# Patient Record
Sex: MALE | Race: WHITE | NOT HISPANIC OR LATINO
[De-identification: names, ages, dates, MRNs, and addresses within clinical notes are randomized per-mention and may not be internally consistent; named-entity substitution may affect disease eponyms.]

---

## 2019-08-08 ENCOUNTER — APPOINTMENT (OUTPATIENT)
Dept: CARDIOLOGY | Facility: CLINIC | Age: 84
End: 2019-08-08
Payer: MEDICARE

## 2019-08-08 PROCEDURE — 93000 ELECTROCARDIOGRAM COMPLETE: CPT

## 2019-08-08 PROCEDURE — 99213 OFFICE O/P EST LOW 20 MIN: CPT

## 2019-09-04 ENCOUNTER — OUTPATIENT (OUTPATIENT)
Dept: OUTPATIENT SERVICES | Facility: HOSPITAL | Age: 84
LOS: 1 days | Discharge: HOME | End: 2019-09-04

## 2019-09-04 VITALS — SYSTOLIC BLOOD PRESSURE: 158 MMHG | HEART RATE: 61 BPM | DIASTOLIC BLOOD PRESSURE: 68 MMHG | RESPIRATION RATE: 15 BRPM

## 2019-09-04 VITALS
HEIGHT: 69 IN | SYSTOLIC BLOOD PRESSURE: 157 MMHG | OXYGEN SATURATION: 97 % | WEIGHT: 220.02 LBS | TEMPERATURE: 97 F | HEART RATE: 74 BPM | RESPIRATION RATE: 18 BRPM | DIASTOLIC BLOOD PRESSURE: 90 MMHG

## 2019-09-04 DIAGNOSIS — Z95.1 PRESENCE OF AORTOCORONARY BYPASS GRAFT: Chronic | ICD-10-CM

## 2019-09-04 NOTE — PRE-ANESTHESIA EVALUATION ADULT - NSANTHOSAYNRD_GEN_A_CORE
No. MOSHE screening performed.  STOP BANG Legend: 0-2 = LOW Risk; 3-4 = INTERMEDIATE Risk; 5-8 = HIGH Risk

## 2019-09-04 NOTE — ASU PREOP CHECKLIST - TO WHOM
opportunity for questions and answers rendered ANGEL Hull RN opportunity for questions and answers rendered

## 2019-09-04 NOTE — ASU PREOP CHECKLIST - BP NONINVASIVE SYSTOLIC (MM HG)
Take antibiotic/medication as directed  For body aches/fever take over the counter antipyretic  Stay hydrated, push fluid  Steam bath may help with congestion  Humidifier  Warm salt gargle for sore throat  Follow up with primary care physician in about 4-5 days, if symptoms worsen or having shortness of breath go to ED        Patient Education     Sinusitis (Antibiotic Treatment)    The sinuses are air-filled spaces within the bones of the face. They connect to the inside of the nose. Sinusitis is an inflammation of the tissue that lines the sinuses. Sinusitis can occur during a cold. It can also happen due to allergies to pollens and other particles in the air. Sinusitis can cause symptoms of sinus congestion and a feeling of fullness. A sinus infection causes fever, headache, and facial pain. There is often green or yellow fluid draining from the nose or into the back of the throat (post-nasal drip). You have been given antibiotics to treat this condition.  Home care  · Take the full course of antibiotics as instructed. Do not stop taking them, even when you feel better.  · Drink plenty of water, hot tea, and other liquids. This may help thin nasal mucus. It also may help your sinuses drain fluids.  · Heat may help soothe painful areas of your face. Use a towel soaked in hot water. Or,  the shower and direct the warm spray onto your face. Using a vaporizer along with a menthol rub at night may also help soothe symptoms.   · An expectorant with guaifenesin may help thin nasal mucus and help your sinuses drain fluids.  · You can use an over-the-counter decongestant, unless a similar medicine was prescribed to you. Nasal sprays work the fastest. Use one that contains phenylephrine or oxymetazoline. First blow your nose gently. Then use the spray. Do not use these medicines more often than directed on the label. If you do, your symptoms may get worse. You may also take pills that contain pseudoephedrine. Don’t  use products that combine multiple medicines. This is because side effects may be increased. Read labels. You can also ask the pharmacist for help. (People with high blood pressure should not use decongestants. They can raise blood pressure.)  · Over-the-counter antihistamines may help if allergies contributed to your sinusitis.    · Do not use nasal rinses or irrigation during an acute sinus infection, unless your healthcare provider tells you to. Rinsing may spread the infection to other areas in your sinuses.  · Use acetaminophen or ibuprofen to control pain, unless another pain medicine was prescribed to you. If you have chronic liver or kidney disease or ever had a stomach ulcer, talk with your healthcare provider before using these medicines. (Aspirin should never be taken by anyone under age 18 who is ill with a fever. It may cause severe liver damage.)  · Don't smoke. This can make symptoms worse.  Follow-up care  Follow up with your healthcare provider or our staff if you are better in 1 week.  When to seek medical advice  Call your healthcare provider if any of these occur:  · Facial pain or headache that gets worse  · Stiff neck  · Unusual drowsiness or confusion  · Swelling of your forehead or eyelids  · Vision problems, such as blurred or double vision  · Fever of 100.4ºF (38ºC) or higher, or as directed by your healthcare provider  · Seizure  · Breathing problems  · Symptoms don't go away in 10 days  Prevention  Here are steps you can take to help prevent an infection:  · Keep good hand washing habits.  · Don’t have close contact with people who have sore throats, colds, or other upper respiratory infections.  · Don’t smoke, and stay away from secondhand smoke.  · Stay up to date with of your vaccines.  Date Last Reviewed: 11/1/2017  © 9477-0833 [employer]. [address] [place] [address], [place] [address]. All rights reserved. This information is not intended as a substitute for professional  medical care. Always follow your healthcare professional's instructions.           Patient Education     Seasonal Allergy  Seasonal allergy is also called hay fever. It may occur after a person is exposed to pollens released from grasses, weeds, trees and shrubs. This type of allergy occurs during the spring and summer when the pollen contacts the lining of the nose, eyes, eyelids, sinuses and throat. This causes histamine to be released from the tissues. Histamine causes itching and swelling. This may produce a watery discharge from the eyes or nose. Violent sneezing, nasal congestion, post-nasal drip, itching of the eyes, nose, throat and mouth, scratchy throat, and dry cough may also occur.  Home care  Seasonal allergy cannot be cured, but symptoms can be reduced by these measures:  · Stay away from or limit your time near the allergen as much as you can:    ? Stay indoors on windy days of pollen season.   ? Keep windows and doors closed. Use air conditioning instead in your home and car. This filters the air.  ? Change air conditioner filters often.  ? Take a shower, wash your hair, and change clothes after being outdoors.  ? Put on a NIOSH-rated 95 filter mask when working outdoors. Before going outside, take your allergy medicine as advised by your healthcare provider.  · Decongestant pills and sprays reduce tissue swelling and watery discharge. Overuse of nasal decongestant sprays may make symptoms worse. Do not use these more often than recommended. Sometimes you can experience a rebound effect (symptoms worsen), when stopping them. Talk to your healthcare provider or pharmacist about these medicines before taking them, especially if you have high blood pressure or heart problems.   · Antihistamines block the release of histamine during the allergic response. They work better when taken before symptoms develop. Unless a prescription antihistamine was prescribed, you can take over-the-counter antihistamines  that do not cause drowsiness.  Ask your pharmacist for suggestions.  · Steroid nasal sprays or oral steroids may also be prescribed for more severe symptoms. These help to reduce the local inflammation that can add to the allergic response.  · If you have asthma, pollen season may make your asthma symptoms worse. It is important that you use your asthma medicines as directed during this time to prevent or treat attacks. Some persons with asthma have asthma symptoms that get worse when they take antihistamines. This is due to the drying effect on the lungs. If you notice this, stop the antihistamines, drink extra fluids and notify your doctor.  · If you have sinus congestion or drainage, a saline nasal rinse may give relief. A saline nasal rinse lessens the swelling and clears excess mucus. This allows sinuses to drain. Prepackaged kits are sold at most drug stores. These contain pre-mixed salt packets and an irrigation device.  Follow-up care  Follow up with your healthcare provider, or as advised. If you have been referred to a specialist, make an appointment promptly.  When to seek medical advice  Call your healthcare provider right away for any of the following:  · Facial, ear or sinus pain; colored drainage from the nose  · Headaches  · You have asthma and your asthma symptoms do not respond to the usual doses of your medicine  · Cough with colored sputum (mucus)  · Fever of 100.4°F (38°C) or higher, or as directed by the healthcare provider  Call 911  Call 911 if any of these occur:  · Trouble breathing or swallowing, wheezing  · Hoarse voice, trouble speaking, or drooling  · Confusion  · Very drowsy or trouble awakening  · Fainting or loss of consciousness  · Rapid heart rate, or weak pulse  · Low blood pressure  · Feeling of doom  · Nausea, vomiting, abdominal pain, diarrhea  · Vomiting blood, or large amounts of blood in stool  · Seizure  · Cold, moist, or pale (blue in color) skin  Date Last Reviewed:  157 5/1/2017  © 5345-9709 [employer]. [address] [place] [address], [place] [address]. All rights reserved. This information is not intended as a substitute for professional medical care. Always follow your healthcare professional's instructions.

## 2019-09-04 NOTE — CHART NOTE - NSCHARTNOTEFT_GEN_A_CORE
PACU ANESTHESIA ADMISSION NOTE      Procedure:   Post op diagnosis:      ____  Intubated  TV:______       Rate: ______      FiO2: ______    ___x_  Patent Airway    __x__  Full return of protective reflexes    ___x_  Full recovery from anesthesia / back to baseline status    Vitals:  T(C): 35.9 (09-04-19 @ 12:56), Max: 35.9 (09-04-19 @ 11:19)  HR: 74 (09-04-19 @ 12:56) (74 - 74)  BP: 157/90 (09-04-19 @ 12:56) (157/90 - 157/90)  RR: 18 (09-04-19 @ 12:56) (18 - 18)  SpO2: 97% (09-04-19 @ 12:56) (97% - 97%)    Mental Status:  _x___ Awake   ___x__ Alert   _____ Drowsy   _____ Sedated    Nausea/Vomiting:  ____ NO  ___x___Yes,   See Post - Op Orders          Pain Scale (0-10):  _____    Treatment: ____ None    __x__ See Post - Op/PCA Orders    Post - Operative Fluids:   ____ Oral   ____x See Post - Op Orders    Plan: Discharge:   x____Home       _____Floor     _____Critical Care    _____  Other:_________________    Comments: uneventful anesthesia course no complications. VItals stable. Pt transferred to PACU

## 2019-09-13 DIAGNOSIS — H21.562 PUPILLARY ABNORMALITY, LEFT EYE: ICD-10-CM

## 2019-09-13 DIAGNOSIS — I25.10 ATHEROSCLEROTIC HEART DISEASE OF NATIVE CORONARY ARTERY WITHOUT ANGINA PECTORIS: ICD-10-CM

## 2019-09-13 DIAGNOSIS — H25.12 AGE-RELATED NUCLEAR CATARACT, LEFT EYE: ICD-10-CM

## 2019-10-24 ENCOUNTER — APPOINTMENT (OUTPATIENT)
Dept: CARDIOLOGY | Facility: CLINIC | Age: 84
End: 2019-10-24

## 2020-01-04 ENCOUNTER — INPATIENT (INPATIENT)
Facility: HOSPITAL | Age: 85
LOS: 3 days | Discharge: HOME | End: 2020-01-08
Attending: INTERNAL MEDICINE | Admitting: HOSPITALIST
Payer: MEDICARE

## 2020-01-04 VITALS
RESPIRATION RATE: 20 BRPM | SYSTOLIC BLOOD PRESSURE: 132 MMHG | HEART RATE: 115 BPM | TEMPERATURE: 100 F | DIASTOLIC BLOOD PRESSURE: 79 MMHG | OXYGEN SATURATION: 97 %

## 2020-01-04 DIAGNOSIS — Z95.1 PRESENCE OF AORTOCORONARY BYPASS GRAFT: Chronic | ICD-10-CM

## 2020-01-04 PROCEDURE — 99285 EMERGENCY DEPT VISIT HI MDM: CPT

## 2020-01-05 DIAGNOSIS — Z98.49 CATARACT EXTRACTION STATUS, UNSPECIFIED EYE: Chronic | ICD-10-CM

## 2020-01-05 PROBLEM — I25.10 ATHEROSCLEROTIC HEART DISEASE OF NATIVE CORONARY ARTERY WITHOUT ANGINA PECTORIS: Chronic | Status: ACTIVE | Noted: 2019-09-04

## 2020-01-05 LAB
ALBUMIN SERPL ELPH-MCNC: 3.9 G/DL — SIGNIFICANT CHANGE UP (ref 3.5–5.2)
ALP SERPL-CCNC: 88 U/L — SIGNIFICANT CHANGE UP (ref 30–115)
ALT FLD-CCNC: 11 U/L — SIGNIFICANT CHANGE UP (ref 0–41)
ANION GAP SERPL CALC-SCNC: 15 MMOL/L — HIGH (ref 7–14)
APPEARANCE UR: ABNORMAL
AST SERPL-CCNC: 13 U/L — SIGNIFICANT CHANGE UP (ref 0–41)
BACTERIA # UR AUTO: ABNORMAL
BILIRUB SERPL-MCNC: 0.8 MG/DL — SIGNIFICANT CHANGE UP (ref 0.2–1.2)
BILIRUB UR-MCNC: NEGATIVE — SIGNIFICANT CHANGE UP
BUN SERPL-MCNC: 12 MG/DL — SIGNIFICANT CHANGE UP (ref 10–20)
CALCIUM SERPL-MCNC: 8.9 MG/DL — SIGNIFICANT CHANGE UP (ref 8.5–10.1)
CHLORIDE SERPL-SCNC: 99 MMOL/L — SIGNIFICANT CHANGE UP (ref 98–110)
CO2 SERPL-SCNC: 25 MMOL/L — SIGNIFICANT CHANGE UP (ref 17–32)
COLOR SPEC: YELLOW — SIGNIFICANT CHANGE UP
CREAT SERPL-MCNC: 1.1 MG/DL — SIGNIFICANT CHANGE UP (ref 0.7–1.5)
DIFF PNL FLD: ABNORMAL
EPI CELLS # UR: 0 /HPF — SIGNIFICANT CHANGE UP (ref 0–5)
GAS PNL BLDA: SIGNIFICANT CHANGE UP
GLUCOSE BLDC GLUCOMTR-MCNC: 173 MG/DL — HIGH (ref 70–99)
GLUCOSE BLDC GLUCOMTR-MCNC: 237 MG/DL — HIGH (ref 70–99)
GLUCOSE BLDC GLUCOMTR-MCNC: 311 MG/DL — HIGH (ref 70–99)
GLUCOSE SERPL-MCNC: 186 MG/DL — HIGH (ref 70–99)
GLUCOSE UR QL: NEGATIVE — SIGNIFICANT CHANGE UP
HCT VFR BLD CALC: 41.1 % — LOW (ref 42–52)
HGB BLD-MCNC: 13.4 G/DL — LOW (ref 14–18)
HYALINE CASTS # UR AUTO: 5 /LPF — SIGNIFICANT CHANGE UP (ref 0–7)
KETONES UR-MCNC: NEGATIVE — SIGNIFICANT CHANGE UP
LACTATE SERPL-SCNC: 1.1 MMOL/L — SIGNIFICANT CHANGE UP (ref 0.7–2)
LEUKOCYTE ESTERASE UR-ACNC: ABNORMAL
MCHC RBC-ENTMCNC: 30.1 PG — SIGNIFICANT CHANGE UP (ref 27–31)
MCHC RBC-ENTMCNC: 32.6 G/DL — SIGNIFICANT CHANGE UP (ref 32–37)
MCV RBC AUTO: 92.4 FL — SIGNIFICANT CHANGE UP (ref 80–94)
NITRITE UR-MCNC: POSITIVE
NRBC # BLD: 0 /100 WBCS — SIGNIFICANT CHANGE UP (ref 0–0)
PH UR: 7 — SIGNIFICANT CHANGE UP (ref 5–8)
PLATELET # BLD AUTO: 129 K/UL — LOW (ref 130–400)
POTASSIUM SERPL-MCNC: 4.2 MMOL/L — SIGNIFICANT CHANGE UP (ref 3.5–5)
POTASSIUM SERPL-SCNC: 4.2 MMOL/L — SIGNIFICANT CHANGE UP (ref 3.5–5)
PROT SERPL-MCNC: 6.5 G/DL — SIGNIFICANT CHANGE UP (ref 6–8)
PROT UR-MCNC: ABNORMAL
RBC # BLD: 4.45 M/UL — LOW (ref 4.7–6.1)
RBC # FLD: 13 % — SIGNIFICANT CHANGE UP (ref 11.5–14.5)
RBC CASTS # UR COMP ASSIST: 10 /HPF — HIGH (ref 0–4)
SODIUM SERPL-SCNC: 139 MMOL/L — SIGNIFICANT CHANGE UP (ref 135–146)
SP GR SPEC: 1.02 — SIGNIFICANT CHANGE UP (ref 1.01–1.02)
TROPONIN T SERPL-MCNC: <0.01 NG/ML — SIGNIFICANT CHANGE UP
UROBILINOGEN FLD QL: SIGNIFICANT CHANGE UP
WBC # BLD: 7 K/UL — SIGNIFICANT CHANGE UP (ref 4.8–10.8)
WBC # FLD AUTO: 7 K/UL — SIGNIFICANT CHANGE UP (ref 4.8–10.8)
WBC UR QL: 84 /HPF — HIGH (ref 0–5)

## 2020-01-05 PROCEDURE — 99283 EMERGENCY DEPT VISIT LOW MDM: CPT

## 2020-01-05 PROCEDURE — 70450 CT HEAD/BRAIN W/O DYE: CPT | Mod: 26

## 2020-01-05 PROCEDURE — 93010 ELECTROCARDIOGRAM REPORT: CPT

## 2020-01-05 PROCEDURE — 71045 X-RAY EXAM CHEST 1 VIEW: CPT | Mod: 26,77

## 2020-01-05 PROCEDURE — 71045 X-RAY EXAM CHEST 1 VIEW: CPT | Mod: 26

## 2020-01-05 RX ORDER — FUROSEMIDE 40 MG
4 TABLET ORAL
Qty: 0 | Refills: 0 | DISCHARGE

## 2020-01-05 RX ORDER — SITAGLIPTIN 50 MG/1
1 TABLET, FILM COATED ORAL
Qty: 0 | Refills: 0 | DISCHARGE

## 2020-01-05 RX ORDER — SODIUM CHLORIDE 9 MG/ML
1000 INJECTION INTRAMUSCULAR; INTRAVENOUS; SUBCUTANEOUS ONCE
Refills: 0 | Status: COMPLETED | OUTPATIENT
Start: 2020-01-05 | End: 2020-01-05

## 2020-01-05 RX ORDER — GABAPENTIN 400 MG/1
300 CAPSULE ORAL THREE TIMES A DAY
Refills: 0 | Status: DISCONTINUED | OUTPATIENT
Start: 2020-01-05 | End: 2020-01-08

## 2020-01-05 RX ORDER — ENOXAPARIN SODIUM 100 MG/ML
40 INJECTION SUBCUTANEOUS DAILY
Refills: 0 | Status: DISCONTINUED | OUTPATIENT
Start: 2020-01-05 | End: 2020-01-08

## 2020-01-05 RX ORDER — ATORVASTATIN CALCIUM 80 MG/1
20 TABLET, FILM COATED ORAL AT BEDTIME
Refills: 0 | Status: DISCONTINUED | OUTPATIENT
Start: 2020-01-05 | End: 2020-01-08

## 2020-01-05 RX ORDER — INSULIN LISPRO 100/ML
6 VIAL (ML) SUBCUTANEOUS ONCE
Refills: 0 | Status: COMPLETED | OUTPATIENT
Start: 2020-01-05 | End: 2020-01-05

## 2020-01-05 RX ORDER — VANCOMYCIN HCL 1 G
1500 VIAL (EA) INTRAVENOUS ONCE
Refills: 0 | Status: COMPLETED | OUTPATIENT
Start: 2020-01-05 | End: 2020-01-05

## 2020-01-05 RX ORDER — MEROPENEM 1 G/30ML
1000 INJECTION INTRAVENOUS EVERY 8 HOURS
Refills: 0 | Status: DISCONTINUED | OUTPATIENT
Start: 2020-01-05 | End: 2020-01-08

## 2020-01-05 RX ORDER — CHLORHEXIDINE GLUCONATE 213 G/1000ML
1 SOLUTION TOPICAL
Refills: 0 | Status: DISCONTINUED | OUTPATIENT
Start: 2020-01-05 | End: 2020-01-08

## 2020-01-05 RX ORDER — VANCOMYCIN HCL 1 G
VIAL (EA) INTRAVENOUS
Refills: 0 | Status: DISCONTINUED | OUTPATIENT
Start: 2020-01-05 | End: 2020-01-06

## 2020-01-05 RX ORDER — SODIUM CHLORIDE 9 MG/ML
1000 INJECTION, SOLUTION INTRAVENOUS ONCE
Refills: 0 | Status: COMPLETED | OUTPATIENT
Start: 2020-01-05 | End: 2020-01-05

## 2020-01-05 RX ORDER — ASPIRIN/CALCIUM CARB/MAGNESIUM 324 MG
1 TABLET ORAL
Qty: 0 | Refills: 0 | DISCHARGE

## 2020-01-05 RX ORDER — ACETAMINOPHEN 500 MG
650 TABLET ORAL EVERY 6 HOURS
Refills: 0 | Status: DISCONTINUED | OUTPATIENT
Start: 2020-01-05 | End: 2020-01-05

## 2020-01-05 RX ORDER — ACETAMINOPHEN 500 MG
650 TABLET ORAL EVERY 6 HOURS
Refills: 0 | Status: DISCONTINUED | OUTPATIENT
Start: 2020-01-05 | End: 2020-01-08

## 2020-01-05 RX ORDER — TAMSULOSIN HYDROCHLORIDE 0.4 MG/1
0.4 CAPSULE ORAL AT BEDTIME
Refills: 0 | Status: DISCONTINUED | OUTPATIENT
Start: 2020-01-05 | End: 2020-01-08

## 2020-01-05 RX ORDER — FUROSEMIDE 40 MG
40 TABLET ORAL
Refills: 0 | Status: DISCONTINUED | OUTPATIENT
Start: 2020-01-05 | End: 2020-01-08

## 2020-01-05 RX ORDER — FINASTERIDE 5 MG/1
5 TABLET, FILM COATED ORAL DAILY
Refills: 0 | Status: DISCONTINUED | OUTPATIENT
Start: 2020-01-05 | End: 2020-01-08

## 2020-01-05 RX ORDER — ONDANSETRON 8 MG/1
4 TABLET, FILM COATED ORAL EVERY 4 HOURS
Refills: 0 | Status: DISCONTINUED | OUTPATIENT
Start: 2020-01-05 | End: 2020-01-08

## 2020-01-05 RX ORDER — ATORVASTATIN CALCIUM 80 MG/1
10 TABLET, FILM COATED ORAL AT BEDTIME
Refills: 0 | Status: DISCONTINUED | OUTPATIENT
Start: 2020-01-05 | End: 2020-01-05

## 2020-01-05 RX ORDER — ATORVASTATIN CALCIUM 80 MG/1
1 TABLET, FILM COATED ORAL
Qty: 0 | Refills: 0 | DISCHARGE

## 2020-01-05 RX ORDER — VANCOMYCIN HCL 1 G
1500 VIAL (EA) INTRAVENOUS EVERY 12 HOURS
Refills: 0 | Status: DISCONTINUED | OUTPATIENT
Start: 2020-01-06 | End: 2020-01-06

## 2020-01-05 RX ORDER — METOPROLOL TARTRATE 50 MG
0 TABLET ORAL
Qty: 0 | Refills: 0 | DISCHARGE

## 2020-01-05 RX ORDER — ASPIRIN/CALCIUM CARB/MAGNESIUM 324 MG
325 TABLET ORAL
Refills: 0 | Status: DISCONTINUED | OUTPATIENT
Start: 2020-01-05 | End: 2020-01-08

## 2020-01-05 RX ORDER — CEFTRIAXONE 500 MG/1
1000 INJECTION, POWDER, FOR SOLUTION INTRAMUSCULAR; INTRAVENOUS ONCE
Refills: 0 | Status: COMPLETED | OUTPATIENT
Start: 2020-01-05 | End: 2020-01-05

## 2020-01-05 RX ORDER — FOLIC ACID 0.8 MG
1 TABLET ORAL DAILY
Refills: 0 | Status: DISCONTINUED | OUTPATIENT
Start: 2020-01-05 | End: 2020-01-08

## 2020-01-05 RX ORDER — METOPROLOL TARTRATE 50 MG
25 TABLET ORAL DAILY
Refills: 0 | Status: DISCONTINUED | OUTPATIENT
Start: 2020-01-05 | End: 2020-01-05

## 2020-01-05 RX ADMIN — SODIUM CHLORIDE 1000 MILLILITER(S): 9 INJECTION INTRAMUSCULAR; INTRAVENOUS; SUBCUTANEOUS at 15:51

## 2020-01-05 RX ADMIN — SODIUM CHLORIDE 1000 MILLILITER(S): 9 INJECTION, SOLUTION INTRAVENOUS at 04:53

## 2020-01-05 RX ADMIN — ONDANSETRON 4 MILLIGRAM(S): 8 TABLET, FILM COATED ORAL at 10:00

## 2020-01-05 RX ADMIN — Medication 250 MILLIGRAM(S): at 17:08

## 2020-01-05 RX ADMIN — ENOXAPARIN SODIUM 40 MILLIGRAM(S): 100 INJECTION SUBCUTANEOUS at 17:08

## 2020-01-05 RX ADMIN — Medication 650 MILLIGRAM(S): at 15:52

## 2020-01-05 RX ADMIN — MEROPENEM 100 MILLIGRAM(S): 1 INJECTION INTRAVENOUS at 23:11

## 2020-01-05 RX ADMIN — Medication 6 UNIT(S): at 22:54

## 2020-01-05 RX ADMIN — TAMSULOSIN HYDROCHLORIDE 0.4 MILLIGRAM(S): 0.4 CAPSULE ORAL at 23:19

## 2020-01-05 RX ADMIN — Medication 650 MILLIGRAM(S): at 12:00

## 2020-01-05 RX ADMIN — ATORVASTATIN CALCIUM 20 MILLIGRAM(S): 80 TABLET, FILM COATED ORAL at 23:19

## 2020-01-05 RX ADMIN — CEFTRIAXONE 100 MILLIGRAM(S): 500 INJECTION, POWDER, FOR SOLUTION INTRAMUSCULAR; INTRAVENOUS at 04:53

## 2020-01-05 RX ADMIN — GABAPENTIN 300 MILLIGRAM(S): 400 CAPSULE ORAL at 23:19

## 2020-01-05 NOTE — ED PROVIDER NOTE - FAMILY DETAILS FREE TEXT FOR MDM ADDL HISTORY OBTAINED FROM QUESTION
History obtained primarily by family as per attending note. Patient not currently at baseline mental status - more confused than usual.

## 2020-01-05 NOTE — ED PROVIDER NOTE - PHYSICAL EXAMINATION
CONSTITUTIONAL: Well-developed; well-nourished; in no acute distress.   SKIN: warm, dry  HEAD: Normocephalic; atraumatic.  EYES: no conj injection; + left eyelid droop  ENT: No nasal discharge; airway clear.  NECK: Supple; non tender.  CARD: S1, S2 normal; no murmurs, gallops, or rubs. Regular rate and rhythm.   RESP: No wheezes, rales or rhonchi.  ABD: soft ntnd  EXT: Normal ROM.  No clubbing, cyanosis or edema.   NEURO: Alert, oriented, grossly unremarkable; 5/5 motor strength, CN II-VI, VIII-XII intact, neurovascularly intact, no pronator drift   PSYCH: Cooperative, appropriate.

## 2020-01-05 NOTE — H&P ADULT - ASSESSMENT
85 y/o M PMHx CAD s/p CABG, HTN, DM, DLD, left eye mole? presenting with difficulty ambulating and confusion found to have positive U/A.    # UTI  -unsteady gait with transient AMS, now with N/V and further confusion  -U/A positive nitrates, large LE and large Bacteria  -s/p Rocephin 1 gram in ED  -possible sudden rigors will r/o bacteremia although afebrile and no leukocytosis  -c/w Rocephin  -stat zofran and PRN for nausea    # Dizziness?  -on my interview pt described as just wobbly gait  -Neuro NP recs noted  -CTH w/o acute pathology  -will hold off on MRI until after seen by Neuro attending    # CAD s/p CABG/ HTN  -follows with Dr Wei  -no chest pain  -EKG with 1st AV block (no comparison available)    -c/w  q48, metoprolol, lipitor, lasix weekly    # DM  -home meds: Januvia 25mg  -hold oral meds  -monitor FS (180s 170s)  -will continue to hod meds for now start insulin regimen if FS consistently >180     # Left ey pathology?  -pt and family endorse that he had a left posterior eye mole? that was being watched for some time and now has enlarged. He has a follow up [facility] on Jan 13 for possible radiation?    # BPH  -c/w Flomax    # DVT ppx  -lovenox    # Diet  -DASH, diabetic    # Activity  -ambulate with assistance  -PT/Rehab eval    # Dispo  -anticipate d/c/ home when medially stable 87 y/o M PMHx CAD s/p CABG, HTN, DM, DLD, left eye mole? presenting with difficulty ambulating and confusion found to have positive U/A.    # UTI  -unsteady gait with transient AMS, now with N/V and further confusion  -U/A positive nitrates, large LE and large Bacteria  -s/p Rocephin 1 gram in ED  -sudden N/V rigors and temp 104.4 suspect possible transient bacteremia, Bcx sent  -c/w Rocephin  -stat zofran and PRN for nausea    # Dizziness?  -on my interview pt described as just wobbly gait  -Neuro NP recs noted  -CTH w/o acute pathology  -will hold off on MRI until after seen by Neuro attending    # CAD s/p CABG/ HTN  -follows with Dr Wei  -no chest pain  -EKG with 1st AV block (no comparison available)    -c/w  q48, metoprolol, lipitor, lasix weekly    # DM  -home meds: Januvia, metformin  -hold oral meds  -monitor FS (180s 170s)  -will continue to hod meds for now start insulin regimen if FS consistently >180     # Left eye pathology?  -pt and family endorse that he had a left posterior eye mole? that was being watched for some time and now has enlarged. He has a follow up [facility] on Jan 13 for possible radiation?    # BPH  -c/w Flomax  -finasteride in place of dutasteride    # DVT ppx  -lovenox    # Diet  -DASH, diabetic    # Activity  -ambulate with assistance  -PT/Rehab eval    # Dispo  -anticipate d/c/ home when medially stable 87 y/o M PMHx CAD s/p CABG, HTN, DM, DLD, left eye mole? presenting with difficulty ambulating and confusion found to have positive U/A.    # UTI now with sepsis  -unsteady gait with transient AMS, now with N/V further transient confusion and fevers  -U/A positive nitrates, large LE and large Bacteria  -s/p Rocephin 1 gram in ED  -sudden N/V rigors and temp 104.4 suspect possible bacteremia, Bcx sent  -c/w Rocephin  -stat zofran and PRN for nausea  -f/u Ucx and Bcx    # Dizziness?  -on my interview pt described as just wobbly gait not dizzy  -Neuro NP recs noted  -CTH w/o acute pathology  -suspect due to UTI/sepsis  -will hold off on MRI until after seen by Neuro attending    # CAD s/p CABG/ HTN  -follows with Dr Wei  -no chest pain  -EKG with 1st AV block (no comparison available)    -c/w  q48, metoprolol, lipitor, lasix weekly    # DM  -home meds: Januvia, metformin  -hold oral meds  -monitor FS (180s 170s)  -will continue to hold oral meds for now start insulin regimen if FS consistently >180     # Left eye pathology?  -pt and family endorse that he had a left posterior eye mole? that was being watched for some time and now has enlarged. He has a follow up [facility] on Jan 13 for possible radiation?    # BPH  -c/w Flomax  -finasteride in place of dutasteride    # DVT ppx  -lovenox    # Diet  -DASH, diabetic    # Activity  -ambulate with assistance  -PT/Rehab eval    # Dispo  -anticipate d/c/ home when medially stable

## 2020-01-05 NOTE — ED ADULT NURSE REASSESSMENT NOTE - NS ED NURSE REASSESS COMMENT FT1
pt assessed. son at bedside. asking for daily meds. resident marilou aware. doing admission now
pt reassessed. pt rectal temp 104.4 md aware. pt projectile vomiting bile fluid. pt sitting up in bed. family members at bedside. pt a/ox1-2. not at baseline. [id number] aware. family member reports pt abdomen looks more distended than usual. "will come see patient"
pt back at baseline. md at bedside. pt vitals stable. hr 89. pt a/ox3. unsure of year. pt ceased vomiting and shaking no s.s acute distress.

## 2020-01-05 NOTE — ED PROVIDER NOTE - ATTENDING CONTRIBUTION TO CARE
84 year old male, pmhx as documented above, presenting with dizziness episode that occurred yesterday afternoon. Patient is poor historian but per family at bedside, patient has not been acting his usual self over the past 2-3 days, acting more confused than normal. Yesterday he had an episode of dizziness which almost caused him to fall. The dizziness has since resolved but patient still not at baseline mental status per family. At this time, patient denies active complaints. No known fevers, chest pain, dyspnea, palpitations, headaches, vision changes, nausea, vomiting, abdominal pain, diarrhea, urinary symptoms, rash, recent travel or sick contacts.    Vital Signs: I have reviewed the initial vital signs.  Constitutional: NAD, well-nourished, appears stated age, no acute distress.  HEENT: Airway patent, moist MM, no erythema/swelling/deformity of oral structures. EOMI, PERRLA.  CV: regular rate, regular rhythm, well-perfused extremities, 2+ b/l DP and radial pulses equal.  Lungs: BCTA, no increased WOB.  ABD: NTND, no guarding or rebound, no pulsatile mass, no hernias.   MSK: Neck supple, nontender, nl ROM, no stepoff. Chest nontender. Back nontender in TLS spine or to b/l bony structures or flanks. Ext nontender, nl rom, no deformity.   INTEG: Skin warm, dry, no rash.  NEURO: A&Ox2 (person and place but not time), normal strength, nl sensation throughout, normal speech.   PSYCH: Calm, cooperative, normal affect and interaction.    Not at baseline mental status per family. Will obtain labs, UA, CT head, EKG/CXR, re-eval. Patient not dizzy at this time.

## 2020-01-05 NOTE — ED PROVIDER NOTE - NS ED ROS FT
Constitutional: See HPI.  Eyes: No visual changes, eye pain or discharge.  ENMT: No hearing changes, pain, discharge or infections. No neck pain or stiffness.  Cardiac: No chest pain, SOB or edema. No chest pain with exertion.  Respiratory: No cough or respiratory distress.   GI: No nausea, vomiting, diarrhea or abdominal pain.  : No dysuria, frequency or burning.  MS: No myalgia, muscle weakness, joint pain or back pain.  Neuro: +dizziness; No headache or weakness. No LOC.  Skin: No skin rash.  Endo: No hx of DM, thyroid disease  Except as documented in HPI, all other review of systems is negative

## 2020-01-05 NOTE — ED ADULT NURSE NOTE - GASTROINTESTINAL WDL
Not sure what this is.  Doesn't exist in the order database.  It may be the new classification of bacteria, but other than that, I am not familiar with this at all.  What does it entail?     Abdomen soft, nontender, nondistended, bowel sounds present in all 4 quadrants.

## 2020-01-05 NOTE — CONSULT NOTE ADULT - SUBJECTIVE AND OBJECTIVE BOX
Neurology Consult    Patient is a 84y old  Male who presents with a chief complaint of dizziness    HPI:  84 year old gentleman with a PMH of DM HTN DLD presents to the ED with acute onset dizziness. This sensation is described as dysequilibrium which developed at 1500 the previous day.     PAST MEDICAL & SURGICAL HISTORY:  3-vessel CAD  Abnormal cholesterol test  H/O: HTN (hypertension)  Borderline diabetes  S/P CABG x 3      FAMILY HISTORY:      Social History: (-) x 3    Allergies    No Known Allergies    Intolerances        MEDICATIONS  (STANDING):  cefTRIAXone   IVPB 1000 milliGRAM(s) IV Intermittent Once  lactated ringers Bolus 1000 milliLiter(s) IV Bolus once    MEDICATIONS  (PRN):    Vital Signs Last 24 Hrs  T(C): 37.7 (2020 23:18), Max: 37.7 (2020 23:18)  T(F): 99.8 (2020 23:18), Max: 99.8 (2020 23:18)  HR: 115 (2020 23:18) (115 - 115)  BP: 132/79 (2020 23:18) (132/79 - 132/79)  BP(mean): --  RR: 20 (2020 23:18) (20 - 20)  SpO2: 97% (2020 23:18) (97% - 97%)    Examination:  General:  Appearance is consistent with chronologic age.  No abnormal facies.  Gross skin survey within normal limits.    Cognitive/Language:  The patient is oriented to person, place, time and date.  Recent and remote memory intact.  Fund of knowledge is intact and normal.  Language with normal repetition, comprehension and naming.  Nondysarthric.    Eyes: intact VA, VFF.  EOMI w/o nystagmus, skew or reported double vision.  PERRL.  No ptosis/weakness of eyelid closure.    Face:  Facial sensation normal V1 - 3, no facial asymmetry.    Ears/Nose/Throat:  Hearing grossly intact b/l.  Palate elevates midline.  Tongue and uvula midline.   Motor examination:   Normal tone, bulk and range of motion.  No tenderness, twitching, tremors or involuntary movements.  Formal Muscle Strength Testing: (MRC grade R/L) 5/5 UE; 5/5 LE.  No observable drift.  Reflexes:   2+ b/l pectoralis, biceps, triceps, brachioradialis, patella and Achilles.  Plantar response downgoing b/l.  Jaw jerk, Ren, clonus absent.  Sensory examination:   Intact to light touch and pinprick, pain, temperature and proprioception and vibration in all extremities.  Cerebellum:   FTN/HKS intact with normal DINESH in all limbs.  No dysmetria or dysdiadokinesia.      NIHSS 0    Labs:   CBC Full  -  ( 2020 01:25 )  WBC Count : 7.00 K/uL  RBC Count : 4.45 M/uL  Hemoglobin : 13.4 g/dL  Hematocrit : 41.1 %  Platelet Count - Automated : 129 K/uL  Mean Cell Volume : 92.4 fL  Mean Cell Hemoglobin : 30.1 pg  Mean Cell Hemoglobin Concentration : 32.6 g/dL  Auto Neutrophil # : x  Auto Lymphocyte # : x  Auto Monocyte # : x  Auto Eosinophil # : x  Auto Basophil # : x  Auto Neutrophil % : x  Auto Lymphocyte % : x  Auto Monocyte % : x  Auto Eosinophil % : x  Auto Basophil % : x    -    139  |  99  |  12  ----------------------------<  186<H>  4.2   |  25  |  1.1    Ca    8.9      2020 01:25    TPro  6.5  /  Alb  3.9  /  TBili  0.8  /  DBili  x   /  AST  13  /  ALT  11  /  AlkPhos  88  01-05    LIVER FUNCTIONS - ( 2020 01:25 )  Alb: 3.9 g/dL / Pro: 6.5 g/dL / ALK PHOS: 88 U/L / ALT: 11 U/L / AST: 13 U/L / GGT: x             Urinalysis Basic - ( 2020 02:07 )    Color: Yellow / Appearance: Slightly Turbid / S.018 / pH: x  Gluc: x / Ketone: Negative  / Bili: Negative / Urobili: <2 mg/dL   Blood: x / Protein: 30 mg/dL / Nitrite: Positive   Leuk Esterase: Large / RBC: 10 /HPF / WBC 84 /HPF   Sq Epi: x / Non Sq Epi: 0 /HPF / Bacteria: Many          Neuroimaging:  NCHCT: CT Head No Cont:   EXAM:  CT BRAIN              Impression:       No CT evidence of acute intracranial pathology.    Stable chronic lacunar infarct within the anterior limb of the right internal capsule as well as chronic microvascular ischemic changes.

## 2020-01-05 NOTE — H&P ADULT - NSICDXPASTMEDICALHX_GEN_ALL_CORE_FT
PAST MEDICAL HISTORY:  3-vessel CAD     Abnormal cholesterol test     Borderline diabetes     H/O: HTN (hypertension)

## 2020-01-05 NOTE — ED ADULT NURSE NOTE - NSIMPLEMENTINTERV_GEN_ALL_ED
Implemented All Fall with Harm Risk Interventions:  [place] to call system. Call bell, personal items and telephone within reach. Instruct patient to call for assistance. Room bathroom lighting operational. Non-slip footwear when patient is off stretcher. Physically safe environment: no spills, clutter or unnecessary equipment. Stretcher in lowest position, wheels locked, appropriate side rails in place. Provide visual cue, wrist band, yellow gown, etc. Monitor gait and stability. Monitor for mental status changes and reorient to person, place, and time. Review medications for side effects contributing to fall risk. Reinforce activity limits and safety measures with patient and family. Provide visual clues: red socks.

## 2020-01-05 NOTE — ED PROVIDER NOTE - OBJECTIVE STATEMENT
83 y/o male with pmhx of CABGx3, DM, HTN presents with dizziness. Patient states around 2:30pm yesterday, he felt lightheaded as if he's about to pass out. Family states patient was behaving abnormally during that time, was not oriented to time. Family states patient is a little better now however is not back to baseline. Patient denies dizziness at this time. Denies fevers/chills, sob, chest pain, headache, vision changes, numbness/tingling, weakness, abdominal pain, n/v/d, dysuria, leg swelling, trauma.

## 2020-01-05 NOTE — ED PROVIDER NOTE - CLINICAL SUMMARY MEDICAL DECISION MAKING FREE TEXT BOX
Patient presented with episode of dizziness yesterday as well as altered mental status x 2-3 days. Otherwise on arrival, afebrile, HD stable, neurovascularly intact, but AAOx2 which is not his baseline. Obtained labs which were grossly unremarkable including no significant leukocytosis or anemia, no signs of dehydration/DWAIN or any electrolyte abnormalities. EKG non-ischemic without evidence of STEMI and troponin negative. Chest xray negative for pneumothorax, pneumonia, widened mediastinum, enlarged cardiac silhouette or any other emergent pathologies. CT head negative for hemorrhage, evidence of CVA, or any other emergent pathologies. UA however (+) for infection, which is likely cause of patient's metabolic encephalopathy vs TIA/CVA not shown on CT head. Treated with IV abx in ED, and consulted neurology for possible TIA/CVA who recommended MRI and will follow during admission. Family agreeable with plan. HD stable at time of admission.

## 2020-01-05 NOTE — ED PROVIDER NOTE - CARE PLAN
Principal Discharge DX:	Dizziness Principal Discharge DX:	Dizziness  Secondary Diagnosis:	UTI (urinary tract infection) Principal Discharge DX:	Dizziness  Secondary Diagnosis:	UTI (urinary tract infection)  Secondary Diagnosis:	Metabolic encephalopathy

## 2020-01-05 NOTE — CONSULT NOTE ADULT - ATTENDING COMMENTS
Patient examined on 1/5/20 and was feeling  better but would recommend CTA head and neck to rule out vertebrobasilar disease.

## 2020-01-05 NOTE — H&P ADULT - HISTORY OF PRESENT ILLNESS
87 y/o M PMHx CAD s/p CABG, HTN, DM, DLD, left eye mole? presenting with difficulty ambulating and confusion. Pt says he was on the couch yesterday afternoon when he got up to use the restroom he felt very wobbly. His wife notes that he was so unsteady that he almost fell and she had to assist him to the restroom. She also notes that he was a bit confused not answering normally, she notes that this progressed by the evening so she brought him to the ED.  Pt says he is feeling well, no longer feels confused, per wife mentation is back to his baseline. He notes that he had an episode of dysuria "burning" this morning in the ED but none previously at home, no increased frequency or other urinary symptoms. He denies headache, N/V/F/C, SOB, cough, ARAUZ, chest pain, abdominal pain, diarrhea, constipation, LE swelling, recent weight loss travel or sick contacts.    Pt given 1L LR and 1gram Rocephin in ED.    During my exam pt suddenly had episode of nausea followed by large yellow emesis, he denied further nausea but started having shivers. Pt was changed by ED staff, he then became uncomfortable in bed moving around, tachycardic and more confused. 87 y/o M PMHx CAD s/p CABG, HTN, DM, DLD, left eye mole? presenting with difficulty ambulating and confusion. Pt says he was on the couch yesterday afternoon when he got up to use the restroom he felt very wobbly. His wife notes that he was so unsteady that he almost fell and she had to assist him to the restroom. She also notes that he was a bit confused not answering normally, she notes that this progressed by the evening so she brought him to the ED.  Pt says he is feeling well, no longer feels confused, per wife mentation is back to his baseline. He notes that he had an episode of dysuria "burning" this morning in the ED but none previously at home, no increased frequency or other urinary symptoms. He denies headache, N/V/F/C, SOB, cough, ARAUZ, chest pain, abdominal pain, diarrhea, constipation, LE swelling, recent weight loss travel or sick contacts.    Pt given 1L LR and 1gram Rocephin in ED.    During my exam pt suddenly had episode of nausea followed by large yellow emesis, he denied further nausea but started having shivers. Pt was changed by ED staff, he then became uncomfortable in bed moving around, tachycardic and more confused, repeat tmp 104.4 Pt was given tylenol suppository and reglan for further nausea. 85 y/o M PMHx CAD s/p CABG, HTN, DM, DLD, left eye mole? presenting with difficulty ambulating and confusion. Pt says he was on the couch yesterday afternoon when he got up to use the restroom he felt very wobbly. His wife notes that he was so unsteady that he almost fell and she had to assist him to the restroom. She also notes that he was a bit confused not answering normally, she notes that this progressed by the evening so she brought him to the ED.  Pt says he is feeling well, no longer feels confused, per wife mentation is back to his baseline. He notes that he had an episode of dysuria "burning" this morning in the ED but none previously at home, no increased frequency or other urinary symptoms. He denies headache, N/V/F/C, SOB, cough, ARAUZ, chest pain, abdominal pain, diarrhea, constipation, LE swelling, recent weight loss travel or sick contacts.    Pt given 1L LR and 1gram Rocephin in ED.    During my exam pt suddenly had episode of nausea followed by large yellow emesis, he denied further nausea but started having shivers. Pt was changed by ED staff, he then became uncomfortable in bed moving around, tachycardic and more confused, repeat tmp 104.4 Pt was given tylenol suppository and reglan for further nausea.

## 2020-01-05 NOTE — CONSULT NOTE ADULT - ASSESSMENT
Impression:  84 year old gentleman with a PMH of DM HTN DLD presents to the ED with acute onset dizziness. This sensation is described as dysequilibrium which developed at 1500 the previous day. CTH revealed a chronic infarct. Etiology of symptoms unknown, will have a better understanding post stroke workup.    Plan:  MRI brain without pura.   MRA head and neck  Meclizine for dizziness.   PT OT Rehab.  Son has concerns for patients inability to walk.    Jp Hughes NP  [phone]

## 2020-01-05 NOTE — ED PROVIDER NOTE - NS ED MD TWO NIGHTS YN
89 year old female, only pmhx dementia, presenting s/p possible seizure vs syncope. Per family, who witnessed whole event, patient was eating and standing at which point she started staring into space and was not responsive to family calling her name. Her aide at bedside prevented her from falling and gradually sat her down in a chair at which point she states patient was making weird breathing/snoring noises. Upon EMS arrival patient was initially hypotensive to 80s, but on repeat has not had a BP under 120 systolic since arrival to ED. The whole episode lasted 5-10 minutes and then patient was arousable and at her baseline. No clear post-ictal period, no incontinence. Patient is currently at her baseline mental status per daughter at bedside. Yes

## 2020-01-05 NOTE — H&P ADULT - NSHPLABSRESULTS_GEN_ALL_CORE
Complete Blood Count (01.05.20 @ 01:25)    Nucleated RBC: 0 /100 WBCs    WBC Count: 7.00 K/uL    RBC Count: 4.45 M/uL    Hemoglobin: 13.4 g/dL    Hematocrit: 41.1 %    Mean Cell Volume: 92.4 fL    Mean Cell Hemoglobin: 30.1 pg    Mean Cell Hemoglobin Conc: 32.6 g/dL    Red Cell Distrib Width: 13.0 %    Platelet Count - Automated: 129 K/uL    Comprehensive Metabolic Panel (01.05.20 @ 01:25)    Sodium, Serum: 139 mmol/L    Potassium, Serum: 4.2 mmol/L    Chloride, Serum: 99 mmol/L    Carbon Dioxide, Serum: 25 mmol/L    Anion Gap, Serum: 15 mmol/L    Blood Urea Nitrogen, Serum: 12 mg/dL    Creatinine, Serum: 1.1 mg/dL    Glucose, Serum: 186 mg/dL    Calcium, Total Serum: 8.9 mg/dL    Protein Total, Serum: 6.5 g/dL    Albumin, Serum: 3.9 g/dL    Bilirubin Total, Serum: 0.8 mg/dL    Alkaline Phosphatase, Serum: 88 U/L    Aspartate Aminotransferase (AST/SGOT): 13 U/L    Alanine Aminotransferase (ALT/SGPT): 11 U/L    eGFR if Non African American: 62 mL/min/1.73M2    eGFR if African American: 72 mL/min/1.73M2    Troponin T, Serum (01.05.20 @ 01:25)    Troponin T, Serum: <0.01 ng/mL    Urinalysis (01.05.20 @ 02:07)    Glucose Qualitative, Urine: Negative    Blood, Urine: Small    pH Urine: 7.0    Color: Yellow    Urine Appearance: Slightly Turbid    Bilirubin: Negative    Ketone - Urine: Negative    Specific Gravity: 1.018    Protein, Urine: 30 mg/dL    Urobilinogen: <2 mg/dL    Nitrite: Positive    Leukocyte Esterase Concentration: Large  Urine Microscopic-Add On (NC) (01.05.20 @ 02:07)    Red Blood Cell - Urine: 10 /HPF    White Blood Cell - Urine: 84 /HPF    Hyaline Casts: 5 /LPF    Bacteria: Many    Epithelial Cells: 0 /HPF    < from: 12 Lead ECG (01.05.20 @ 02:30) >  Ventricular Rate 82 BPM  QTC Calculation(Bezet) 411 ms  Diagnosis Line Sinus rhythm with 1st degree A-V block  < end of copied text >    < from: CT Head No Cont (01.05.20 @ 03:29) >  Impression:   No CT evidence of acute intracranial pathology.    Stable chronic lacunar infarct within the anterior limb of the right internal capsule as well as chronic microvascular ischemic changes.  < end of copied text >

## 2020-01-05 NOTE — H&P ADULT - NSHPPHYSICALEXAM_GEN_ALL_CORE
Vitals:  T(F): 98.6 (01-05-20 @ 07:46), Max: 99.8 (01-04-20 @ 23:18)  HR: 76 (01-05-20 @ 07:46) (76 - 115)  BP: 131/59 (01-05-20 @ 07:46) (131/59 - 132/79)  RR: 20 (01-05-20 @ 07:46) (20 - 20)  SpO2: 98% (01-05-20 @ 07:46) (97% - 98%)    GENERAL: initially comfortable than became agitated and complaining of shivers after episode of emesis  HEENT: NCAT  CHEST/LUNG: CTAB  HEART: Regular rate and rhythm then became tachycardic; s1 s2 appreciated, No murmurs, rubs, or gallops  ABDOMEN: Soft, Nontender, Nondistended; Bowel sounds present, NO CVA tenderness or suprapubic distention/tenderness  EXTREMITIES: No LE edema b/l  NERVOUS SYSTEM:  Alert & Oriented X3 initially then became confused

## 2020-01-06 LAB
ANION GAP SERPL CALC-SCNC: 12 MMOL/L — SIGNIFICANT CHANGE UP (ref 7–14)
BASOPHILS # BLD AUTO: 0.01 K/UL — SIGNIFICANT CHANGE UP (ref 0–0.2)
BASOPHILS NFR BLD AUTO: 0.2 % — SIGNIFICANT CHANGE UP (ref 0–1)
BUN SERPL-MCNC: 18 MG/DL — SIGNIFICANT CHANGE UP (ref 10–20)
CALCIUM SERPL-MCNC: 7.9 MG/DL — LOW (ref 8.5–10.1)
CHLORIDE SERPL-SCNC: 105 MMOL/L — SIGNIFICANT CHANGE UP (ref 98–110)
CO2 SERPL-SCNC: 27 MMOL/L — SIGNIFICANT CHANGE UP (ref 17–32)
CREAT SERPL-MCNC: 1.2 MG/DL — SIGNIFICANT CHANGE UP (ref 0.7–1.5)
EOSINOPHIL # BLD AUTO: 0.09 K/UL — SIGNIFICANT CHANGE UP (ref 0–0.7)
EOSINOPHIL NFR BLD AUTO: 2.1 % — SIGNIFICANT CHANGE UP (ref 0–8)
GLUCOSE BLDC GLUCOMTR-MCNC: 115 MG/DL — HIGH (ref 70–99)
GLUCOSE BLDC GLUCOMTR-MCNC: 125 MG/DL — HIGH (ref 70–99)
GLUCOSE BLDC GLUCOMTR-MCNC: 145 MG/DL — HIGH (ref 70–99)
GLUCOSE BLDC GLUCOMTR-MCNC: 208 MG/DL — HIGH (ref 70–99)
GLUCOSE SERPL-MCNC: 162 MG/DL — HIGH (ref 70–99)
HCT VFR BLD CALC: 36 % — LOW (ref 42–52)
HGB BLD-MCNC: 11.7 G/DL — LOW (ref 14–18)
IMM GRANULOCYTES NFR BLD AUTO: 0.5 % — HIGH (ref 0.1–0.3)
LYMPHOCYTES # BLD AUTO: 0.98 K/UL — LOW (ref 1.2–3.4)
LYMPHOCYTES # BLD AUTO: 23 % — SIGNIFICANT CHANGE UP (ref 20.5–51.1)
MCHC RBC-ENTMCNC: 30.5 PG — SIGNIFICANT CHANGE UP (ref 27–31)
MCHC RBC-ENTMCNC: 32.5 G/DL — SIGNIFICANT CHANGE UP (ref 32–37)
MCV RBC AUTO: 93.8 FL — SIGNIFICANT CHANGE UP (ref 80–94)
MONOCYTES # BLD AUTO: 0.67 K/UL — HIGH (ref 0.1–0.6)
MONOCYTES NFR BLD AUTO: 15.7 % — HIGH (ref 1.7–9.3)
NEUTROPHILS # BLD AUTO: 2.49 K/UL — SIGNIFICANT CHANGE UP (ref 1.4–6.5)
NEUTROPHILS NFR BLD AUTO: 58.5 % — SIGNIFICANT CHANGE UP (ref 42.2–75.2)
NRBC # BLD: 0 /100 WBCS — SIGNIFICANT CHANGE UP (ref 0–0)
PLATELET # BLD AUTO: 79 K/UL — LOW (ref 130–400)
POTASSIUM SERPL-MCNC: 4.1 MMOL/L — SIGNIFICANT CHANGE UP (ref 3.5–5)
POTASSIUM SERPL-SCNC: 4.1 MMOL/L — SIGNIFICANT CHANGE UP (ref 3.5–5)
RBC # BLD: 3.84 M/UL — LOW (ref 4.7–6.1)
RBC # FLD: 13.6 % — SIGNIFICANT CHANGE UP (ref 11.5–14.5)
SODIUM SERPL-SCNC: 144 MMOL/L — SIGNIFICANT CHANGE UP (ref 135–146)
WBC # BLD: 4.26 K/UL — LOW (ref 4.8–10.8)
WBC # FLD AUTO: 4.26 K/UL — LOW (ref 4.8–10.8)

## 2020-01-06 PROCEDURE — 70496 CT ANGIOGRAPHY HEAD: CPT | Mod: 26

## 2020-01-06 PROCEDURE — 71275 CT ANGIOGRAPHY CHEST: CPT | Mod: 26

## 2020-01-06 PROCEDURE — 70498 CT ANGIOGRAPHY NECK: CPT | Mod: 26

## 2020-01-06 RX ORDER — POLYETHYLENE GLYCOL 3350 17 G/17G
17 POWDER, FOR SOLUTION ORAL DAILY
Refills: 0 | Status: DISCONTINUED | OUTPATIENT
Start: 2020-01-06 | End: 2020-01-08

## 2020-01-06 RX ORDER — VANCOMYCIN HCL 1 G
1250 VIAL (EA) INTRAVENOUS EVERY 12 HOURS
Refills: 0 | Status: DISCONTINUED | OUTPATIENT
Start: 2020-01-06 | End: 2020-01-07

## 2020-01-06 RX ADMIN — GABAPENTIN 300 MILLIGRAM(S): 400 CAPSULE ORAL at 22:30

## 2020-01-06 RX ADMIN — MEROPENEM 100 MILLIGRAM(S): 1 INJECTION INTRAVENOUS at 22:29

## 2020-01-06 RX ADMIN — CHLORHEXIDINE GLUCONATE 1 APPLICATION(S): 213 SOLUTION TOPICAL at 05:24

## 2020-01-06 RX ADMIN — POLYETHYLENE GLYCOL 3350 17 GRAM(S): 17 POWDER, FOR SOLUTION ORAL at 11:53

## 2020-01-06 RX ADMIN — Medication 1 MILLIGRAM(S): at 11:52

## 2020-01-06 RX ADMIN — GABAPENTIN 300 MILLIGRAM(S): 400 CAPSULE ORAL at 14:14

## 2020-01-06 RX ADMIN — MEROPENEM 100 MILLIGRAM(S): 1 INJECTION INTRAVENOUS at 05:24

## 2020-01-06 RX ADMIN — MEROPENEM 100 MILLIGRAM(S): 1 INJECTION INTRAVENOUS at 14:13

## 2020-01-06 RX ADMIN — Medication 166.67 MILLIGRAM(S): at 17:08

## 2020-01-06 RX ADMIN — TAMSULOSIN HYDROCHLORIDE 0.4 MILLIGRAM(S): 0.4 CAPSULE ORAL at 22:32

## 2020-01-06 RX ADMIN — GABAPENTIN 300 MILLIGRAM(S): 400 CAPSULE ORAL at 05:24

## 2020-01-06 RX ADMIN — ENOXAPARIN SODIUM 40 MILLIGRAM(S): 100 INJECTION SUBCUTANEOUS at 11:52

## 2020-01-06 RX ADMIN — ATORVASTATIN CALCIUM 20 MILLIGRAM(S): 80 TABLET, FILM COATED ORAL at 22:30

## 2020-01-06 RX ADMIN — FINASTERIDE 5 MILLIGRAM(S): 5 TABLET, FILM COATED ORAL at 11:52

## 2020-01-06 NOTE — CONSULT NOTE ADULT - ASSESSMENT

## 2020-01-06 NOTE — CONSULT NOTE ADULT - SUBJECTIVE AND OBJECTIVE BOX
HPI:  83 y/o M PMHx CAD s/p CABG, HTN, DM, DLD, left eye mole? presenting with difficulty ambulating and confusion. Pt says he was on the couch yesterday afternoon when he got up to use the restroom he felt very wobbly. His wife notes that he was so unsteady that he almost fell and she had to assist him to the restroom. She also notes that he was a bit confused not answering normally, she notes that this progressed by the evening so she brought him to the ED.  Pt says he is feeling well, no longer feels confused, per wife mentation is back to his baseline. He notes that he had an episode of dysuria "burning" this morning in the ED but none previously at home, no increased frequency or other urinary symptoms. He denies headache, N/V/F/C, SOB, cough, ARAUZ, chest pain, abdominal pain, diarrhea, constipation, LE swelling, recent weight loss travel or sick contacts.    Pt given 1L LR and 1gram Rocephin in ED.    During my exam pt suddenly had episode of nausea followed by large yellow emesis, he denied further nausea but started having shivers. Pt was changed by ED staff, he then became uncomfortable in bed moving around, tachycardic and more confused, repeat tmp 104.4 Pt was given tylenol suppository and reglan for further nausea. (2020 12:03)      PAST MEDICAL & SURGICAL HISTORY:  3-vessel CAD  Abnormal cholesterol test  H/O: HTN (hypertension)  Borderline diabetes  History of cataract surgery: left eye, Sep 2019  S/P CABG x 3      Hospital Course:    TODAY'S SUBJECTIVE & REVIEW OF SYMPTOMS:     Constitutional WNL   Cardio WNL   Resp WNL   GI WNL  Heme WNL  Endo WNL  Skin WNL  MSK Weakness  Neuro WNL  Cognitive WNL  Psych WNL      MEDICATIONS  (STANDING):  aspirin 325 milliGRAM(s) Oral <User Schedule>  atorvastatin 20 milliGRAM(s) Oral at bedtime  chlorhexidine 4% Liquid 1 Application(s) Topical <User Schedule>  enoxaparin Injectable 40 milliGRAM(s) SubCutaneous daily  finasteride 5 milliGRAM(s) Oral daily  folic acid 1 milliGRAM(s) Oral daily  furosemide    Tablet 40 milliGRAM(s) Oral <User Schedule>  gabapentin 300 milliGRAM(s) Oral three times a day  meropenem  IVPB 1000 milliGRAM(s) IV Intermittent every 8 hours  polyethylene glycol 3350 17 Gram(s) Oral daily  tamsulosin 0.4 milliGRAM(s) Oral at bedtime  vancomycin  IVPB 1250 milliGRAM(s) IV Intermittent every 12 hours    MEDICATIONS  (PRN):  acetaminophen   Tablet .. 650 milliGRAM(s) Oral every 6 hours PRN Temp greater or equal to 38C (100.4F)  ondansetron Injectable 4 milliGRAM(s) IV Push every 4 hours PRN Nausea and/or Vomiting      FAMILY HISTORY:      Allergies    No Known Allergies    Intolerances        SOCIAL HISTORY:    [  ] Etoh  [  ] Smoking  [  ] Substance abuse     Home Environment:  [  ] Home Alone  [ x ] Lives with Family  [  ] Home Health Aid    Dwelling:  [  ] Apartment  [x  ] Private House  [  ] Adult Home  [  ] Skilled Nursing Facility      [  ] Short Term  [  ] Long Term  [ x ] Stairs       Elevator [  ]    FUNCTIONAL STATUS PTA: (Check all that apply)  Ambulation: [ x  ]Independent    [  ] Dependent     [  ] Non-Ambulatory  Assistive Device: [  ] SA Cane  [  ]  Q Cane  [  ] Walker  [  ]  Wheelchair  ADL : [ x ] Independent  [  ]  Dependent       Vital Signs Last 24 Hrs  T(C): 37.3 (2020 21:30), Max: 39.1 (2020 14:30)  T(F): 99.2 (2020 21:30), Max: 102.4 (2020 14:30)  HR: 56 (2020 21:30) (56 - 78)  BP: 93/50 (2020 21:30) (89/51 - 105/60)  BP(mean): --  RR: 18 (2020 21:30) (18 - 20)  SpO2: 95% (2020 07:45) (92% - 96%)      PHYSICAL EXAM: Alert & Oriented X3  GENERAL: NAD, well-groomed, well-developed  HEAD:  Atraumatic, Normocephalic  CHEST/LUNG: Clear   HEART: S1S2+  ABDOMEN: Soft, Nontender  EXTREMITIES:  no calf tenderness    NERVOUS SYSTEM:  Cranial Nerves 2-12 intact [  ] Abnormal  [  ]  ROM: WFL all extremities [ x ]  Abnormal [  ]  Motor Strength: WFL all extremities  [  ]  Abnormal [ x ]4/5 all ext  Sensation: intact to light touch [x  ] Abnormal [  ]  Reflexes: Symmetric [  ]  Abnormal [  ]    FUNCTIONAL STATUS:  Bed Mobility: Independent [  ]  Supervision [  ]  Needs Assistance [ x ]  N/A [  ]  Transfers: Independent [  ]  Supervision [  ]  Needs Assistance [x  ]  N/A [  ]   Ambulation: Independent [  ]  Supervision [  ]  Needs Assistance [  ]  N/A [  ]  ADL: Independent [  ] Requires Assistance [  ] N/A [  ]      LABS:                        11.7   4.26  )-----------( 79       ( 2020 06:25 )             36.0     01-06    144  |  105  |  18  ----------------------------<  162<H>  4.1   |  27  |  1.2    Ca    7.9<L>      2020 06:25    TPro  6.5  /  Alb  3.9  /  TBili  0.8  /  DBili  x   /  AST  13  /  ALT  11  /  AlkPhos  88  01-05      Urinalysis Basic - ( 2020 02:07 )    Color: Yellow / Appearance: Slightly Turbid / S.018 / pH: x  Gluc: x / Ketone: Negative  / Bili: Negative / Urobili: <2 mg/dL   Blood: x / Protein: 30 mg/dL / Nitrite: Positive   Leuk Esterase: Large / RBC: 10 /HPF / WBC 84 /HPF   Sq Epi: x / Non Sq Epi: 0 /HPF / Bacteria: Many        RADIOLOGY & ADDITIONAL STUDIES:    Assesment:

## 2020-01-06 NOTE — PROGRESS NOTE ADULT - SUBJECTIVE AND OBJECTIVE BOX
Patient was seen and examined. Spoke with RN. Chart reviewed.  sitting oob/comf  wife at bedside  extensive discussion  d/w jakub branham ct head/neck    No events overnight.  Vital Signs Last 24 Hrs  T(F): 99.2 (2020 21:30), Max: 102.4 (2020 14:30)  HR: 56 (2020 21:30) (56 - 78)  BP: 93/50 (2020 21:30) (89/51 - 105/60)  SpO2: 95% (2020 07:45) (92% - 96%)  MEDICATIONS  (STANDING):  aspirin 325 milliGRAM(s) Oral <User Schedule>  atorvastatin 20 milliGRAM(s) Oral at bedtime  chlorhexidine 4% Liquid 1 Application(s) Topical <User Schedule>  enoxaparin Injectable 40 milliGRAM(s) SubCutaneous daily  finasteride 5 milliGRAM(s) Oral daily  folic acid 1 milliGRAM(s) Oral daily  furosemide    Tablet 40 milliGRAM(s) Oral <User Schedule>  gabapentin 300 milliGRAM(s) Oral three times a day  meropenem  IVPB 1000 milliGRAM(s) IV Intermittent every 8 hours  polyethylene glycol 3350 17 Gram(s) Oral daily  tamsulosin 0.4 milliGRAM(s) Oral at bedtime  vancomycin  IVPB 1250 milliGRAM(s) IV Intermittent every 12 hours    MEDICATIONS  (PRN):  acetaminophen   Tablet .. 650 milliGRAM(s) Oral every 6 hours PRN Temp greater or equal to 38C (100.4F)  ondansetron Injectable 4 milliGRAM(s) IV Push every 4 hours PRN Nausea and/or Vomiting    Labs:                        11.7   4.26  )-----------( 79       ( 2020 06:25 )             36.0                         13.4   7.00  )-----------( 129      ( 2020 01:25 )             41.1     2020 06:25    144    |  105    |  18     ----------------------------<  162    4.1     |  27     |  1.2    2020 01:25    139    |  99     |  12     ----------------------------<  186    4.2     |  25     |  1.1      Ca    7.9        2020 06:25  Ca    8.9        2020 01:25    TPro  6.5    /  Alb  3.9    /  TBili  0.8    /  DBili  x      /  AST  13     /  ALT  11     /  AlkPhos  88     2020 01:25      Urinalysis Basic - ( 2020 02:07 )    Color: Yellow / Appearance: Slightly Turbid / S.018 / pH: x  Gluc: x / Ketone: Negative  / Bili: Negative / Urobili: <2 mg/dL   Blood: x / Protein: 30 mg/dL / Nitrite: Positive   Leuk Esterase: Large / RBC: 10 /HPF / WBC 84 /HPF   Sq Epi: x / Non Sq Epi: 0 /HPF / Bacteria: Many          Radiology:    General: comfortable, NAD  Neurology: A&Ox3, nonfocal  Head:  Normocephalic, atraumatic  ENT:  Mucosa moist, no ulcerations  Neck:  Supple, no JVD,   Skin: no breakdowns (as per RN)  Resp: CTA B/L  CV: RRR, S1S2,   GI: Soft, NT, bowel sounds  MS: No edema, + peripheral pulses, FROM all 4 extremity

## 2020-01-06 NOTE — PHYSICAL THERAPY INITIAL EVALUATION ADULT - PLANNED THERAPY INTERVENTIONS, PT EVAL
bed mobility training/balance training/stair negotiation/transfer training/gait training/ROM/strengthening

## 2020-01-06 NOTE — PHYSICAL THERAPY INITIAL EVALUATION ADULT - GENERAL OBSERVATIONS, REHAB EVAL
Pt seen [phone] for a total of 50 minutes. Pt encountered semifowlers in bed, no apparent distress, agreeable to physical therapy, +bed alarm.

## 2020-01-06 NOTE — PHYSICAL THERAPY INITIAL EVALUATION ADULT - GAIT DISTANCE, PT EVAL
x4. Pt refused to leave room at this time, stating he was uncomfortable doing so. Pt educated he will have to leave the room tomorrow to perform steps. Pt in good understanding and agreement./10 feet

## 2020-01-06 NOTE — PROGRESS NOTE ADULT - ASSESSMENT
85 y/o M PMHx CAD s/p CABG, HTN, DM, DLD, left eye mole? presenting with difficulty ambulating and confusion found to have positive U/A.    # UTI now with sepsis  -Unsteady gait with transient AMS, now with N/V further transient confusion and fevers  -U/A positive nitrates, large LE and large Bacteria  -S/p Rocephin 1 gram in ED  -Sudden N/V rigors and temp 104.4 suspect possible bacteremia, Bcx sent  -Rocephin switched to Vancomycin and Meropenem  -Stat zofran and PRN for nausea  -F/u Ucx and Bcx  -Trend platelets    # Dizziness  -Neuro NP recs noted  -CTH w/o acute pathology  -Suspect due to UTI/sepsis  -F/u MRI brain without gadolinium  -F/u MRA head and neck    # CAD s/p CABG/ HTN  -Follows with Dr Wei  -No chest pain  -EKG with 1st AV block (no comparison available)    -C/w  q48, metoprolol, lipitor, lasix weekly    # DM  -Home meds: Januvia, metformin  -Hold oral meds  -Monitor FS (180s 170s)  -Will continue to hold oral meds for now start insulin regimen if FS consistently >180     # Left eye pathology  -Pt and family endorse that he had a left posterior eye mole? that was being watched for some time and now has enlarged. He has a follow up [facility] on Jan 13 for possible radiation?    # BPH  -C/w Flomax  -Finasteride in place of dutasteride    # DVT ppx  -Lovenox    # Diet  -DASH, diabetic    # Activity  -Ambulates with assistance  -PT/Rehab eval    # Dispo  -Anticipate d/c home when medially stable 85 y/o M PMHx CAD s/p CABG, HTN, DM, DLD, left eye mole? presenting with difficulty ambulating and confusion found to have positive U/A.    # UTI now with sepsis  -septic on admission, T104.4 WBC elevated   -U/A positive   -S/p Rocephin 1 gram in ED  -c/w Vancomycin and Meropenem  -F/u Ucx and Bcx    #Acute Thrombcytopenia   - Plat    # Dizziness  -Neuro NP recs noted  -CTH w/o acute pathology  -Suspect due to UTI/sepsis  -F/u MRI brain without gadolinium  -F/u MRA head and neck    # CAD s/p CABG/ HTN  -Follows with Dr Wei  -No chest pain  -EKG with 1st AV block (no comparison available)    -C/w  q48, metoprolol, lipitor, lasix weekly    # DM  -Home meds: Januvia, metformin  -Hold oral meds  -Monitor FS (180s 170s)  -Will continue to hold oral meds for now start insulin regimen if FS consistently >180     # Left eye pathology  -Pt and family endorse that he had a left posterior eye mole? that was being watched for some time and now has enlarged. He has a follow up [facility] on Jan 13 for possible radiation?    # BPH  -C/w Flomax  -Finasteride in place of dutasteride    # DVT ppx  -Lovenox    # Diet  -DASH, diabetic    # Activity  -Ambulates with assistance  -PT/Rehab eval    # Dispo  -Anticipate d/c home when medially stable 85 y/o M PMHx CAD s/p CABG, HTN, DM, DLD, left eye mole? presenting with difficulty ambulating and confusion found to have positive U/A.    # UTI with sepsis and AMS   -septic on admission, T104.4  WBC wnl   -U/A positive   -Head CT neg   -S/p Rocephin 1 gram in ED  -c/w Vancomycin and Meropenem  -F/u Ucx and Bcx  -Mental status at baseline in AM as per family     #Acute Thrombocytopenia   - Platelets dropped from 129 to 79   - Likely secondary to sepsis   - Will monitor for now     # Dizziness  -Neuro recs noted  -CTH w/o acute pathology  -Suspect due to UTI/sepsis  -f/u CT Perfusion    # CAD s/p CABG/ HTN  -Follows with Dr Wei  -No chest pain  -EKG with 1st AV block (no comparison available)    -C/w  q48, metoprolol, lipitor, lasix weekly    # DM  -Home meds: Januvia, metformin  -Hold oral meds  -Monitor FS (180s 170s)  -Will continue to hold oral meds for now start insulin regimen if FS consistently >180     # Left eye pathology  -Pt and family endorse that he had a left posterior eye mole? that was being watched for some time and now has enlarged. He has a follow up [facility] on Jan 13 for possible radiation?    # BPH  -C/w Flomax  -Finasteride in place of dutasteride    # DVT ppx  -Lovenox    # Diet  -DASH, diabetic    # Activity  -Ambulates with assistance  -PT/Rehab eval    # Dispo  -Anticipate d/c home when medially stable 85 y/o M PMHx CAD s/p CABG, HTN, DM, DLD, left eye mole? presenting with difficulty ambulating and confusion found to have positive U/A.    # UTI with sepsis and AMS   -septic on admission, T104.4  WBC wnl   -U/A positive   -Head CT neg   -S/p Rocephin 1 gram in ED  -c/w Vancomycin and Meropenem  -F/u Ucx and Bcx  -Mental status at baseline in AM as per family     #Acute Thrombocytopenia   - Platelets dropped from 129 to 79   - Likely secondary to sepsis   - Will monitor for now     # Dizziness  -Neuro recs noted  -CTH w/o acute pathology  -Suspect due to UTI/sepsis  -f/u CT Perfusion    # CAD s/p CABG/ HTN  -Follows with Dr Wei  -No chest pain  -EKG with 1st AV block (no comparison available)    -C/w  q48, metoprolol, lipitor, lasix weekly    # DM  -Home meds: Januvia, metformin  -Hold oral meds  -Monitor FS (180s 170s)  -Will continue to hold oral meds for now start insulin regimen if FS consistently >180     # Left eye pathology  -Pt and family endorse that he had a left posterior eye mole? that was being watched for some time and now has enlarged. He has a follow up [facility] on Jan 13 for possible radiation?    # BPH  -C/w Flomax  -Finasteride in place of dutasteride    # DVT ppx  -Lovenox    # Diet  -DASH, diabetic    # Activity  -Ambulates with assistance  -PT/Rehab eval    # Dispo  -Anticipate d/c home when medially stable    I examined the patient with Medical Student. Plan was discussed. Note was reviewed and edited where appropriate by me. Agree with plan and management as above.

## 2020-01-06 NOTE — PROGRESS NOTE ADULT - SUBJECTIVE AND OBJECTIVE BOX
Patient Information:  CHAPINCITO YBARRA / 84y / Male / MRN#:[id number]    Hospital Day: 1d    HPI:  83 y/o M PMHx CAD s/p CABG, HTN, DM, DLD, left eye mole? presenting with difficulty ambulating and confusion. Pt says he was on the couch yesterday afternoon when he got up to use the restroom he felt very wobbly. His wife notes that he was so unsteady that he almost fell and she had to assist him to the restroom. She also notes that he was a bit confused not answering normally, she notes that this progressed by the evening so she brought him to the ED.  Pt says he is feeling well, no longer feels confused, per wife mentation is back to his baseline. He notes that he had an episode of dysuria "burning" this morning in the ED but none previously at home, no increased frequency or other urinary symptoms. He denies headache, N/V/F/C, SOB, cough, ARAUZ, chest pain, abdominal pain, diarrhea, constipation, LE swelling, recent weight loss travel or sick contacts.  Pt given 1L LR and 1gram Rocephin in ED.  Pt suddenly had episode of nausea followed by large yellow emesis, he denied further nausea but started having shivers. Pt was changed by ED staff, he then became uncomfortable in bed moving around, tachycardic and more confused, repeat tmp 104.4 Pt was given tylenol suppository and reglan for further nausea.    Interval History:  Patient seen and examined at bedside. Patient had an episode of hypothermia and dizziness over night. Coverage broadened to vancomycin and meropenem.    Past Medical History:  3-vessel CAD  Abnormal cholesterol test  H/O: HTN (hypertension)  Borderline diabetes    Past Surgical History:  History of cataract surgery  S/P CABG x 3    Allergies:  No Known Allergies    Medications:  PRN:  acetaminophen   Tablet .. 650 milliGRAM(s) Oral every 6 hours PRN Temp greater or equal to 38C (100.4F)  ondansetron Injectable 4 milliGRAM(s) IV Push every 4 hours PRN Nausea and/or Vomiting    Standing:  aspirin 325 milliGRAM(s) Oral <User Schedule>  atorvastatin 20 milliGRAM(s) Oral at bedtime  chlorhexidine 4% Liquid 1 Application(s) Topical <User Schedule>  enoxaparin Injectable 40 milliGRAM(s) SubCutaneous daily  finasteride 5 milliGRAM(s) Oral daily  folic acid 1 milliGRAM(s) Oral daily  furosemide    Tablet 40 milliGRAM(s) Oral <User Schedule>  gabapentin 300 milliGRAM(s) Oral three times a day  meropenem  IVPB 1000 milliGRAM(s) IV Intermittent every 8 hours  polyethylene glycol 3350 17 Gram(s) Oral daily  tamsulosin 0.4 milliGRAM(s) Oral at bedtime  vancomycin  IVPB 1250 milliGRAM(s) IV Intermittent every 12 hours    Home:  aspirin 325 mg oral tablet: 1 tab(s) orally every 48 hours  atorvastatin 20 mg oral tablet: 1 tab(s) orally once a day  Colace 100 mg oral capsule: 1 cap(s) orally 3 times a day  dutasteride 0.5 mg oral capsule: 1 cap(s) orally once a day  folic acid 1 mg oral tablet: 1 tab(s) orally once a day  gabapentin 300 mg oral tablet: 1 tab(s) orally 3 times a day  Januvia 100 mg oral tablet: 1 tab(s) orally once a day  Lasix 40 mg oral tablet: 1 tab(s) orally once a week on Sunday  metFORMIN 500 mg oral tablet: 1 tab(s) orally 2 times a day  metoprolol succinate 25 mg oral tablet, extended release: 1 tab(s) orally once a day  tamsulosin 0.4 mg oral capsule: 1 cap(s) orally once a day    Vitals:  T(C): 37.3, Max: 39.1 (01-05-20 @ 14:30)  T(F): 99.2, Max: 102.4 (01-05-20 @ 14:30)  HR: 56 (56 - 78)  BP: 93/50 (89/51 - 105/60)  RR: 18 (18 - 20)  SpO2: 95% (92% - 96%)    Physical Exam:  General: Awake, Alert. Not in acute distress.  Heart: Regular rate and rhythm; S1, S2; No murmurs.  Lungs: Clear to auscultation bilaterally.  Abdomen: Soft, nontender, nondistended.  Extremities: No edema in upper or lower extremities.  Neuro: AAOx3, No focal deficits.    Labs:  CBC (01-06 @ 06:25)                        Hgb: 11.7   WBC: 4.26  )-----------------( Plts: 79                               Hct: 36.0     Chem (01-06 @ 06:25)  Na: 144  |     Cl: 105     |  BUN: 18  -----------------------------------------< Gluc: 162    K: 4.1   |    HCO3: 27  |  Cr: 1.2    Ca 7.9 (01-06 @ 06:25)    LFTs (01-05 @ 01:25)  TPro 6.5  /  Alb 3.9  TBili 0.8  /  DBili     AST 13  /  ALT 11  /  AlkPhos 88      ABG (01-05 @ 16:18)  pH, Arterial: 7.34 pH, Blood: X /  pCO2: 50 /  pO2: 75 / HCO3: 27 / Base Excess: 0.5 /  SaO2: 95       Microbiology:  Urinalysis (01.05.20 @ 02:07)    pH Urine: 7.0    Glucose Qualitative, Urine: Negative    Blood, Urine: Small    Color: Yellow    Urine Appearance: Slightly Turbid    Bilirubin: Negative    Ketone - Urine: Negative    Specific Gravity: 1.018    Protein, Urine: 30 mg/dL    Urobilinogen: <2 mg/dL    Nitrite: Positive    Leukocyte Esterase Concentration: Large    Urine Microscopic-Add On (NC) (01.05.20 @ 02:07)    Red Blood Cell - Urine: 10 /HPF    White Blood Cell - Urine: 84 /HPF    Hyaline Casts: 5 /LPF    Bacteria: Many    Epithelial Cells: 0 /HPF      Radiology:  < from: CT Head No Cont (01.05.20 @ 03:29) >  Impression:       No CT evidence of acute intracranial pathology.    Stable chronic lacunar infarct within the anterior limb of the right internal capsule as well as chronic microvascular ischemic changes.    < end of copied text >    < from: Xray Chest 1 View-PORTABLE IMMEDIATE (01.05.20 @ 07:46) >  Impression:      No radiographic evidence of acute pulmonary disease.    No significant change since prior exam    < end of copied text >    < from: Xray Chest 1 View-PORTABLE IMMEDIATE (01.05.20 @ 19:05) >  Impression:      Low lung volumes.    Unchanged.    < end of copied text >    < from: CT Angio Chest w/ IV Cont (01.06.20 @ 01:13) >  IMPRESSION:     No central or lobar pulmonary embolism.    Trace bilateral pleural effusions with adjacent compressive atelectasis.    Reflux of contrast into the intrahepatic IVC and hepatic veins, suggestive of right heart failure.    Partially included sclerosis and well-corticated lucency involving the L1 vertebra. Nonemergent MRI may be of benefit.    < end of copied text >

## 2020-01-06 NOTE — PROGRESS NOTE ADULT - ASSESSMENT
metabolic encephalopathy sec to uti, improved  dizziness  acute thrombocytopenia  cad    ct head/neck  iv abx   fu cx  id fu  continue current tx  rehab/pt dc planning

## 2020-01-06 NOTE — PHYSICAL THERAPY INITIAL EVALUATION ADULT - CRITERIA FOR SKILLED THERAPEUTIC INTERVENTIONS
risk reduction/prevention/therapy frequency/predicted duration of therapy intervention/impairments found/functional limitations in following categories/rehab potential

## 2020-01-07 LAB
-  AMIKACIN: SIGNIFICANT CHANGE UP
-  AMPICILLIN/SULBACTAM: SIGNIFICANT CHANGE UP
-  AMPICILLIN: SIGNIFICANT CHANGE UP
-  AZTREONAM: SIGNIFICANT CHANGE UP
-  CEFAZOLIN: SIGNIFICANT CHANGE UP
-  CEFEPIME: SIGNIFICANT CHANGE UP
-  CEFOXITIN: SIGNIFICANT CHANGE UP
-  CEFTRIAXONE: SIGNIFICANT CHANGE UP
-  CIPROFLOXACIN: SIGNIFICANT CHANGE UP
-  GENTAMICIN: SIGNIFICANT CHANGE UP
-  IMIPENEM: SIGNIFICANT CHANGE UP
-  LEVOFLOXACIN: SIGNIFICANT CHANGE UP
-  MEROPENEM: SIGNIFICANT CHANGE UP
-  NITROFURANTOIN: SIGNIFICANT CHANGE UP
-  PIPERACILLIN/TAZOBACTAM: SIGNIFICANT CHANGE UP
-  TIGECYCLINE: SIGNIFICANT CHANGE UP
-  TOBRAMYCIN: SIGNIFICANT CHANGE UP
-  TRIMETHOPRIM/SULFAMETHOXAZOLE: SIGNIFICANT CHANGE UP
ALBUMIN SERPL ELPH-MCNC: 3.2 G/DL — LOW (ref 3.5–5.2)
ALP SERPL-CCNC: 68 U/L — SIGNIFICANT CHANGE UP (ref 30–115)
ALT FLD-CCNC: 17 U/L — SIGNIFICANT CHANGE UP (ref 0–41)
ANION GAP SERPL CALC-SCNC: 12 MMOL/L — SIGNIFICANT CHANGE UP (ref 7–14)
AST SERPL-CCNC: 22 U/L — SIGNIFICANT CHANGE UP (ref 0–41)
BASOPHILS # BLD AUTO: 0.02 K/UL — SIGNIFICANT CHANGE UP (ref 0–0.2)
BASOPHILS NFR BLD AUTO: 0.6 % — SIGNIFICANT CHANGE UP (ref 0–1)
BILIRUB SERPL-MCNC: 0.5 MG/DL — SIGNIFICANT CHANGE UP (ref 0.2–1.2)
BUN SERPL-MCNC: 15 MG/DL — SIGNIFICANT CHANGE UP (ref 10–20)
CALCIUM SERPL-MCNC: 8 MG/DL — LOW (ref 8.5–10.1)
CHLORIDE SERPL-SCNC: 107 MMOL/L — SIGNIFICANT CHANGE UP (ref 98–110)
CO2 SERPL-SCNC: 24 MMOL/L — SIGNIFICANT CHANGE UP (ref 17–32)
CREAT SERPL-MCNC: 1 MG/DL — SIGNIFICANT CHANGE UP (ref 0.7–1.5)
CULTURE RESULTS: SIGNIFICANT CHANGE UP
EOSINOPHIL # BLD AUTO: 0.08 K/UL — SIGNIFICANT CHANGE UP (ref 0–0.7)
EOSINOPHIL NFR BLD AUTO: 2.4 % — SIGNIFICANT CHANGE UP (ref 0–8)
GLUCOSE BLDC GLUCOMTR-MCNC: 162 MG/DL — HIGH (ref 70–99)
GLUCOSE BLDC GLUCOMTR-MCNC: 169 MG/DL — HIGH (ref 70–99)
GLUCOSE BLDC GLUCOMTR-MCNC: 169 MG/DL — HIGH (ref 70–99)
GLUCOSE BLDC GLUCOMTR-MCNC: 203 MG/DL — HIGH (ref 70–99)
GLUCOSE SERPL-MCNC: 155 MG/DL — HIGH (ref 70–99)
HCT VFR BLD CALC: 35.8 % — LOW (ref 42–52)
HGB BLD-MCNC: 11.7 G/DL — LOW (ref 14–18)
IMM GRANULOCYTES NFR BLD AUTO: 0.3 % — SIGNIFICANT CHANGE UP (ref 0.1–0.3)
LYMPHOCYTES # BLD AUTO: 0.97 K/UL — LOW (ref 1.2–3.4)
LYMPHOCYTES # BLD AUTO: 28.7 % — SIGNIFICANT CHANGE UP (ref 20.5–51.1)
MCHC RBC-ENTMCNC: 30.5 PG — SIGNIFICANT CHANGE UP (ref 27–31)
MCHC RBC-ENTMCNC: 32.7 G/DL — SIGNIFICANT CHANGE UP (ref 32–37)
MCV RBC AUTO: 93.2 FL — SIGNIFICANT CHANGE UP (ref 80–94)
METHOD TYPE: SIGNIFICANT CHANGE UP
MONOCYTES # BLD AUTO: 0.64 K/UL — HIGH (ref 0.1–0.6)
MONOCYTES NFR BLD AUTO: 18.9 % — HIGH (ref 1.7–9.3)
NEUTROPHILS # BLD AUTO: 1.66 K/UL — SIGNIFICANT CHANGE UP (ref 1.4–6.5)
NEUTROPHILS NFR BLD AUTO: 49.1 % — SIGNIFICANT CHANGE UP (ref 42.2–75.2)
NRBC # BLD: 0 /100 WBCS — SIGNIFICANT CHANGE UP (ref 0–0)
ORGANISM # SPEC MICROSCOPIC CNT: SIGNIFICANT CHANGE UP
ORGANISM # SPEC MICROSCOPIC CNT: SIGNIFICANT CHANGE UP
PLATELET # BLD AUTO: 110 K/UL — LOW (ref 130–400)
POTASSIUM SERPL-MCNC: 3.9 MMOL/L — SIGNIFICANT CHANGE UP (ref 3.5–5)
POTASSIUM SERPL-SCNC: 3.9 MMOL/L — SIGNIFICANT CHANGE UP (ref 3.5–5)
PROT SERPL-MCNC: 5.3 G/DL — LOW (ref 6–8)
RBC # BLD: 3.84 M/UL — LOW (ref 4.7–6.1)
RBC # FLD: 13.3 % — SIGNIFICANT CHANGE UP (ref 11.5–14.5)
SODIUM SERPL-SCNC: 143 MMOL/L — SIGNIFICANT CHANGE UP (ref 135–146)
SPECIMEN SOURCE: SIGNIFICANT CHANGE UP
WBC # BLD: 3.38 K/UL — LOW (ref 4.8–10.8)
WBC # FLD AUTO: 3.38 K/UL — LOW (ref 4.8–10.8)

## 2020-01-07 RX ADMIN — TAMSULOSIN HYDROCHLORIDE 0.4 MILLIGRAM(S): 0.4 CAPSULE ORAL at 21:50

## 2020-01-07 RX ADMIN — GABAPENTIN 300 MILLIGRAM(S): 400 CAPSULE ORAL at 05:09

## 2020-01-07 RX ADMIN — GABAPENTIN 300 MILLIGRAM(S): 400 CAPSULE ORAL at 14:01

## 2020-01-07 RX ADMIN — Medication 1 MILLIGRAM(S): at 11:36

## 2020-01-07 RX ADMIN — CHLORHEXIDINE GLUCONATE 1 APPLICATION(S): 213 SOLUTION TOPICAL at 05:10

## 2020-01-07 RX ADMIN — FINASTERIDE 5 MILLIGRAM(S): 5 TABLET, FILM COATED ORAL at 11:36

## 2020-01-07 RX ADMIN — ATORVASTATIN CALCIUM 20 MILLIGRAM(S): 80 TABLET, FILM COATED ORAL at 21:49

## 2020-01-07 RX ADMIN — ENOXAPARIN SODIUM 40 MILLIGRAM(S): 100 INJECTION SUBCUTANEOUS at 11:37

## 2020-01-07 RX ADMIN — MEROPENEM 100 MILLIGRAM(S): 1 INJECTION INTRAVENOUS at 14:01

## 2020-01-07 RX ADMIN — Medication 325 MILLIGRAM(S): at 11:37

## 2020-01-07 RX ADMIN — MEROPENEM 100 MILLIGRAM(S): 1 INJECTION INTRAVENOUS at 21:49

## 2020-01-07 RX ADMIN — MEROPENEM 100 MILLIGRAM(S): 1 INJECTION INTRAVENOUS at 05:09

## 2020-01-07 RX ADMIN — GABAPENTIN 300 MILLIGRAM(S): 400 CAPSULE ORAL at 21:49

## 2020-01-07 RX ADMIN — Medication 166.67 MILLIGRAM(S): at 05:09

## 2020-01-07 NOTE — PROGRESS NOTE ADULT - SUBJECTIVE AND OBJECTIVE BOX
Patient Information:  CHAPINCITO YBARRA / 84y / Male / MRN#:[id number]    Hospital Day: 2d    HPI:  85 y/o M PMHx CAD s/p CABG, HTN, DM, DLD, left eye mole? presenting with difficulty ambulating and confusion. Pt says he was on the couch yesterday afternoon when he got up to use the restroom he felt very wobbly. His wife notes that he was so unsteady that he almost fell and she had to assist him to the restroom. She also notes that he was a bit confused not answering normally, she notes that this progressed by the evening so she brought him to the ED.  Pt says he is feeling well, no longer feels confused, per wife mentation is back to his baseline. He notes that he had an episode of dysuria "burning" this morning in the ED but none previously at home, no increased frequency or other urinary symptoms. He denies headache, N/V/F/C, SOB, cough, ARAUZ, chest pain, abdominal pain, diarrhea, constipation, LE swelling, recent weight loss travel or sick contacts.  Pt given 1L LR and 1gram Rocephin in ED.  Patient suddenly had episode of nausea followed by large yellow emesis, he denied further nausea but started having shivers. Pt was changed by ED staff, he then became uncomfortable in bed moving around, tachycardic and more confused, repeat tmp 104.4   Pt was given tylenol suppository and reglan for further nausea.    Interval History:  Patient seen and examined at bedside. No acute events overnight. Patient states he is feeling much better and the dizziness has resolved.     Past Medical History:  3-vessel CAD  Abnormal cholesterol test  H/O: HTN (hypertension)  Borderline diabetes    Past Surgical History:  History of cataract surgery  S/P CABG x 3    Allergies:  No Known Allergies    Medications:  PRN:  acetaminophen   Tablet .. 650 milliGRAM(s) Oral every 6 hours PRN Temp greater or equal to 38C (100.4F)  ondansetron Injectable 4 milliGRAM(s) IV Push every 4 hours PRN Nausea and/or Vomiting    Standing:  aspirin 325 milliGRAM(s) Oral <User Schedule>  atorvastatin 20 milliGRAM(s) Oral at bedtime  chlorhexidine 4% Liquid 1 Application(s) Topical <User Schedule>  enoxaparin Injectable 40 milliGRAM(s) SubCutaneous daily  finasteride 5 milliGRAM(s) Oral daily  folic acid 1 milliGRAM(s) Oral daily  furosemide    Tablet 40 milliGRAM(s) Oral <User Schedule>  gabapentin 300 milliGRAM(s) Oral three times a day  meropenem  IVPB 1000 milliGRAM(s) IV Intermittent every 8 hours  polyethylene glycol 3350 17 Gram(s) Oral daily  tamsulosin 0.4 milliGRAM(s) Oral at bedtime    Home:  aspirin 325 mg oral tablet: 1 tab(s) orally every 48 hours  atorvastatin 20 mg oral tablet: 1 tab(s) orally once a day  Colace 100 mg oral capsule: 1 cap(s) orally 3 times a day  dutasteride 0.5 mg oral capsule: 1 cap(s) orally once a day  folic acid 1 mg oral tablet: 1 tab(s) orally once a day  gabapentin 300 mg oral tablet: 1 tab(s) orally 3 times a day  Januvia 100 mg oral tablet: 1 tab(s) orally once a day  Lasix 40 mg oral tablet: 1 tab(s) orally once a week on Sunday  metFORMIN 500 mg oral tablet: 1 tab(s) orally 2 times a day  metoprolol succinate 25 mg oral tablet, extended release: 1 tab(s) orally once a day  tamsulosin 0.4 mg oral capsule: 1 cap(s) orally once a day    Vitals:  T(C): 35.6, Max: 35.8 (01-06-20 @ 20:00)  T(F): 96, Max: 96.4 (01-06-20 @ 20:00)  HR: 79 (79 - 87)  BP: 119/50 (107/56 - 119/50)  RR: 15 (15 - 18)  SpO2: 96% (96% - 96%)    Physical Exam:  General: Awake, Alert. Not in acute distress.  Heart: Regular rate and rhythm; S1, S2; No murmurs.  Lungs: Clear to auscultation bilaterally.  Abdomen: Soft, nontender, nondistended.  Extremities: No edema in upper or lower extremities.  Neuro: AAOx3, No focal deficits.    Labs:  CBC (01-07 @ 05:42)                        Hgb: 11.7   WBC: 3.38  )-----------------( Plts: 110                              Hct: 35.8     Chem (01-07 @ 05:42)  Na: 143  |     Cl: 107     |  BUN: 15  -----------------------------------------< Gluc: 155  Urinalysis (01.05.20 @ 02:07)    Glucose Qualitative, Urine: Negative    Blood, Urine: Small    pH Urine: 7.0    Color: Yellow    Urine Appearance: Slightly Turbid    Bilirubin: Negative    Ketone - Urine: Negative    Specific Gravity: 1.018    Protein, Urine: 30 mg/dL    Urobilinogen: <2 mg/dL    Nitrite: Positive    Leukocyte Esterase Concentration: Large      K: 3.9   |    HCO3: 24  |  Cr: 1.0    Ca 8.0 (01-07 @ 05:42)    LFTs (01-07 @ 05:42)  TPro 5.3  /  Alb 3.2  TBili 0.5  /  DBili     AST 22  /  ALT 17  /  AlkPhos 68      ABG (01-05 @ 16:18)  pH, Arterial: 7.34 pH, Blood: X /  pCO2: 50 /  pO2: 75 / HCO3: 27 / Base Excess: 0.5 /  SaO2: 95       Microbiology:  Urine Microscopic-Add On (NC) (01.05.20 @ 02:07)    Epithelial Cells: 0 /HPF    Red Blood Cell - Urine: 10 /HPF    Bacteria: Many    White Blood Cell - Urine: 84 /HPF    Hyaline Casts: 5 /LPF    Urinalysis (01.05.20 @ 02:07)    Glucose Qualitative, Urine: Negative    Blood, Urine: Small    pH Urine: 7.0    Color: Yellow    Urine Appearance: Slightly Turbid    Bilirubin: Negative    Ketone - Urine: Negative    Specific Gravity: 1.018    Protein, Urine: 30 mg/dL    Urobilinogen: <2 mg/dL    Nitrite: Positive    Leukocyte Esterase Concentration: Large    Culture - Urine (collected 01-05 @ 10:20)  Source: .Urine Clean Catch (Midstream)  Preliminary Report (01-06 @ 18:13):    >100,000 CFU/ml Gram Negative Rods    Culture - Blood (collected 01-05 @ 10:20)  Source: .Blood Blood  Preliminary Report (01-06 @ 19:01):    No growth to date.    Radiology:  < from: CT Head No Cont (01.05.20 @ 03:29) >  Impression:       No CT evidence of acute intracranial pathology.    Stable chronic lacunar infarct within the anterior limb of the right internal capsule as well as chronic microvascular ischemic changes.    < end of copied text >    < from: Xray Chest 1 View-PORTABLE IMMEDIATE (01.05.20 @ 07:46) >  Impression:      No radiographic evidence of acute pulmonary disease.    No significant change since prior exam    < end of copied text >    < from: Xray Chest 1 View-PORTABLE IMMEDIATE (01.05.20 @ 19:05) >  Impression:      Low lung volumes.    Unchanged.    < end of copied text >    < from: CT Angio Chest w/ IV Cont (01.06.20 @ 01:13) >  IMPRESSION:     No central or lobar pulmonary embolism.    Trace bilateral pleural effusions with adjacent compressive atelectasis.    Reflux of contrast into the intrahepatic IVC and hepatic veins, suggestive of right heart failure.    Partially included sclerosis and well-corticated lucency involving the L1 vertebra. Nonemergent MRI may be of benefit.    < end of copied text >    < from: CT Angio Head w/ IV Cont (01.06.20 @ 13:23) >  IMPRESSION:  CTA neck: No significant stenosis. No dissection.    CTA brain: No significant stenosis or aneurysm.    < end of copied text >    < from: CT Angio Neck w/ IV Cont (01.06.20 @ 13:24) >  IMPRESSION:  CTA neck: No significant stenosis. No dissection.    CTA brain: No significant stenosis or aneurysm.    < end of copied text >

## 2020-01-07 NOTE — PROGRESS NOTE ADULT - ASSESSMENT
ct head/neck  iv abx   fu cx  id fu  continue current tx  rehab/pt dc planning  po abx once sensitivities back

## 2020-01-07 NOTE — PROGRESS NOTE ADULT - ASSESSMENT
87 y/o M PMHx CAD s/p CABG, HTN, DM, DLD, left eye mole? presenting with difficulty ambulating and confusion found to have positive U/A.    # UTI with sepsis and AMS   -septic on admission, T104.4  WBC wnl   -U/A positive   -Head CT neg   -S/p Rocephin 1 gram in ED  -BCx: NGTD  -UCx: Gram-negative rods  -c/w Meropenem  -Mental status at baseline in AM as per family     #Acute Thrombocytopenia   - Platelets dropped from 129 to 79   - Likely secondary to sepsis   - Continue to monitor  - Platelets today at 110    # Dizziness  -Neuro recs noted  -CTH w/o acute pathology  -Suspect due to UTI/sepsis  -CT perfusion negative    # CAD s/p CABG/ HTN  -Follows with Dr Wei  -No chest pain  -EKG with 1st AV block (no comparison available)    -C/w  q48, metoprolol, lipitor, lasix weekly    # DM  -Home meds: Januvia, metformin  -Hold oral meds  -Monitor FS (180s 170s)  -Will continue to hold oral meds for now start insulin regimen if FS consistently >180     # Left eye pathology  -Pt and family endorse that he had a left posterior eye mole? that was being watched for some time and now has enlarged. He has a follow up [facility] on Jan 13 for possible radiation?    # BPH  -C/w Flomax  -Finasteride in place of dutasteride    # DVT ppx  -Lovenox    # Diet  -DASH, diabetic    # Activity  -Ambulates with assistance  -PT/Rehab eval    # Dispo  -Anticipate d/c home when medially stable    I examined the patient with Medical Student. Plan was discussed. Note was reviewed and edited where appropriate by me. Agree with plan and management as above.

## 2020-01-07 NOTE — PROGRESS NOTE ADULT - SUBJECTIVE AND OBJECTIVE BOX
Patient was seen and examined. Spoke with RN. Chart reviewed.  ambulating well  wife at bedside  lengthy discussion  d/w carrol cordero sensitivities of gm neg rods in ux  dc once back    No events overnight.  Vital Signs Last 24 Hrs  T(F): 96 (07 Jan 2020 12:15), Max: 96.4 (06 Jan 2020 20:00)  HR: 79 (07 Jan 2020 12:15) (79 - 87)  BP: 119/50 (07 Jan 2020 12:15) (107/56 - 119/50)  SpO2: 96% (06 Jan 2020 20:05) (96% - 96%)  MEDICATIONS  (STANDING):  aspirin 325 milliGRAM(s) Oral <User Schedule>  atorvastatin 20 milliGRAM(s) Oral at bedtime  chlorhexidine 4% Liquid 1 Application(s) Topical <User Schedule>  enoxaparin Injectable 40 milliGRAM(s) SubCutaneous daily  finasteride 5 milliGRAM(s) Oral daily  folic acid 1 milliGRAM(s) Oral daily  furosemide    Tablet 40 milliGRAM(s) Oral <User Schedule>  gabapentin 300 milliGRAM(s) Oral three times a day  meropenem  IVPB 1000 milliGRAM(s) IV Intermittent every 8 hours  polyethylene glycol 3350 17 Gram(s) Oral daily  tamsulosin 0.4 milliGRAM(s) Oral at bedtime    MEDICATIONS  (PRN):  acetaminophen   Tablet .. 650 milliGRAM(s) Oral every 6 hours PRN Temp greater or equal to 38C (100.4F)  ondansetron Injectable 4 milliGRAM(s) IV Push every 4 hours PRN Nausea and/or Vomiting    Labs:                        11.7   3.38  )-----------( 110      ( 07 Jan 2020 05:42 )             35.8                         11.7   4.26  )-----------( 79       ( 06 Jan 2020 06:25 )             36.0     07 Jan 2020 05:42    143    |  107    |  15     ----------------------------<  155    3.9     |  24     |  1.0    06 Jan 2020 06:25    144    |  105    |  18     ----------------------------<  162    4.1     |  27     |  1.2      Ca    8.0        07 Jan 2020 05:42  Ca    7.9        06 Jan 2020 06:25    TPro  5.3    /  Alb  3.2    /  TBili  0.5    /  DBili  x      /  AST  22     /  ALT  17     /  AlkPhos  68     07 Jan 2020 05:42          Culture - Urine (collected 05 Jan 2020 10:20)  Source: .Urine Clean Catch (Midstream)  Preliminary Report (06 Jan 2020 18:13):    >100,000 CFU/ml Gram Negative Rods    Culture - Blood (collected 05 Jan 2020 10:20)  Source: .Blood Blood  Preliminary Report (06 Jan 2020 19:01):    No growth to date.        Radiology:    General: comfortable, NAD  Neurology: A&Ox3, nonfocal  Head:  Normocephalic, atraumatic  ENT:  Mucosa moist, no ulcerations  Neck:  Supple, no JVD,   Skin: no breakdowns (as per RN)  Resp: CTA B/L  CV: RRR, S1S2,   GI: Soft, NT, bowel sounds  MS: No edema, + peripheral pulses, FROM all 4 extremity      < from: CT Angio Neck w/ IV Cont (01.06.20 @ 13:24) >  CTA neck: No significant stenosis. No dissection.      < end of copied text >

## 2020-01-08 ENCOUNTER — TRANSCRIPTION ENCOUNTER (OUTPATIENT)
Age: 85
End: 2020-01-08

## 2020-01-08 VITALS
SYSTOLIC BLOOD PRESSURE: 117 MMHG | DIASTOLIC BLOOD PRESSURE: 57 MMHG | TEMPERATURE: 97 F | RESPIRATION RATE: 18 BRPM | HEART RATE: 69 BPM

## 2020-01-08 LAB
ALBUMIN SERPL ELPH-MCNC: 3.5 G/DL — SIGNIFICANT CHANGE UP (ref 3.5–5.2)
ALP SERPL-CCNC: 74 U/L — SIGNIFICANT CHANGE UP (ref 30–115)
ALT FLD-CCNC: 17 U/L — SIGNIFICANT CHANGE UP (ref 0–41)
ANION GAP SERPL CALC-SCNC: 13 MMOL/L — SIGNIFICANT CHANGE UP (ref 7–14)
AST SERPL-CCNC: 19 U/L — SIGNIFICANT CHANGE UP (ref 0–41)
BASOPHILS # BLD AUTO: 0.01 K/UL — SIGNIFICANT CHANGE UP (ref 0–0.2)
BASOPHILS NFR BLD AUTO: 0.3 % — SIGNIFICANT CHANGE UP (ref 0–1)
BILIRUB SERPL-MCNC: 0.4 MG/DL — SIGNIFICANT CHANGE UP (ref 0.2–1.2)
BUN SERPL-MCNC: 15 MG/DL — SIGNIFICANT CHANGE UP (ref 10–20)
CALCIUM SERPL-MCNC: 8.3 MG/DL — LOW (ref 8.5–10.1)
CHLORIDE SERPL-SCNC: 105 MMOL/L — SIGNIFICANT CHANGE UP (ref 98–110)
CO2 SERPL-SCNC: 25 MMOL/L — SIGNIFICANT CHANGE UP (ref 17–32)
CREAT SERPL-MCNC: 0.9 MG/DL — SIGNIFICANT CHANGE UP (ref 0.7–1.5)
EOSINOPHIL # BLD AUTO: 0.08 K/UL — SIGNIFICANT CHANGE UP (ref 0–0.7)
EOSINOPHIL NFR BLD AUTO: 2.6 % — SIGNIFICANT CHANGE UP (ref 0–8)
GLUCOSE BLDC GLUCOMTR-MCNC: 176 MG/DL — HIGH (ref 70–99)
GLUCOSE BLDC GLUCOMTR-MCNC: 217 MG/DL — HIGH (ref 70–99)
GLUCOSE SERPL-MCNC: 173 MG/DL — HIGH (ref 70–99)
HCT VFR BLD CALC: 35.3 % — LOW (ref 42–52)
HGB BLD-MCNC: 11.7 G/DL — LOW (ref 14–18)
IMM GRANULOCYTES NFR BLD AUTO: 0 % — LOW (ref 0.1–0.3)
LYMPHOCYTES # BLD AUTO: 1.12 K/UL — LOW (ref 1.2–3.4)
LYMPHOCYTES # BLD AUTO: 35.8 % — SIGNIFICANT CHANGE UP (ref 20.5–51.1)
MAGNESIUM SERPL-MCNC: 2.1 MG/DL — SIGNIFICANT CHANGE UP (ref 1.8–2.4)
MCHC RBC-ENTMCNC: 30.2 PG — SIGNIFICANT CHANGE UP (ref 27–31)
MCHC RBC-ENTMCNC: 33.1 G/DL — SIGNIFICANT CHANGE UP (ref 32–37)
MCV RBC AUTO: 91 FL — SIGNIFICANT CHANGE UP (ref 80–94)
MONOCYTES # BLD AUTO: 0.42 K/UL — SIGNIFICANT CHANGE UP (ref 0.1–0.6)
MONOCYTES NFR BLD AUTO: 13.4 % — HIGH (ref 1.7–9.3)
NEUTROPHILS # BLD AUTO: 1.5 K/UL — SIGNIFICANT CHANGE UP (ref 1.4–6.5)
NEUTROPHILS NFR BLD AUTO: 47.9 % — SIGNIFICANT CHANGE UP (ref 42.2–75.2)
NRBC # BLD: 0 /100 WBCS — SIGNIFICANT CHANGE UP (ref 0–0)
PHOSPHATE SERPL-MCNC: 2.9 MG/DL — SIGNIFICANT CHANGE UP (ref 2.1–4.9)
PLATELET # BLD AUTO: 121 K/UL — LOW (ref 130–400)
POTASSIUM SERPL-MCNC: 4 MMOL/L — SIGNIFICANT CHANGE UP (ref 3.5–5)
POTASSIUM SERPL-SCNC: 4 MMOL/L — SIGNIFICANT CHANGE UP (ref 3.5–5)
PROT SERPL-MCNC: 5.3 G/DL — LOW (ref 6–8)
RBC # BLD: 3.88 M/UL — LOW (ref 4.7–6.1)
RBC # FLD: 13.2 % — SIGNIFICANT CHANGE UP (ref 11.5–14.5)
SODIUM SERPL-SCNC: 143 MMOL/L — SIGNIFICANT CHANGE UP (ref 135–146)
WBC # BLD: 3.13 K/UL — LOW (ref 4.8–10.8)
WBC # FLD AUTO: 3.13 K/UL — LOW (ref 4.8–10.8)

## 2020-01-08 RX ORDER — CEFPODOXIME PROXETIL 100 MG
1 TABLET ORAL
Qty: 8 | Refills: 0
Start: 2020-01-08 | End: 2020-01-11

## 2020-01-08 RX ADMIN — FINASTERIDE 5 MILLIGRAM(S): 5 TABLET, FILM COATED ORAL at 12:11

## 2020-01-08 RX ADMIN — CHLORHEXIDINE GLUCONATE 1 APPLICATION(S): 213 SOLUTION TOPICAL at 05:46

## 2020-01-08 RX ADMIN — Medication 1 MILLIGRAM(S): at 12:11

## 2020-01-08 RX ADMIN — GABAPENTIN 300 MILLIGRAM(S): 400 CAPSULE ORAL at 14:13

## 2020-01-08 RX ADMIN — MEROPENEM 100 MILLIGRAM(S): 1 INJECTION INTRAVENOUS at 05:41

## 2020-01-08 RX ADMIN — GABAPENTIN 300 MILLIGRAM(S): 400 CAPSULE ORAL at 05:41

## 2020-01-08 NOTE — PROGRESS NOTE ADULT - SUBJECTIVE AND OBJECTIVE BOX
Patient was seen and examined. Spoke with RN. Chart reviewed.    No events overnight.  Vital Signs Last 24 Hrs  T(F): 96.7 (08 Jan 2020 12:25), Max: 96.7 (08 Jan 2020 12:25)  HR: 69 (08 Jan 2020 12:25) (69 - 76)  BP: 117/57 (08 Jan 2020 12:25) (117/57 - 164/72)  SpO2: --  MEDICATIONS  (STANDING):  aspirin 325 milliGRAM(s) Oral <User Schedule>  atorvastatin 20 milliGRAM(s) Oral at bedtime  chlorhexidine 4% Liquid 1 Application(s) Topical <User Schedule>  enoxaparin Injectable 40 milliGRAM(s) SubCutaneous daily  finasteride 5 milliGRAM(s) Oral daily  folic acid 1 milliGRAM(s) Oral daily  furosemide    Tablet 40 milliGRAM(s) Oral <User Schedule>  gabapentin 300 milliGRAM(s) Oral three times a day  meropenem  IVPB 1000 milliGRAM(s) IV Intermittent every 8 hours  polyethylene glycol 3350 17 Gram(s) Oral daily  tamsulosin 0.4 milliGRAM(s) Oral at bedtime    MEDICATIONS  (PRN):  acetaminophen   Tablet .. 650 milliGRAM(s) Oral every 6 hours PRN Temp greater or equal to 38C (100.4F)  ondansetron Injectable 4 milliGRAM(s) IV Push every 4 hours PRN Nausea and/or Vomiting    Labs:                        11.7   3.13  )-----------( 121      ( 08 Jan 2020 05:57 )             35.3                         11.7   3.38  )-----------( 110      ( 07 Jan 2020 05:42 )             35.8     08 Jan 2020 05:57    143    |  105    |  15     ----------------------------<  173    4.0     |  25     |  0.9    07 Jan 2020 05:42    143    |  107    |  15     ----------------------------<  155    3.9     |  24     |  1.0      Ca    8.3        08 Jan 2020 05:57  Ca    8.0        07 Jan 2020 05:42  Phos  2.9       08 Jan 2020 05:57  Mg     2.1       08 Jan 2020 05:57    TPro  5.3    /  Alb  3.5    /  TBili  0.4    /  DBili  x      /  AST  19     /  ALT  17     /  AlkPhos  74     08 Jan 2020 05:57  TPro  5.3    /  Alb  3.2    /  TBili  0.5    /  DBili  x      /  AST  22     /  ALT  17     /  AlkPhos  68     07 Jan 2020 05:42            Radiology:    General: comfortable, NAD  Neurology: A&Ox3, nonfocal  Head:  Normocephalic, atraumatic  ENT:  Mucosa moist, no ulcerations  Neck:  Supple, no JVD,   Skin: no breakdowns (as per RN)  Resp: CTA B/L  CV: RRR, S1S2,   GI: Soft, NT, bowel sounds  MS: No edema, + peripheral pulses, FROM all 4 extremity

## 2020-01-08 NOTE — DISCHARGE NOTE PROVIDER - HOSPITAL COURSE
85 y/o M PMHx CAD s/p CABG, HTN, DM, DLD, left eye mole? presenting with difficulty ambulating and confusion. Pt says he was on the couch yesterday afternoon when he got up to use the restroom he felt very wobbly. His wife notes that he was so unsteady that he almost fell and she had to assist him to the restroom. She also notes that he was a bit confused not answering normally, she notes that this progressed by the evening so she brought him to the ED.    Pt says he is feeling well, no longer feels confused, per wife mentation is back to his baseline. He notes that he had an episode of dysuria "burning" this morning in the ED but none previously at home, no increased frequency or other urinary symptoms. He denies headache, N/V/F/C, SOB, cough, ARAUZ, chest pain, abdominal pain, diarrhea, constipation, LE swelling, recent weight loss travel or sick contacts.        Pt given 1L LR and 1gram Rocephin in ED.        During my exam pt suddenly had episode of nausea followed by large yellow emesis, he denied further nausea but started having shivers. Pt was changed by ED staff, he then became uncomfortable in bed moving around, tachycardic and more confused, repeat tmp 104.4 Pt was given tylenol suppository and reglan for further nausea.    Pt was continued but overnight pt acutely worsened sepsis and abx were broadened. Pt improved in the AM. Urine culture came back with sensitivites. Pt stable and transistioned oral Abx and ready for d/c with o/p follow up.

## 2020-01-08 NOTE — DISCHARGE NOTE NURSING/CASE MANAGEMENT/SOCIAL WORK - PATIENT PORTAL LINK FT
You can access the FollowMyHealth Patient Portal offered by [facility] by registering at the following website: [email or web address]. By joining [employer]’s FollowMyHealth portal, you will also be able to view your health information using other applications (apps) compatible with our system.

## 2020-01-08 NOTE — DISCHARGE NOTE PROVIDER - NSDCMRMEDTOKEN_GEN_ALL_CORE_FT
aspirin 325 mg oral tablet: 1 tab(s) orally every 48 hours  atorvastatin 20 mg oral tablet: 1 tab(s) orally once a day  Colace 100 mg oral capsule: 1 cap(s) orally 3 times a day  dutasteride 0.5 mg oral capsule: 1 cap(s) orally once a day  folic acid 1 mg oral tablet: 1 tab(s) orally once a day  gabapentin 300 mg oral tablet: 1 tab(s) orally 3 times a day  Januvia 100 mg oral tablet: 1 tab(s) orally once a day  Lasix 40 mg oral tablet: 1 tab(s) orally once a week on Sunday  metFORMIN 500 mg oral tablet: 1 tab(s) orally 2 times a day  metoprolol succinate 25 mg oral tablet, extended release: 1 tab(s) orally once a day  tamsulosin 0.4 mg oral capsule: 1 cap(s) orally once a day

## 2020-01-08 NOTE — DISCHARGE NOTE PROVIDER - NSDCCPCAREPLAN_GEN_ALL_CORE_FT
PRINCIPAL DISCHARGE DIAGNOSIS  Diagnosis: UTI (urinary tract infection)  Assessment and Plan of Treatment: You were noted either on arrival or during this hospitalization to have a Urinary Tract Infection. You may have already been treated and completed the antibiotics, please refer to the list of medications present on your discharge paperwork. If you notice that there are antibiotics listed, these may be to treat your infection, be sure to complete taking the full course, whether you have symptoms or not, as prescribed.  It is not necessary to repeat a urine test to see if the infection is gone, it is assumed that after treatment it should have resolved. However, if you continue to have symptoms, please see your Primary Care doctor or return to the ER.      SECONDARY DISCHARGE DIAGNOSES  Diagnosis: Metabolic encephalopathy  Assessment and Plan of Treatment:     Diagnosis: UTI (urinary tract infection)  Assessment and Plan of Treatment:

## 2020-01-08 NOTE — DISCHARGE NOTE PROVIDER - NSDCPNSUBOBJ_GEN_ALL_CORE
<<<RESIDENT DISCHARGE NOTE>>>         CHAPINCITO YBARRA    MRN-[id number]        VITAL SIGNS:    T(F): 96.1 (01-08-20 @ 05:00), Max: 96.6 (01-07-20 @ 20:30)    HR: 76 (01-08-20 @ 05:00)    BP: 141/75 (01-08-20 @ 05:00)    SpO2: --            PHYSICAL EXAMINATION:    General:     Head & Neck:    Pulmonary:    Cardiovascular:    Gastrointestinal/Abdomen & Pelvis:    Neurologic/Motor:        TEST RESULTS:                            11.7     3.13  )-----------( 121      ( 08 Jan 2020 05:57 )               35.3             01-08        143  |  105  |  15    ----------------------------<  173<H>    4.0   |  25  |  0.9        Ca    8.3<L>      08 Jan 2020 05:57    Phos  2.9     01-08    Mg     2.1     01-08        TPro  5.3<L>  /  Alb  3.5  /  TBili  0.4  /  DBili  x   /  AST  19  /  ALT  17  /  AlkPhos  74  01-08            FINAL DISCHARGE INTERVIEW:  Resident(s) Present: (Name:___Sherie__________), RN Present: (Name:  ____pushpa_______)        DISCHARGE MEDICATION RECONCILIATION  reviewed with Attending (Name:___jonel ________)        DISPOSITION:   [  ] Home,    [ X ] Home with Visiting Nursing Services,   [    ]  SNF/ NH,    [   ] Acute Rehab (4A),   [   ] Other (Specify:_________)

## 2020-01-10 LAB
CULTURE RESULTS: SIGNIFICANT CHANGE UP
SPECIMEN SOURCE: SIGNIFICANT CHANGE UP

## 2020-01-13 DIAGNOSIS — R68.0 HYPOTHERMIA, NOT ASSOCIATED WITH LOW ENVIRONMENTAL TEMPERATURE: ICD-10-CM

## 2020-01-13 DIAGNOSIS — D31.92 BENIGN NEOPLASM OF UNSPECIFIED PART OF LEFT EYE: ICD-10-CM

## 2020-01-13 DIAGNOSIS — E11.9 TYPE 2 DIABETES MELLITUS WITHOUT COMPLICATIONS: ICD-10-CM

## 2020-01-13 DIAGNOSIS — N39.0 URINARY TRACT INFECTION, SITE NOT SPECIFIED: ICD-10-CM

## 2020-01-13 DIAGNOSIS — R26.9 UNSPECIFIED ABNORMALITIES OF GAIT AND MOBILITY: ICD-10-CM

## 2020-01-13 DIAGNOSIS — G93.41 METABOLIC ENCEPHALOPATHY: ICD-10-CM

## 2020-01-13 DIAGNOSIS — I10 ESSENTIAL (PRIMARY) HYPERTENSION: ICD-10-CM

## 2020-01-13 DIAGNOSIS — A41.9 SEPSIS, UNSPECIFIED ORGANISM: ICD-10-CM

## 2020-01-13 DIAGNOSIS — I25.10 ATHEROSCLEROTIC HEART DISEASE OF NATIVE CORONARY ARTERY WITHOUT ANGINA PECTORIS: ICD-10-CM

## 2020-01-13 DIAGNOSIS — D69.6 THROMBOCYTOPENIA, UNSPECIFIED: ICD-10-CM

## 2020-01-13 DIAGNOSIS — Z79.82 LONG TERM (CURRENT) USE OF ASPIRIN: ICD-10-CM

## 2020-01-13 DIAGNOSIS — Z95.1 PRESENCE OF AORTOCORONARY BYPASS GRAFT: ICD-10-CM

## 2020-01-13 DIAGNOSIS — Z79.84 LONG TERM (CURRENT) USE OF ORAL HYPOGLYCEMIC DRUGS: ICD-10-CM

## 2020-01-13 DIAGNOSIS — N40.0 BENIGN PROSTATIC HYPERPLASIA WITHOUT LOWER URINARY TRACT SYMPTOMS: ICD-10-CM

## 2020-02-04 ENCOUNTER — APPOINTMENT (OUTPATIENT)
Dept: CARDIOLOGY | Facility: CLINIC | Age: 85
End: 2020-02-04
Payer: MEDICARE

## 2020-02-04 PROCEDURE — 99214 OFFICE O/P EST MOD 30 MIN: CPT

## 2020-02-04 PROCEDURE — 93000 ELECTROCARDIOGRAM COMPLETE: CPT

## 2020-08-11 ENCOUNTER — RECORD ABSTRACTING (OUTPATIENT)
Age: 85
End: 2020-08-11

## 2020-08-11 DIAGNOSIS — Z87.898 PERSONAL HISTORY OF OTHER SPECIFIED CONDITIONS: ICD-10-CM

## 2020-08-11 RX ORDER — ATORVASTATIN CALCIUM 20 MG/1
20 TABLET, FILM COATED ORAL DAILY
Refills: 0 | Status: ACTIVE | COMMUNITY

## 2020-08-11 RX ORDER — FOLIC ACID 1 MG/1
1 TABLET ORAL DAILY
Refills: 0 | Status: ACTIVE | COMMUNITY

## 2020-08-11 RX ORDER — SITAGLIPTIN 100 MG/1
100 TABLET, FILM COATED ORAL
Refills: 0 | Status: ACTIVE | COMMUNITY

## 2020-08-25 ENCOUNTER — APPOINTMENT (OUTPATIENT)
Dept: CARDIOLOGY | Facility: CLINIC | Age: 85
End: 2020-08-25
Payer: MEDICARE

## 2020-08-25 VITALS
SYSTOLIC BLOOD PRESSURE: 120 MMHG | HEART RATE: 58 BPM | DIASTOLIC BLOOD PRESSURE: 80 MMHG | BODY MASS INDEX: 31.99 KG/M2 | WEIGHT: 216 LBS | HEIGHT: 69 IN

## 2020-08-25 DIAGNOSIS — I49.49 OTHER PREMATURE DEPOLARIZATION: ICD-10-CM

## 2020-08-25 DIAGNOSIS — Z00.00 ENCOUNTER FOR GENERAL ADULT MEDICAL EXAMINATION W/OUT ABNORMAL FINDINGS: ICD-10-CM

## 2020-08-25 PROCEDURE — 93000 ELECTROCARDIOGRAM COMPLETE: CPT

## 2020-08-25 PROCEDURE — 99214 OFFICE O/P EST MOD 30 MIN: CPT

## 2020-08-25 RX ORDER — APIXABAN 2.5 MG/1
2.5 TABLET, FILM COATED ORAL TWICE DAILY
Refills: 0 | Status: ACTIVE | COMMUNITY

## 2020-08-25 RX ORDER — ASPIRIN 325 MG/1
325 TABLET, COATED ORAL
Refills: 0 | Status: DISCONTINUED | COMMUNITY
End: 2020-08-25

## 2020-08-25 NOTE — DISCUSSION/SUMMARY
[FreeTextEntry1] : Start eliquis 2.5 mg Q12h\par D/C ASA\par Holter monitor \par 2D echo doppler\par Fasting lipid profile CBC BMP hepatic panel\par RV in 2 weeks.

## 2020-08-25 NOTE — REASON FOR VISIT
[FreeTextEntry1] : Patient presents for a follow up visit. He was in atrial fibrillation on today's office visit. He was unaware of this.

## 2020-08-25 NOTE — PHYSICAL EXAM
[General Appearance - Well Nourished] : well nourished [General Appearance - Well Developed] : well developed [Normal Conjunctiva] : the conjunctiva exhibited no abnormalities [General Appearance - In No Acute Distress] : no acute distress [Normal Oral Mucosa] : normal oral mucosa [Normal Jugular Venous V Waves Present] : normal jugular venous V waves present [Arterial Pulses Normal] : the arterial pulses were normal [Heart Sounds] : normal S1 and S2 [Edema] : no peripheral edema present [Abdomen Tenderness] : non-tender [Abdomen Soft] : soft [Nail Clubbing] : no clubbing of the fingernails [Abnormal Walk] : normal gait [Affect] : the affect was normal [Cyanosis, Localized] : no localized cyanosis [Oriented To Time, Place, And Person] : oriented to person, place, and time [Memory Recent] : recent memory was not impaired [FreeTextEntry1] : Irregular rhytmn

## 2020-08-26 ENCOUNTER — APPOINTMENT (OUTPATIENT)
Dept: CARDIOLOGY | Facility: CLINIC | Age: 85
End: 2020-08-26

## 2020-09-11 ENCOUNTER — APPOINTMENT (OUTPATIENT)
Dept: CARDIOLOGY | Facility: CLINIC | Age: 85
End: 2020-09-11
Payer: MEDICARE

## 2020-09-11 PROCEDURE — 0296T: CPT

## 2020-10-02 ENCOUNTER — APPOINTMENT (OUTPATIENT)
Dept: CARDIOLOGY | Facility: CLINIC | Age: 85
End: 2020-10-02

## 2020-10-21 ENCOUNTER — APPOINTMENT (OUTPATIENT)
Dept: CARDIOLOGY | Facility: CLINIC | Age: 85
End: 2020-10-21
Payer: MEDICARE

## 2020-10-21 PROCEDURE — 93306 TTE W/DOPPLER COMPLETE: CPT

## 2020-10-21 PROCEDURE — 99072 ADDL SUPL MATRL&STAF TM PHE: CPT

## 2021-03-09 ENCOUNTER — APPOINTMENT (OUTPATIENT)
Dept: CARDIOLOGY | Facility: CLINIC | Age: 86
End: 2021-03-09
Payer: MEDICARE

## 2021-03-09 VITALS
TEMPERATURE: 98.4 F | WEIGHT: 212 LBS | SYSTOLIC BLOOD PRESSURE: 110 MMHG | BODY MASS INDEX: 31.4 KG/M2 | DIASTOLIC BLOOD PRESSURE: 60 MMHG | HEART RATE: 48 BPM | HEIGHT: 69 IN

## 2021-03-09 PROCEDURE — 99072 ADDL SUPL MATRL&STAF TM PHE: CPT

## 2021-03-09 PROCEDURE — 93000 ELECTROCARDIOGRAM COMPLETE: CPT

## 2021-03-09 PROCEDURE — 99214 OFFICE O/P EST MOD 30 MIN: CPT

## 2021-03-09 NOTE — DISCUSSION/SUMMARY
[FreeTextEntry1] : Maintain eliquis 2.5 mg Q12h\par Results of patient's echo doppler were reviewed with him in detail.\par Patient was counseled on his elevated serum glucose and his remaining lab values.\par He was counseled on improving his exercise to help raise his HDL cholesterol and to help lower his serum glucose levels.\par Fasting lipid profile CBC BMP hepatic panel\par RV in 6 months

## 2021-03-09 NOTE — ASSESSMENT
[FreeTextEntry1] : Atrial fibrillation\par CAD\par S/P CABG\par Mild to moderate MR\par Mild AI\par Hypertension\par CKD\par DM

## 2021-03-09 NOTE — HISTORY OF PRESENT ILLNESS
[FreeTextEntry1] : ASHD\par S/P CABG\par Hypertension\par MR mild to moderate\par AI mild\par Atrial fibrillation\par DM

## 2021-03-09 NOTE — PHYSICAL EXAM
[General Appearance - Well Developed] : well developed [General Appearance - Well Nourished] : well nourished [General Appearance - In No Acute Distress] : no acute distress [Normal Conjunctiva] : the conjunctiva exhibited no abnormalities [Normal Oral Mucosa] : normal oral mucosa [Normal Jugular Venous V Waves Present] : normal jugular venous V waves present [Heart Sounds] : normal S1 and S2 [Arterial Pulses Normal] : the arterial pulses were normal [Edema] : no peripheral edema present [Abdomen Soft] : soft [Abdomen Tenderness] : non-tender [Abnormal Walk] : normal gait [Nail Clubbing] : no clubbing of the fingernails [Cyanosis, Localized] : no localized cyanosis [Oriented To Time, Place, And Person] : oriented to person, place, and time [Affect] : the affect was normal [Memory Recent] : recent memory was not impaired [FreeTextEntry1] : Irregular rhythm, grade II/VI systolic murmur at LSB

## 2021-04-07 ENCOUNTER — INPATIENT (INPATIENT)
Facility: HOSPITAL | Age: 86
LOS: 1 days | Discharge: HOME HEALTH | End: 2021-04-09
Attending: HOSPITALIST | Admitting: HOSPITALIST
Payer: MEDICARE

## 2021-04-07 VITALS
HEIGHT: 69 IN | SYSTOLIC BLOOD PRESSURE: 137 MMHG | HEART RATE: 67 BPM | RESPIRATION RATE: 18 BRPM | TEMPERATURE: 102 F | OXYGEN SATURATION: 96 % | WEIGHT: 220.02 LBS | DIASTOLIC BLOOD PRESSURE: 87 MMHG

## 2021-04-07 DIAGNOSIS — E83.42 HYPOMAGNESEMIA: ICD-10-CM

## 2021-04-07 DIAGNOSIS — I10 ESSENTIAL (PRIMARY) HYPERTENSION: ICD-10-CM

## 2021-04-07 DIAGNOSIS — N39.0 URINARY TRACT INFECTION, SITE NOT SPECIFIED: ICD-10-CM

## 2021-04-07 DIAGNOSIS — Z29.9 ENCOUNTER FOR PROPHYLACTIC MEASURES, UNSPECIFIED: ICD-10-CM

## 2021-04-07 DIAGNOSIS — I25.10 ATHEROSCLEROTIC HEART DISEASE OF NATIVE CORONARY ARTERY WITHOUT ANGINA PECTORIS: ICD-10-CM

## 2021-04-07 DIAGNOSIS — E11.9 TYPE 2 DIABETES MELLITUS WITHOUT COMPLICATIONS: ICD-10-CM

## 2021-04-07 DIAGNOSIS — I50.21 ACUTE SYSTOLIC (CONGESTIVE) HEART FAILURE: ICD-10-CM

## 2021-04-07 DIAGNOSIS — Z71.89 OTHER SPECIFIED COUNSELING: ICD-10-CM

## 2021-04-07 DIAGNOSIS — N40.1 BENIGN PROSTATIC HYPERPLASIA WITH LOWER URINARY TRACT SYMPTOMS: ICD-10-CM

## 2021-04-07 DIAGNOSIS — Z98.49 CATARACT EXTRACTION STATUS, UNSPECIFIED EYE: Chronic | ICD-10-CM

## 2021-04-07 DIAGNOSIS — Z95.1 PRESENCE OF AORTOCORONARY BYPASS GRAFT: Chronic | ICD-10-CM

## 2021-04-07 DIAGNOSIS — N28.9 DISORDER OF KIDNEY AND URETER, UNSPECIFIED: ICD-10-CM

## 2021-04-07 LAB
ALBUMIN SERPL ELPH-MCNC: 3.9 G/DL — SIGNIFICANT CHANGE UP (ref 3.5–5.2)
ALP SERPL-CCNC: 95 U/L — SIGNIFICANT CHANGE UP (ref 30–115)
ALT FLD-CCNC: 12 U/L — SIGNIFICANT CHANGE UP (ref 0–41)
ANION GAP SERPL CALC-SCNC: 12 MMOL/L — SIGNIFICANT CHANGE UP (ref 7–14)
APPEARANCE UR: CLEAR — SIGNIFICANT CHANGE UP
APTT BLD: 31.2 SEC — SIGNIFICANT CHANGE UP (ref 27–39.2)
AST SERPL-CCNC: 15 U/L — SIGNIFICANT CHANGE UP (ref 0–41)
BACTERIA # UR AUTO: ABNORMAL
BASOPHILS # BLD AUTO: 0.02 K/UL — SIGNIFICANT CHANGE UP (ref 0–0.2)
BASOPHILS NFR BLD AUTO: 0.4 % — SIGNIFICANT CHANGE UP (ref 0–1)
BILIRUB SERPL-MCNC: 0.9 MG/DL — SIGNIFICANT CHANGE UP (ref 0.2–1.2)
BILIRUB UR-MCNC: NEGATIVE — SIGNIFICANT CHANGE UP
BUN SERPL-MCNC: 18 MG/DL — SIGNIFICANT CHANGE UP (ref 10–20)
CALCIUM SERPL-MCNC: 8.6 MG/DL — SIGNIFICANT CHANGE UP (ref 8.5–10.1)
CHLORIDE SERPL-SCNC: 100 MMOL/L — SIGNIFICANT CHANGE UP (ref 98–110)
CK MB CFR SERPL CALC: 4.7 NG/ML — SIGNIFICANT CHANGE UP (ref 0.6–6.3)
CK MB CFR SERPL CALC: 5.7 NG/ML — SIGNIFICANT CHANGE UP (ref 0.6–6.3)
CK SERPL-CCNC: 675 U/L — HIGH (ref 0–225)
CK SERPL-CCNC: 912 U/L — HIGH (ref 0–225)
CO2 SERPL-SCNC: 24 MMOL/L — SIGNIFICANT CHANGE UP (ref 17–32)
COLOR SPEC: YELLOW — SIGNIFICANT CHANGE UP
CREAT SERPL-MCNC: 1.3 MG/DL — SIGNIFICANT CHANGE UP (ref 0.7–1.5)
DIFF PNL FLD: ABNORMAL
EOSINOPHIL # BLD AUTO: 0.03 K/UL — SIGNIFICANT CHANGE UP (ref 0–0.7)
EOSINOPHIL NFR BLD AUTO: 0.5 % — SIGNIFICANT CHANGE UP (ref 0–8)
EPI CELLS # UR: ABNORMAL /HPF
GLUCOSE BLDC GLUCOMTR-MCNC: 136 MG/DL — HIGH (ref 70–99)
GLUCOSE BLDC GLUCOMTR-MCNC: 160 MG/DL — HIGH (ref 70–99)
GLUCOSE BLDC GLUCOMTR-MCNC: 167 MG/DL — HIGH (ref 70–99)
GLUCOSE BLDC GLUCOMTR-MCNC: 185 MG/DL — HIGH (ref 70–99)
GLUCOSE BLDC GLUCOMTR-MCNC: 185 MG/DL — HIGH (ref 70–99)
GLUCOSE SERPL-MCNC: 160 MG/DL — HIGH (ref 70–99)
GLUCOSE UR QL: NEGATIVE MG/DL — SIGNIFICANT CHANGE UP
HCT VFR BLD CALC: 37.8 % — LOW (ref 42–52)
HGB BLD-MCNC: 12.4 G/DL — LOW (ref 14–18)
IMM GRANULOCYTES NFR BLD AUTO: 0.2 % — SIGNIFICANT CHANGE UP (ref 0.1–0.3)
INR BLD: 1.67 RATIO — HIGH (ref 0.65–1.3)
KETONES UR-MCNC: NEGATIVE — SIGNIFICANT CHANGE UP
LACTATE SERPL-SCNC: 2 MMOL/L — SIGNIFICANT CHANGE UP (ref 0.7–2)
LEUKOCYTE ESTERASE UR-ACNC: ABNORMAL
LYMPHOCYTES # BLD AUTO: 0.75 K/UL — LOW (ref 1.2–3.4)
LYMPHOCYTES # BLD AUTO: 13.7 % — LOW (ref 20.5–51.1)
MAGNESIUM SERPL-MCNC: 1.6 MG/DL — LOW (ref 1.8–2.4)
MCHC RBC-ENTMCNC: 29.2 PG — SIGNIFICANT CHANGE UP (ref 27–31)
MCHC RBC-ENTMCNC: 32.8 G/DL — SIGNIFICANT CHANGE UP (ref 32–37)
MCV RBC AUTO: 88.9 FL — SIGNIFICANT CHANGE UP (ref 80–94)
MONOCYTES # BLD AUTO: 0.63 K/UL — HIGH (ref 0.1–0.6)
MONOCYTES NFR BLD AUTO: 11.5 % — HIGH (ref 1.7–9.3)
NEUTROPHILS # BLD AUTO: 4.02 K/UL — SIGNIFICANT CHANGE UP (ref 1.4–6.5)
NEUTROPHILS NFR BLD AUTO: 73.7 % — SIGNIFICANT CHANGE UP (ref 42.2–75.2)
NITRITE UR-MCNC: POSITIVE
NRBC # BLD: 0 /100 WBCS — SIGNIFICANT CHANGE UP (ref 0–0)
PH UR: 5.5 — SIGNIFICANT CHANGE UP (ref 5–8)
PLATELET # BLD AUTO: 99 K/UL — LOW (ref 130–400)
POTASSIUM SERPL-MCNC: 3.7 MMOL/L — SIGNIFICANT CHANGE UP (ref 3.5–5)
POTASSIUM SERPL-SCNC: 3.7 MMOL/L — SIGNIFICANT CHANGE UP (ref 3.5–5)
PROT SERPL-MCNC: 6 G/DL — SIGNIFICANT CHANGE UP (ref 6–8)
PROT UR-MCNC: NEGATIVE MG/DL — SIGNIFICANT CHANGE UP
PROTHROM AB SERPL-ACNC: 19.2 SEC — HIGH (ref 9.95–12.87)
RAPID RVP RESULT: SIGNIFICANT CHANGE UP
RBC # BLD: 4.25 M/UL — LOW (ref 4.7–6.1)
RBC # FLD: 14.1 % — SIGNIFICANT CHANGE UP (ref 11.5–14.5)
RBC CASTS # UR COMP ASSIST: ABNORMAL /HPF
SARS-COV-2 RNA SPEC QL NAA+PROBE: SIGNIFICANT CHANGE UP
SODIUM SERPL-SCNC: 136 MMOL/L — SIGNIFICANT CHANGE UP (ref 135–146)
SP GR SPEC: 1.01 — SIGNIFICANT CHANGE UP (ref 1.01–1.03)
TROPONIN T SERPL-MCNC: 0.01 NG/ML — SIGNIFICANT CHANGE UP
TROPONIN T SERPL-MCNC: 0.02 NG/ML — HIGH
TROPONIN T SERPL-MCNC: 0.02 NG/ML — HIGH
UROBILINOGEN FLD QL: 0.2 MG/DL — SIGNIFICANT CHANGE UP (ref 0.2–0.2)
WBC # BLD: 5.46 K/UL — SIGNIFICANT CHANGE UP (ref 4.8–10.8)
WBC # FLD AUTO: 5.46 K/UL — SIGNIFICANT CHANGE UP (ref 4.8–10.8)
WBC UR QL: ABNORMAL /HPF

## 2021-04-07 PROCEDURE — 70450 CT HEAD/BRAIN W/O DYE: CPT | Mod: 26

## 2021-04-07 PROCEDURE — 99285 EMERGENCY DEPT VISIT HI MDM: CPT

## 2021-04-07 PROCEDURE — 71045 X-RAY EXAM CHEST 1 VIEW: CPT | Mod: 26

## 2021-04-07 PROCEDURE — 74177 CT ABD & PELVIS W/CONTRAST: CPT | Mod: 26

## 2021-04-07 PROCEDURE — 99223 1ST HOSP IP/OBS HIGH 75: CPT

## 2021-04-07 RX ORDER — DOCUSATE SODIUM 100 MG
1 CAPSULE ORAL
Qty: 0 | Refills: 0 | DISCHARGE

## 2021-04-07 RX ORDER — ATORVASTATIN CALCIUM 80 MG/1
1 TABLET, FILM COATED ORAL
Qty: 0 | Refills: 0 | DISCHARGE

## 2021-04-07 RX ORDER — TAMSULOSIN HYDROCHLORIDE 0.4 MG/1
1 CAPSULE ORAL
Qty: 0 | Refills: 0 | DISCHARGE

## 2021-04-07 RX ORDER — FUROSEMIDE 40 MG
40 TABLET ORAL DAILY
Refills: 0 | Status: DISCONTINUED | OUTPATIENT
Start: 2021-04-08 | End: 2021-04-08

## 2021-04-07 RX ORDER — INSULIN LISPRO 100/ML
VIAL (ML) SUBCUTANEOUS AT BEDTIME
Refills: 0 | Status: DISCONTINUED | OUTPATIENT
Start: 2021-04-07 | End: 2021-04-09

## 2021-04-07 RX ORDER — ATORVASTATIN CALCIUM 80 MG/1
20 TABLET, FILM COATED ORAL AT BEDTIME
Refills: 0 | Status: DISCONTINUED | OUTPATIENT
Start: 2021-04-07 | End: 2021-04-09

## 2021-04-07 RX ORDER — MAGNESIUM SULFATE 500 MG/ML
2 VIAL (ML) INJECTION ONCE
Refills: 0 | Status: COMPLETED | OUTPATIENT
Start: 2021-04-07 | End: 2021-04-07

## 2021-04-07 RX ORDER — DEXTROSE 50 % IN WATER 50 %
25 SYRINGE (ML) INTRAVENOUS ONCE
Refills: 0 | Status: DISCONTINUED | OUTPATIENT
Start: 2021-04-07 | End: 2021-04-09

## 2021-04-07 RX ORDER — SODIUM CHLORIDE 9 MG/ML
1000 INJECTION, SOLUTION INTRAVENOUS
Refills: 0 | Status: DISCONTINUED | OUTPATIENT
Start: 2021-04-07 | End: 2021-04-09

## 2021-04-07 RX ORDER — CEFTRIAXONE 500 MG/1
1000 INJECTION, POWDER, FOR SOLUTION INTRAMUSCULAR; INTRAVENOUS EVERY 24 HOURS
Refills: 0 | Status: DISCONTINUED | OUTPATIENT
Start: 2021-04-08 | End: 2021-04-09

## 2021-04-07 RX ORDER — INSULIN LISPRO 100/ML
VIAL (ML) SUBCUTANEOUS
Refills: 0 | Status: DISCONTINUED | OUTPATIENT
Start: 2021-04-07 | End: 2021-04-09

## 2021-04-07 RX ORDER — INSULIN LISPRO 100/ML
3 VIAL (ML) SUBCUTANEOUS
Refills: 0 | Status: DISCONTINUED | OUTPATIENT
Start: 2021-04-07 | End: 2021-04-07

## 2021-04-07 RX ORDER — ACETAMINOPHEN 500 MG
975 TABLET ORAL ONCE
Refills: 0 | Status: COMPLETED | OUTPATIENT
Start: 2021-04-07 | End: 2021-04-07

## 2021-04-07 RX ORDER — ASPIRIN/CALCIUM CARB/MAGNESIUM 324 MG
1 TABLET ORAL
Qty: 0 | Refills: 0 | DISCHARGE

## 2021-04-07 RX ORDER — FUROSEMIDE 40 MG
1 TABLET ORAL
Qty: 0 | Refills: 0 | DISCHARGE

## 2021-04-07 RX ORDER — TAMSULOSIN HYDROCHLORIDE 0.4 MG/1
0.4 CAPSULE ORAL AT BEDTIME
Refills: 0 | Status: DISCONTINUED | OUTPATIENT
Start: 2021-04-07 | End: 2021-04-09

## 2021-04-07 RX ORDER — FINASTERIDE 5 MG/1
5 TABLET, FILM COATED ORAL DAILY
Refills: 0 | Status: DISCONTINUED | OUTPATIENT
Start: 2021-04-07 | End: 2021-04-09

## 2021-04-07 RX ORDER — FUROSEMIDE 40 MG
40 TABLET ORAL ONCE
Refills: 0 | Status: COMPLETED | OUTPATIENT
Start: 2021-04-07 | End: 2021-04-07

## 2021-04-07 RX ORDER — IBUPROFEN 200 MG
600 TABLET ORAL EVERY 8 HOURS
Refills: 0 | Status: DISCONTINUED | OUTPATIENT
Start: 2021-04-07 | End: 2021-04-09

## 2021-04-07 RX ORDER — GLUCAGON INJECTION, SOLUTION 0.5 MG/.1ML
1 INJECTION, SOLUTION SUBCUTANEOUS ONCE
Refills: 0 | Status: DISCONTINUED | OUTPATIENT
Start: 2021-04-07 | End: 2021-04-09

## 2021-04-07 RX ORDER — SODIUM CHLORIDE 9 MG/ML
1000 INJECTION INTRAMUSCULAR; INTRAVENOUS; SUBCUTANEOUS
Refills: 0 | Status: DISCONTINUED | OUTPATIENT
Start: 2021-04-07 | End: 2021-04-07

## 2021-04-07 RX ORDER — METFORMIN HYDROCHLORIDE 850 MG/1
1 TABLET ORAL
Qty: 0 | Refills: 0 | DISCHARGE

## 2021-04-07 RX ORDER — METOPROLOL TARTRATE 50 MG
25 TABLET ORAL DAILY
Refills: 0 | Status: DISCONTINUED | OUTPATIENT
Start: 2021-04-07 | End: 2021-04-09

## 2021-04-07 RX ORDER — SITAGLIPTIN 50 MG/1
1 TABLET, FILM COATED ORAL
Qty: 0 | Refills: 0 | DISCHARGE

## 2021-04-07 RX ORDER — DEXTROSE 50 % IN WATER 50 %
12.5 SYRINGE (ML) INTRAVENOUS ONCE
Refills: 0 | Status: DISCONTINUED | OUTPATIENT
Start: 2021-04-07 | End: 2021-04-09

## 2021-04-07 RX ORDER — DUTASTERIDE 0.5 MG/1
1 CAPSULE, LIQUID FILLED ORAL
Qty: 0 | Refills: 0 | DISCHARGE

## 2021-04-07 RX ORDER — APIXABAN 2.5 MG/1
1 TABLET, FILM COATED ORAL
Qty: 0 | Refills: 0 | DISCHARGE

## 2021-04-07 RX ORDER — INSULIN GLARGINE 100 [IU]/ML
10 INJECTION, SOLUTION SUBCUTANEOUS AT BEDTIME
Refills: 0 | Status: DISCONTINUED | OUTPATIENT
Start: 2021-04-07 | End: 2021-04-07

## 2021-04-07 RX ORDER — FOLIC ACID 0.8 MG
1 TABLET ORAL
Qty: 0 | Refills: 0 | DISCHARGE

## 2021-04-07 RX ORDER — GABAPENTIN 400 MG/1
1 CAPSULE ORAL
Qty: 0 | Refills: 0 | DISCHARGE

## 2021-04-07 RX ORDER — CEFTRIAXONE 500 MG/1
1000 INJECTION, POWDER, FOR SOLUTION INTRAMUSCULAR; INTRAVENOUS ONCE
Refills: 0 | Status: COMPLETED | OUTPATIENT
Start: 2021-04-07 | End: 2021-04-07

## 2021-04-07 RX ORDER — DEXTROSE 50 % IN WATER 50 %
15 SYRINGE (ML) INTRAVENOUS ONCE
Refills: 0 | Status: DISCONTINUED | OUTPATIENT
Start: 2021-04-07 | End: 2021-04-09

## 2021-04-07 RX ORDER — ACETAMINOPHEN 500 MG
650 TABLET ORAL EVERY 4 HOURS
Refills: 0 | Status: DISCONTINUED | OUTPATIENT
Start: 2021-04-07 | End: 2021-04-09

## 2021-04-07 RX ORDER — METOPROLOL TARTRATE 50 MG
1 TABLET ORAL
Qty: 0 | Refills: 0 | DISCHARGE

## 2021-04-07 RX ORDER — GABAPENTIN 400 MG/1
300 CAPSULE ORAL THREE TIMES A DAY
Refills: 0 | Status: DISCONTINUED | OUTPATIENT
Start: 2021-04-07 | End: 2021-04-09

## 2021-04-07 RX ORDER — HEPARIN SODIUM 5000 [USP'U]/ML
5000 INJECTION INTRAVENOUS; SUBCUTANEOUS EVERY 12 HOURS
Refills: 0 | Status: DISCONTINUED | OUTPATIENT
Start: 2021-04-07 | End: 2021-04-07

## 2021-04-07 RX ADMIN — Medication 50 GRAM(S): at 05:56

## 2021-04-07 RX ADMIN — CEFTRIAXONE 100 MILLIGRAM(S): 500 INJECTION, POWDER, FOR SOLUTION INTRAMUSCULAR; INTRAVENOUS at 05:59

## 2021-04-07 RX ADMIN — Medication 0: at 21:18

## 2021-04-07 RX ADMIN — Medication 975 MILLIGRAM(S): at 09:06

## 2021-04-07 RX ADMIN — SODIUM CHLORIDE 500 MILLILITER(S): 9 INJECTION INTRAMUSCULAR; INTRAVENOUS; SUBCUTANEOUS at 05:56

## 2021-04-07 RX ADMIN — ATORVASTATIN CALCIUM 20 MILLIGRAM(S): 80 TABLET, FILM COATED ORAL at 21:19

## 2021-04-07 RX ADMIN — Medication 975 MILLIGRAM(S): at 08:10

## 2021-04-07 RX ADMIN — Medication 40 MILLIGRAM(S): at 09:06

## 2021-04-07 RX ADMIN — TAMSULOSIN HYDROCHLORIDE 0.4 MILLIGRAM(S): 0.4 CAPSULE ORAL at 21:19

## 2021-04-07 RX ADMIN — GABAPENTIN 300 MILLIGRAM(S): 400 CAPSULE ORAL at 21:19

## 2021-04-07 RX ADMIN — Medication 975 MILLIGRAM(S): at 11:14

## 2021-04-07 RX ADMIN — Medication 1: at 17:58

## 2021-04-07 RX ADMIN — Medication 650 MILLIGRAM(S): at 19:02

## 2021-04-07 RX ADMIN — Medication 975 MILLIGRAM(S): at 03:27

## 2021-04-07 NOTE — H&P ADULT - ASSESSMENT
Patient is an 83 yo M with a PMHx of CAD s/p CABG, HTN, DM, admission for UTI 2020, whom was BIBA to ED today for ams x 1 day associated with generalized weakness and fever for one day, tmax 100.3 at home and temp was 102 on arrival to ED. Patient had similar presentation in 1/2020 was admitted for UTI and urine culture grew ecoli. TOday in ED pt UA consistent with UTI. Pt given rocephin and IVF. Urine and blood cx sent.   Patient is an 83 yo M with a PMHx of CAD s/p CABG, HTN, DM, admission for UTI 2020, whom was BIBA to ED today for ams x 1 day associated with generalized weakness and fever for one day, tmax 100.3 at home and temp was 102 on arrival to ED. Patient had similar presentation in 1/2020 was admitted for UTI and urine culture grew ecoli. TOday in ED pt UA consistent with UTI. Pt given rocephin and IVF. Urine and blood cx sent. Rapid response called for tachypnea.    Patient is an 83 yo M with a PMHx of CAD s/p CABG, HTN, DM, admission for UTI 2020, whom was BIBA to ED today for ams x 1 day associated with generalized weakness and fever for one day, tmax 100.3 at home and temp was 102 on arrival to ED. Patient had similar presentation in 1/2020 was admitted for UTI and urine culture grew ecoli. TOday in ED pt UA consistent with UTI. Pt given rocephin and IVF. Urine and blood cx sent. Rapid response called for tachypnea.   There is a small renal stone which can be followed as outpt.  INR is elevated and will repeat in am.   Patient is an 85 yo M with a PMHx of AFIB, BPH, CAD s/p CABG, HTN, DM, admission for UTI 2020, whom was BIBA to ED today for ams x 1 day associated with generalized weakness and fever for one day, tmax 100.3 at home and temp was 102 on arrival to ED. Patient had similar presentation in 1/2020 was admitted for UTI and urine culture grew ecoli. TOday in ED pt UA consistent with UTI. Pt given rocephin and IVF. Urine and blood cx sent. Rapid response called for tachypnea.   There is a small renal stone which can be followed as outpt.  INR is elevated secondary to eliquis will repeat in am.  AFIB on ekg will hold eliquid due to gross hematuria. Continue with toprL   Patient is an 83 yo M with a PMHx of AFIB, BPH, CAD s/p CABG, HTN, DM, admission for UTI 2020, whom was BIBA to ED today for ams x 1 day associated with generalized weakness and fever for one day, tmax 100.3 at home and temp was 102 on arrival to ED. Patient had similar presentation in 1/2020 was admitted for UTI and urine culture grew ecoli. TOday in ED pt UA consistent with UTI. Pt given rocephin and IVF. Urine and blood cx sent. Rapid response called for tachypnea.   There is a small renal stone which can be followed as outpt.  INR is elevated secondary to eliquis will repeat in am.  AFIB on ekg will hold eliquid due to gross hematuria. Continue with toprL.  Will insert tomlin for now for I&O monitoring.

## 2021-04-07 NOTE — H&P ADULT - HISTORY OF PRESENT ILLNESS
Patient is an 85 yo M with a PMHx of CAD s/p CABG, HTN, DM, admission for UTI 2020, whom was BIBA to ED today for ams x 1 day associated with generalized weakness and fever for one day, tmax 100.3 at home and temp was 102 on arrival to ED. Patient had similar presentation in 1/2020 was admitted for UTI and urine culture grew ecoli which was tx with rocephin.

## 2021-04-07 NOTE — ED ADULT TRIAGE NOTE - CHIEF COMPLAINT QUOTE
Pt BIBA from home for weakness.  Per EMS "Family stated he is having a change in mental status.  Patient was crawling on his knees.  He has abrasion to both his knees."  Fingerstick 183 in the field.

## 2021-04-07 NOTE — H&P ADULT - NSHPREVIEWOFSYSTEMS_GEN_ALL_CORE
General:	+fever,  chills  Skin: no rash, ulcers  Ophthalmologic: no visual changes  ENMT:	no sore throat  Respiratory and Thorax: no cough, wheeze,  sob  Cardiovascular:	no chest pain, palpitations, dizziness  Gastrointestinal:	no nausea, vomiting, diarrhea, abd pain  Genitourinary:	no dysuria, hematuria  Musculoskeletal:	no joint pains  Neurological:	 no speech disturbance, focal weakness, numbness  Psychiatric:	no depression, anxiety, psychosis  Hematology/Lymphatics:	no anemia  Endocrine:	no polyuria, polydipsia General:	+fever,  chills  Skin: no rash, ulcers  Ophthalmologic: no visual changes  ENMT:	no sore throat  Respiratory and Thorax: no cough, wheeze,  + sob  Cardiovascular:	no chest pain, palpitations, dizziness  Gastrointestinal:	no nausea, vomiting, diarrhea, abd pain  Genitourinary:	no dysuria, hematuria  Musculoskeletal:	no joint pains  Neurological:	 no speech disturbance, focal weakness, numbness  Psychiatric:	no depression, anxiety, psychosis  Hematology/Lymphatics:	no anemia  Endocrine:	no polyuria, polydipsia

## 2021-04-07 NOTE — H&P ADULT - PROBLEM SELECTOR PLAN 8
mild  given mg rider in ed  repeat level in am c/w dutesteride and flomax c/w dutesteride (change to finasteride in hospital) and flomax

## 2021-04-07 NOTE — ED PROVIDER NOTE - PROGRESS NOTE DETAILS
Dr. Ramos - daisha d/w hospitalist service with request for low risk Telemetry admission due to biomarker recheck, will modify service. Reeval bedside, pt rectally febrile with chills.  EKG reveals no acute ST changes.  SpO2  92-94 on 3 L NC, will reposition and repeat CXR reveals congestion so diuresis ordered. No need for NIPPV at this time, will provide diuresis / antipyretics and continue with plan for inpatient care. Plan communicated to inpatient service and ED team members. Dr. Rachel ashton d/w Dr. Foley for dispo.

## 2021-04-07 NOTE — H&P ADULT - PROBLEM SELECTOR PROBLEM 6
Type 2 diabetes mellitus without complication, without long-term current use of insulin Advance care planning

## 2021-04-07 NOTE — H&P ADULT - NSICDXPASTMEDICALHX_GEN_ALL_CORE_FT
PAST MEDICAL HISTORY:  3-vessel CAD     Abnormal cholesterol test     Borderline diabetes     H/O: HTN (hypertension)      PAST MEDICAL HISTORY:  3-vessel CAD     BPH with obstruction/lower urinary tract symptoms     Diabetes mellitus     E-coli UTI     HTN (hypertension)     Hyperlipemia

## 2021-04-07 NOTE — H&P ADULT - NSHPLABSRESULTS_GEN_ALL_CORE
12.4   5.46  )-----------( 99       ( 2021 04:00 )             37.8       04-07    136  |  100  |  18  ----------------------------<  160<H>  3.7   |  24  |  1.3    Ca    8.6      2021 04:00  Mg     1.6     04-07    TPro  6.0  /  Alb  3.9  /  TBili  0.9  /  DBili  x   /  AST  15  /  ALT  12  /  AlkPhos  95  04-07              Urinalysis Basic - ( 2021 05:25 )    Color: Yellow / Appearance: Clear / S.015 / pH: x  Gluc: x / Ketone: Negative  / Bili: Negative / Urobili: 0.2 mg/dL   Blood: x / Protein: Negative mg/dL / Nitrite: Positive   Leuk Esterase: Moderate / RBC: 6-10 /HPF / WBC 10-25 /HPF   Sq Epi: x / Non Sq Epi: Occasional /HPF / Bacteria: Moderate        PT/INR - ( 2021 04:00 )   PT: 19.20 sec;   INR: 1.67 ratio         PTT - ( 2021 04:00 )  PTT:31.2 sec    Lactate Trend  - @ 04:00 Lactate:2.0       CARDIAC MARKERS ( 2021 04:00 )  x     / 0.02 ng/mL / x     / x     / x            POCT Blood Glucose.: 153 mg/dL (2021 03:14)

## 2021-04-07 NOTE — SWALLOW BEDSIDE ASSESSMENT ADULT - SLP PERTINENT HISTORY OF CURRENT PROBLEM
Pt admitted with AMS x1 day, generalized weakness, fever. Rapid response in ED for tachypnea. Pt with recent admission for UTI.

## 2021-04-07 NOTE — H&P ADULT - PROBLEM SELECTOR PLAN 2
c/w statin, toprol, asa # Acute congestive heart failure  -IV Lasix  -monitor daily weight, I & O  -telemetry  -trend cardiac enzyme  -echocardiogram  -monitor electrolyte  -c/w oxygen  -monitor pox

## 2021-04-07 NOTE — ED PROVIDER NOTE - CLINICAL SUMMARY MEDICAL DECISION MAKING FREE TEXT BOX
86yM PMHx CAD s/p CABG, HTN, DM, DLD,  pw weakness and  confusion,   + UTI ,  previous urine cultures  from Jan reviewed - sensitive to ceftriaxone,  CT head  no acute pathology DWAIN  cre 1.3,  (previous 0.9) IVf given 86yM PMHx CAD s/p CABG, HTN, DM, DLD,  pw weakness and  confusion,   + UTI ,  previous urine cultures  from [facility] reviewed - sensitive to ceftriaxone,  CT head  no acute pathology DWAIN  cre 1.3,  (previous 0.9) IVf given    Dr. Ramos - no acute issues on my shift. Images resulted, plan is for inpatient management.

## 2021-04-07 NOTE — H&P ADULT - PROBLEM SELECTOR PROBLEM 7
BPH with obstruction/lower urinary tract symptoms Type 2 diabetes mellitus without complication, without long-term current use of insulin

## 2021-04-07 NOTE — H&P ADULT - PROBLEM SELECTOR PLAN 7
c/w dutesteride and flomax #DM  -ada diet  -monitor BS  -hold Januvia nonformulary  -old metformin for now due to mildly impaired renal function and acute illness  -insulin coverage prn elevated blood sugar

## 2021-04-07 NOTE — H&P ADULT - NSHPPHYSICALEXAM_GEN_ALL_CORE
Vital Signs Last 24 Hrs  T(F): 98.2 (07 Apr 2021 07:40), Max: 102 (07 Apr 2021 03:03)  HR: 53 (07 Apr 2021 07:40) (53 - 67)  BP: 133/61 (07 Apr 2021 07:40) (127/61 - 137/87)  RR: 18 (07 Apr 2021 07:40) (17 - 18)  SpO2: 97% (07 Apr 2021 07:40) (95% - 97%)    PHYSICAL EXAM:      Constitutional: A&Ox4  Respiratory: cta b/l  Cardiovascular: s1 s2 rrr  Gastrointestinal: soft nt  nd + bs no rebound or guarding  Genitourinary: no cva tenderness  Extremities: normal rom, no edema, calf tenderness  Neurological:no focal deficits  Skin: no rash Vital Signs Last 24 Hrs  T(F): 98.2 (07 Apr 2021 07:40), Max: 102 (07 Apr 2021 03:03)  HR: 53 (07 Apr 2021 07:40) (53 - 67)  BP: 133/61 (07 Apr 2021 07:40) (127/61 - 137/87)  RR: 18 (07 Apr 2021 07:40) (17 - 18)  SpO2: 97% (07 Apr 2021 07:40) (95% - 97%)    PHYSICAL EXAM:      Constitutional: alert, oriented to person and place only, restless, delirious   Respiratory: cta b/l  Cardiovascular: s1 s2 rrr  Gastrointestinal: soft nt  nd + bs no rebound or guarding  Genitourinary: no cva tenderness  Extremities: normal rom, + b/l le edema in ankles, no calf tenderness  Neurological:no focal deficits  Skin: no rash

## 2021-04-07 NOTE — ED PROVIDER NOTE - OBJECTIVE STATEMENT
86 year old male past medical history of Coronary Artery Disease, Hypertension, HLD, Diabetes, status post second covid (moderna) vaccine 5 days ago comes to emergency room for weakness and confusion. patient noted by family yesterday to be confused all day and saying the wrong things and tonight having increasing weakness. Pt also noted to have low grade fever of 100.3 at home. Pt was unable to get into bed tonight, than attempted to get into chair and fell into chair. spouse was unable to help him up and called 911.

## 2021-04-07 NOTE — H&P ADULT - PROBLEM SELECTOR PLAN 6
#DM  -ada diet  -monitor BS  -hold Januvia nonformulary  -old metformin for now due to mildly impaired renal function and acute illness  -insulin coverage prn elevated blood sugar full code

## 2021-04-07 NOTE — H&P ADULT - PROBLEM SELECTOR PLAN 9
IVF  monitor renal function-I&O bmp  hold lasix and metformin for now mild  given mg rider in ed  repeat level in am

## 2021-04-07 NOTE — ED PROVIDER NOTE - BIRTH SEX
"Large Joint Aspiration/Injection: R glenohumeral, L glenohumeral  Date/Time: 9/19/2018 1:46 PM  Performed by: Rock Lopez MD  Authorized by: Rock Lopez MD     Consent Done?:  Yes (Verbal)  Indications:  Pain  Procedure site marked: Yes    Timeout: Prior to procedure the correct patient, procedure, and site was verified      Location:  Shoulder  Site:  R glenohumeral and L glenohumeral  Prep: Patient was prepped and draped in usual sterile fashion    Ultrasonic Guidance for needle placement: Yes  Images are saved and documented.  Needle size:  20 G  Approach:  Posterior  Medications:  40 mg triamcinolone acetonide 40 mg/mL; 40 mg triamcinolone acetonide 40 mg/mL  Patient tolerance:  Patient tolerated the procedure well with no immediate complications    Additional Comments: Description of ultrasound utilization for needle guidance:   Ultrasound guidance used for needle localization. Images saved and stored for documentation. The glenohumeral joint was visualized. Dynamic visualization of the 20g x 3.5" needle was continuous throughout the procedure.      "
"Large Joint Aspiration/Injection: R subacromial bursa, L subacromial bursa  Date/Time: 9/19/2018 1:45 PM  Performed by: Rock Lopez MD  Authorized by: Rock Lopez MD     Consent Done?:  Yes (Verbal)  Indications:  Pain  Procedure site marked: Yes    Timeout: Prior to procedure the correct patient, procedure, and site was verified      Location:  Shoulder  Site:  R subacromial bursa and L subacromial bursa  Prep: Patient was prepped and draped in usual sterile fashion    Ultrasonic Guidance for needle placement: Yes  Images are saved and documented.  Needle size:  20 G  Approach:  Lateral  Medications:  40 mg triamcinolone acetonide 40 mg/mL; 40 mg triamcinolone acetonide 40 mg/mL  Patient tolerance:  Patient tolerated the procedure well with no immediate complications    Additional Comments: Description of ultrasound utilization for needle guidance:   Ultrasound guidance used for needle localization. Images saved and stored for documentation. The subacromial / subdeltoid bursa was visualized. Dynamic visualization of the 20g x 1.5" needle was continuous throughout the procedure.        "
Male

## 2021-04-07 NOTE — H&P ADULT - PROBLEM SELECTOR PLAN 3
#HTN  -low sodium diet  -monitor bp  -cont meds from home c/w statin, toprol, asa c/w statin, toprol,   hold asa

## 2021-04-07 NOTE — H&P ADULT - PROBLEM SELECTOR PLAN 1
c/w rocephin  f/u urine and blood cx  IVF c.w rocephin  f/u urine and blood cx c.w rocephin  f/u urine and blood cx  will hold eliquis and asa for now

## 2021-04-07 NOTE — H&P ADULT - ATTENDING COMMENTS
patient seen and examined.    RRT was attended and managed  Discussed with ACP.    # Sepsis secondary to complicated ascending UTI- POA  monitor cultures  continue ceftriaxone  ID consult    # fever- upto 104  RVP, covid neg  tyelnol; cooling blanket    # acute respiratory failure- multifactorail   secondary to pulmonary edema and related to sepsis; patient with fever upto 104   patient received fluids in ER; then was given dose of lasix  monitor    # mild elevation in trop- trend troponin  tele monitoring    # AMS- secondary to metabolic encephalopathy    # on eliquis- A fib?    # failed bedside swallow; NPO  cosnult speech

## 2021-04-08 LAB
A1C WITH ESTIMATED AVERAGE GLUCOSE RESULT: 6.9 % — HIGH (ref 4–5.6)
ALBUMIN SERPL ELPH-MCNC: 3.6 G/DL — SIGNIFICANT CHANGE UP (ref 3.5–5.2)
ALP SERPL-CCNC: 91 U/L — SIGNIFICANT CHANGE UP (ref 30–115)
ALT FLD-CCNC: 17 U/L — SIGNIFICANT CHANGE UP (ref 0–41)
ANION GAP SERPL CALC-SCNC: 11 MMOL/L — SIGNIFICANT CHANGE UP (ref 7–14)
AST SERPL-CCNC: 40 U/L — SIGNIFICANT CHANGE UP (ref 0–41)
BASOPHILS # BLD AUTO: 0.02 K/UL — SIGNIFICANT CHANGE UP (ref 0–0.2)
BASOPHILS NFR BLD AUTO: 0.2 % — SIGNIFICANT CHANGE UP (ref 0–1)
BILIRUB DIRECT SERPL-MCNC: 0.3 MG/DL — HIGH (ref 0–0.2)
BILIRUB INDIRECT FLD-MCNC: 0.4 MG/DL — SIGNIFICANT CHANGE UP (ref 0.2–1.2)
BILIRUB SERPL-MCNC: 0.7 MG/DL — SIGNIFICANT CHANGE UP (ref 0.2–1.2)
BUN SERPL-MCNC: 27 MG/DL — HIGH (ref 10–20)
CALCIUM SERPL-MCNC: 8.6 MG/DL — SIGNIFICANT CHANGE UP (ref 8.5–10.1)
CHLORIDE SERPL-SCNC: 101 MMOL/L — SIGNIFICANT CHANGE UP (ref 98–110)
CO2 SERPL-SCNC: 29 MMOL/L — SIGNIFICANT CHANGE UP (ref 17–32)
CREAT SERPL-MCNC: 1.3 MG/DL — SIGNIFICANT CHANGE UP (ref 0.7–1.5)
EOSINOPHIL # BLD AUTO: 0.01 K/UL — SIGNIFICANT CHANGE UP (ref 0–0.7)
EOSINOPHIL NFR BLD AUTO: 0.1 % — SIGNIFICANT CHANGE UP (ref 0–8)
ESTIMATED AVERAGE GLUCOSE: 151 MG/DL — HIGH (ref 68–114)
GLUCOSE BLDC GLUCOMTR-MCNC: 136 MG/DL — HIGH (ref 70–99)
GLUCOSE BLDC GLUCOMTR-MCNC: 167 MG/DL — HIGH (ref 70–99)
GLUCOSE BLDC GLUCOMTR-MCNC: 186 MG/DL — HIGH (ref 70–99)
GLUCOSE BLDC GLUCOMTR-MCNC: 192 MG/DL — HIGH (ref 70–99)
GLUCOSE SERPL-MCNC: 145 MG/DL — HIGH (ref 70–99)
HCT VFR BLD CALC: 36.9 % — LOW (ref 42–52)
HGB BLD-MCNC: 11.9 G/DL — LOW (ref 14–18)
IMM GRANULOCYTES NFR BLD AUTO: 0.8 % — HIGH (ref 0.1–0.3)
INR BLD: 1.43 RATIO — HIGH (ref 0.65–1.3)
LYMPHOCYTES # BLD AUTO: 0.93 K/UL — LOW (ref 1.2–3.4)
LYMPHOCYTES # BLD AUTO: 8.9 % — LOW (ref 20.5–51.1)
MAGNESIUM SERPL-MCNC: 2 MG/DL — SIGNIFICANT CHANGE UP (ref 1.8–2.4)
MCHC RBC-ENTMCNC: 29.1 PG — SIGNIFICANT CHANGE UP (ref 27–31)
MCHC RBC-ENTMCNC: 32.2 G/DL — SIGNIFICANT CHANGE UP (ref 32–37)
MCV RBC AUTO: 90.2 FL — SIGNIFICANT CHANGE UP (ref 80–94)
MONOCYTES # BLD AUTO: 0.9 K/UL — HIGH (ref 0.1–0.6)
MONOCYTES NFR BLD AUTO: 8.6 % — SIGNIFICANT CHANGE UP (ref 1.7–9.3)
NEUTROPHILS # BLD AUTO: 8.5 K/UL — HIGH (ref 1.4–6.5)
NEUTROPHILS NFR BLD AUTO: 81.4 % — HIGH (ref 42.2–75.2)
NRBC # BLD: 0 /100 WBCS — SIGNIFICANT CHANGE UP (ref 0–0)
PHOSPHATE SERPL-MCNC: 4.6 MG/DL — SIGNIFICANT CHANGE UP (ref 2.1–4.9)
PLATELET # BLD AUTO: 87 K/UL — LOW (ref 130–400)
POTASSIUM SERPL-MCNC: 4.4 MMOL/L — SIGNIFICANT CHANGE UP (ref 3.5–5)
POTASSIUM SERPL-SCNC: 4.4 MMOL/L — SIGNIFICANT CHANGE UP (ref 3.5–5)
PROT SERPL-MCNC: 6.1 G/DL — SIGNIFICANT CHANGE UP (ref 6–8)
PROTHROM AB SERPL-ACNC: 16.5 SEC — HIGH (ref 9.95–12.87)
RBC # BLD: 4.09 M/UL — LOW (ref 4.7–6.1)
RBC # FLD: 14.6 % — HIGH (ref 11.5–14.5)
SODIUM SERPL-SCNC: 141 MMOL/L — SIGNIFICANT CHANGE UP (ref 135–146)
WBC # BLD: 10.44 K/UL — SIGNIFICANT CHANGE UP (ref 4.8–10.8)
WBC # FLD AUTO: 10.44 K/UL — SIGNIFICANT CHANGE UP (ref 4.8–10.8)

## 2021-04-08 PROCEDURE — 99233 SBSQ HOSP IP/OBS HIGH 50: CPT

## 2021-04-08 RX ADMIN — CEFTRIAXONE 100 MILLIGRAM(S): 500 INJECTION, POWDER, FOR SOLUTION INTRAMUSCULAR; INTRAVENOUS at 05:42

## 2021-04-08 RX ADMIN — TAMSULOSIN HYDROCHLORIDE 0.4 MILLIGRAM(S): 0.4 CAPSULE ORAL at 21:05

## 2021-04-08 RX ADMIN — FINASTERIDE 5 MILLIGRAM(S): 5 TABLET, FILM COATED ORAL at 11:03

## 2021-04-08 RX ADMIN — Medication 40 MILLIGRAM(S): at 05:28

## 2021-04-08 RX ADMIN — Medication 1: at 11:54

## 2021-04-08 RX ADMIN — ATORVASTATIN CALCIUM 20 MILLIGRAM(S): 80 TABLET, FILM COATED ORAL at 21:05

## 2021-04-08 RX ADMIN — GABAPENTIN 300 MILLIGRAM(S): 400 CAPSULE ORAL at 05:28

## 2021-04-08 RX ADMIN — GABAPENTIN 300 MILLIGRAM(S): 400 CAPSULE ORAL at 21:05

## 2021-04-08 RX ADMIN — GABAPENTIN 300 MILLIGRAM(S): 400 CAPSULE ORAL at 13:18

## 2021-04-08 RX ADMIN — Medication 1: at 17:17

## 2021-04-08 NOTE — PROGRESS NOTE ADULT - ASSESSMENT
Patient is an 83 yo M with a PMHx of AFIB, BPH, CAD s/p CABG, HTN, DM, admission for UTI 2020, whom was BIBA to ED for ams x 1 day associated with generalized weakness and fever   In Ed patient had fever upto 104. he was found to have ascending UTI and was admitted    # Sepsis secondary to complicated ascending UTI- POA  monitor cultures  continue ceftriaxone  ID consult    # fever- upto 104 secondary to above- improved  RVP, covid neg    # acute respiratory failure- with tachypnea secondary to high grade fever; resolved  acute CHF- ruled out    # mild elevation in trop- type 2 resolved    # AMS- secondary to metabolic encephalopathy- improved    # A fib-  unsure; patient unsure why he is on eliquis; It was reported patient had hematuria in ER and hence it is on hold; currently no c/o hematuria; hg stable; if continues to be stable plan for restarting eliquis    # thrombocytopenia- related to sepsis; monitor    # failed bedside swallow; resolved  patient was re evaluated and speech recommended regular diet    # CAD s/p CABG  Plan: c/w statin, toprol,   hold asa.     #HTN (hypertension), benign.  Plan: #HTN  -low sodium diet  -monitor bp  -cont meds from home.     #DM  -ada diet  -monitor BS  -hold Januvia,metformin  -insulin coverage prn elevated blood sugar.     # BPH  #hypomagnesemia- repalced       Patient is an 83 yo M with a PMHx of AFIB, BPH, CAD s/p CABG, HTN, DM, admission for UTI 2020, whom was BIBA to ED for ams x 1 day associated with generalized weakness and fever   In Ed patient had fever upto 104. he was found to have ascending UTI and was admitted    # Sepsis secondary to complicated ascending UTI- POA  monitor cultures  urine culture growing E coli  blood culture- 2/2- NGTD  continue ceftriaxone  ID following    # fever- upto 104 secondary to above- improved  RVP, covid neg    # acute respiratory failure- with tachypnea secondary to high grade fever; resolved  acute CHF- ruled out    # mild elevation in trop- type 2 resolved    # AMS- secondary to metabolic encephalopathy- improved    # A fib-  unsure; patient unsure why he is on eliquis; It was reported patient had hematuria in ER and hence it is on hold; currently no c/o hematuria; hg stable; if continues to be stable plan for restarting eliquis    # thrombocytopenia- related to sepsis; monitor    # failed bedside swallow; resolved  patient was re evaluated and speech recommended regular diet    # CAD s/p CABG  Plan: c/w statin, toprol,   hold asa.     #HTN (hypertension), benign.  Plan: #HTN  -low sodium diet  -monitor bp  -cont meds from home.     #DM  -ada diet  -monitor BS  -hold Januvia,metformin  -insulin coverage prn elevated blood sugar.     # BPH  #hypomagnesemia- repalced      Patient understands his medical problems and plan of care. he does not want to medical team to contact his family. I informed patient to let me know if the family to be updated

## 2021-04-08 NOTE — CONSULT NOTE ADULT - ASSESSMENT
Patient is a 86y old  Male with a PMHx of CAD s/p CABG, HTN, DM, admission for UTI 2020, whom was BIBA to ER for evaluation of AMS, associated with generalized weakness and fever for one day, tmax 100.3 at home. On admission, he found to have high fever, tachycardia and positive Urine analysis. The CT head, CXR , COVID 19 and renal US is negative. He has started on Ceftriaxone and the ID consult requested to assist with further evaluation and antibiotic management.     # Sepsis ( Fever + Leukocytosis)  # UTI  # Metabolic encephalopathy- resolved    Would recommend:    1. Follow up Urine and Blood cultures   2. Monitor Temp. and c/w supportive care  3. Continue Ceftriaxone based on previous culture until current culture is finalized  4. Monitor kidney function  and IVF    will follow the patient with you and make further recommendation based on the clinical course and Lab results  Thank you for the opportunity to participate in Mr. YBARRA's care      Attending Attestation:    Spent more than 65 minutes on total encounter, more than 50 % of the visit was spent counseling and/or coordinating care by the Attending physician.

## 2021-04-08 NOTE — PHYSICAL THERAPY INITIAL EVALUATION ADULT - GAIT DEVIATIONS NOTED, PT EVAL
stooped posture, dec heel strike/pushoff , dec NOREEN/decreased lois/decreased step length/decreased weight-shifting ability

## 2021-04-08 NOTE — CONSULT NOTE ADULT - SUBJECTIVE AND OBJECTIVE BOX
Patient is a 86y old  Male with a PMHx of CAD s/p CABG, HTN, DM, admission for UTI , whom was BIBA to ER for evaluation of AMS, associated with generalized weakness and fever for one day, tmax 100.3 at home. On admission, he found to have high fever, tachycardia and positive Urine analysis. The CT head, CXR , COVID 19 and renal US is negative. He has started on Ceftriaxone and the ID consult requested to assist with further evaluation and antibiotic management.       REVIEW OF SYSTEMS: Total of twelve systems have been reviewed with patient and found to be negative unless mentioned in HPI      PAST MEDICAL & SURGICAL HISTORY:  3-vessel CAD  HTN (hypertension)  Diabetes mellitus  Hyperlipemia  BPH with obstruction/lower urinary tract symptoms  E-coli UTI  S/P CABG x 3  History of cataract surgery, left eye, Sep 2019      SOCIAL HISTORY  Alcohol: Does not drink  Tobacco: Does not smoke  Illicit substance use: None      FAMILY HISTORY: Non contributory to the present illness      ALLERGIES: No Known Allergies      Vital Signs Last 24 Hrs  T(C): 36.9 (2021 05:17), Max: 40.4 (2021 11:10)  T(F): 98.4 (2021 05:17), Max: 104.7 (2021 11:10)  HR: 80 (2021 05:17) (68 - 84)  BP: 100/63 (2021 05:17) (100/63 - 163/68)  BP(mean): --  RR: 20 (2021 05:17) (20 - 34)  SpO2: 92% (2021 09:09) (92% - 97%)      PHYSICAL EXAM:  GENERAL: Not in distress   CHEST/LUNG: Not using accessory muscles   HEART: s1 and s2 present  ABDOMEN:  Nontender and  Nondistended  EXTREMITIES: No pedal  edema  CNS: Awake and Alert      LABS:                        11.9   10.44 )-----------( 87       ( 2021 06:44 )             36.9         04-08    141  |  101  |  27<H>  ----------------------------<  145<H>  4.4   |  29  |  1.3    Ca    8.6      2021 06:44  Phos  4.6     04-08  Mg     2.0     -08    TPro  6.1  /  Alb  3.6  /  TBili  0.7  /  DBili  0.3<H>  /  AST  40  /  ALT  17  /  AlkPhos  91      PT/INR - ( 2021 06:44 )   PT: 16.50 sec;   INR: 1.43 ratio     PTT - ( 2021 04:00 )  PTT:31.2 sec      CAPILLARY BLOOD GLUCOSE  POCT Blood Glucose.: 136 mg/dL (2021 07:37)  POCT Blood Glucose.: 185 mg/dL (2021 21:15)  POCT Blood Glucose.: 185 mg/dL (2021 20:02)  POCT Blood Glucose.: 160 mg/dL (2021 17:17)  POCT Blood Glucose.: 167 mg/dL (2021 14:40)        Urinalysis Basic - ( 2021 05:25 )  Color: Yellow / Appearance: Clear / S.015 / pH: x  Gluc: x / Ketone: Negative  / Bili: Negative / Urobili: 0.2 mg/dL   Blood: x / Protein: Negative mg/dL / Nitrite: Positive   Leuk Esterase: Moderate / RBC: 6-10 /HPF / WBC 10-25 /HPF   Sq Epi: x / Non Sq Epi: Occasional /HPF / Bacteria: Moderate        MEDICATIONS  (STANDING):  atorvastatin 20 milliGRAM(s) Oral at bedtime  cefTRIAXone   IVPB 1000 milliGRAM(s) IV Intermittent every 24 hours  finasteride 5 milliGRAM(s) Oral daily  gabapentin 300 milliGRAM(s) Oral three times a day  glucagon  Injectable 1 milliGRAM(s) IntraMuscular once  insulin lispro (ADMELOG) corrective regimen sliding scale   SubCutaneous three times a day before meals  insulin lispro (ADMELOG) corrective regimen sliding scale   SubCutaneous at bedtime  metoprolol succinate ER 25 milliGRAM(s) Oral daily  tamsulosin 0.4 milliGRAM(s) Oral at bedtime    MEDICATIONS  (PRN):  acetaminophen   Tablet .. 650 milliGRAM(s) Oral every 4 hours PRN Temp greater or equal to 38C (100.4F), Mild Pain (1 - 3)  ibuprofen  Tablet. 600 milliGRAM(s) Oral every 8 hours PRN Temp greater or equal to 38C (100.4F)        RADIOLOGY & ADDITIONAL TESTS:    < from: Xray Chest 1 View AP/PA (21 @ 09:10) >  No radiographic evidence of acute cardiopulmonary disease.    < from: CT Abdomen and Pelvis w/ IV Cont (21 @ 06:36) >  1. Bilateral mild ureteral enhancement with bladder wall thickening. Differential considerations favor cystitis with ascending urinary tract infection; no hydroureteronephrosis  2. Nonobstructing right renal lower pole 3 mm stone, left renal cortical calcification consistent with sequelae of prior bouts of inflammation      < from: CT Head No Cont (21 @ 04:59) >  No evidence of acute intracranial pathology. Stable exam since 2020.    < from: Xray Chest 1 View-PORTABLE IMMEDIATE (21 @ 04:13) >  No radiographic evidence of acute cardiopulmonary disease.        MICROBIOLOGY DATA:    Respiratory Viral Panel with COVID-19 by JT (21 @ 03:15)   Rapid RVP Result: [facility]   SARS-CoV-2: [facility]

## 2021-04-08 NOTE — PROGRESS NOTE ADULT - SUBJECTIVE AND OBJECTIVE BOX
HPI  Patient is a 86y old Male who presents with a chief complaint of   Currently admitted to medicine with the primary diagnosis of UTI (urinary tract infection)       Today is hospital day 1d.     INTERVAL HPI / OVERNIGHT EVENTS:  Patient was examined and seen at bedside.   patient feeling better  no new complaints      PAST MEDICAL & SURGICAL HISTORY  3-vessel CAD    HTN (hypertension)    Diabetes mellitus    Hyperlipemia    BPH with obstruction/lower urinary tract symptoms    E-coli UTI    S/P CABG x 3    History of cataract surgery  left eye, Sep 2019      ALLERGIES  No Known Allergies    MEDICATIONS  STANDING MEDICATIONS  atorvastatin 20 milliGRAM(s) Oral at bedtime  cefTRIAXone   IVPB 1000 milliGRAM(s) IV Intermittent every 24 hours  dextrose 40% Gel 15 Gram(s) Oral once  dextrose 5%. 1000 milliLiter(s) IV Continuous <Continuous>  dextrose 5%. 1000 milliLiter(s) IV Continuous <Continuous>  dextrose 50% Injectable 25 Gram(s) IV Push once  dextrose 50% Injectable 12.5 Gram(s) IV Push once  dextrose 50% Injectable 25 Gram(s) IV Push once  finasteride 5 milliGRAM(s) Oral daily  gabapentin 300 milliGRAM(s) Oral three times a day  glucagon  Injectable 1 milliGRAM(s) IntraMuscular once  insulin lispro (ADMELOG) corrective regimen sliding scale   SubCutaneous three times a day before meals  insulin lispro (ADMELOG) corrective regimen sliding scale   SubCutaneous at bedtime  metoprolol succinate ER 25 milliGRAM(s) Oral daily  tamsulosin 0.4 milliGRAM(s) Oral at bedtime    PRN MEDICATIONS  acetaminophen   Tablet .. 650 milliGRAM(s) Oral every 4 hours PRN  ibuprofen  Tablet. 600 milliGRAM(s) Oral every 8 hours PRN    VITALS:  T(F): 96.1  HR: 81  BP: 118/59  RR: 20  SpO2: 92%    PHYSICAL EXAM  GEN: NAD, Resting comfortably in chair  PULM: Clear to auscultation bilaterally, No wheezes  CVS: Regular rate and rhythm, S1-S2, no murmurs  ABD: Soft, non-tender, non-distended, no guarding  EXT: No edema  NEURO: A&Ox3, no focal deficits    LABS                        11.9   10.44 )-----------( 87       ( 2021 06:44 )             36.9     04-08    141  |  101  |  27<H>  ----------------------------<  145<H>  4.4   |  29  |  1.3    Ca    8.6      2021 06:44  Phos  4.6     04-08  Mg     2.0     04-08    TPro  6.1  /  Alb  3.6  /  TBili  0.7  /  DBili  0.3<H>  /  AST  40  /  ALT  17  /  AlkPhos  91  04-08    PT/INR - ( 2021 06:44 )   PT: 16.50 sec;   INR: 1.43 ratio         PTT - ( 2021 04:00 )  PTT:31.2 sec  Urinalysis Basic - ( 2021 05:25 )    Color: Yellow / Appearance: Clear / S.015 / pH: x  Gluc: x / Ketone: Negative  / Bili: Negative / Urobili: 0.2 mg/dL   Blood: x / Protein: Negative mg/dL / Nitrite: Positive   Leuk Esterase: Moderate / RBC: 6-10 /HPF / WBC 10-25 /HPF   Sq Epi: x / Non Sq Epi: Occasional /HPF / Bacteria: Moderate        Creatine Kinase, Serum: 912 U/L *H* (21 @ 21:45)  Troponin T, Serum: 0.01 ng/mL (21 @ 21:45)      Culture - Urine (collected 2021 05:25)  Source: .Urine Clean Catch (Midstream)  Preliminary Report (2021 12:52):    >100,000 CFU/ml Escherichia coli    Culture - Blood (collected 2021 04:20)  Source: .Blood Blood  Preliminary Report (2021 14:03):    No growth to date.    Culture - Blood (collected 2021 04:20)  Source: .Blood Blood  Preliminary Report (2021 14:03):    No growth to date.      CARDIAC MARKERS ( 2021 21:45 )  x     / 0.01 ng/mL / 912 U/L / x     / 5.7 ng/mL  CARDIAC MARKERS ( 2021 15:52 )  x     / 0.02 ng/mL / 675 U/L / x     / 4.7 ng/mL  CARDIAC MARKERS ( 2021 04:00 )  x     / 0.02 ng/mL / x     / x     / x          RADIOLOGY

## 2021-04-08 NOTE — PHYSICAL THERAPY INITIAL EVALUATION ADULT - GENERAL OBSERVATIONS, REHAB EVAL
09:43-10;08 Chart reviewed. Pt encountered semireclined in bed, may be seen by Physical Therapist as confirmed with Nurse. Patient denied pain at rest and ready to get up now; +tele; +dressing (B) knees

## 2021-04-09 ENCOUNTER — TRANSCRIPTION ENCOUNTER (OUTPATIENT)
Age: 86
End: 2021-04-09

## 2021-04-09 VITALS — SYSTOLIC BLOOD PRESSURE: 129 MMHG | HEART RATE: 92 BPM | DIASTOLIC BLOOD PRESSURE: 80 MMHG | TEMPERATURE: 97 F

## 2021-04-09 LAB
-  AMIKACIN: SIGNIFICANT CHANGE UP
-  AMOXICILLIN/CLAVULANIC ACID: SIGNIFICANT CHANGE UP
-  AMPICILLIN/SULBACTAM: SIGNIFICANT CHANGE UP
-  AMPICILLIN: SIGNIFICANT CHANGE UP
-  AZTREONAM: SIGNIFICANT CHANGE UP
-  CEFAZOLIN: SIGNIFICANT CHANGE UP
-  CEFEPIME: SIGNIFICANT CHANGE UP
-  CEFOXITIN: SIGNIFICANT CHANGE UP
-  CEFTRIAXONE: SIGNIFICANT CHANGE UP
-  CIPROFLOXACIN: SIGNIFICANT CHANGE UP
-  ERTAPENEM: SIGNIFICANT CHANGE UP
-  GENTAMICIN: SIGNIFICANT CHANGE UP
-  IMIPENEM: SIGNIFICANT CHANGE UP
-  LEVOFLOXACIN: SIGNIFICANT CHANGE UP
-  MEROPENEM: SIGNIFICANT CHANGE UP
-  NITROFURANTOIN: SIGNIFICANT CHANGE UP
-  PIPERACILLIN/TAZOBACTAM: SIGNIFICANT CHANGE UP
-  TIGECYCLINE: SIGNIFICANT CHANGE UP
-  TOBRAMYCIN: SIGNIFICANT CHANGE UP
-  TRIMETHOPRIM/SULFAMETHOXAZOLE: SIGNIFICANT CHANGE UP
ANION GAP SERPL CALC-SCNC: 10 MMOL/L — SIGNIFICANT CHANGE UP (ref 7–14)
BASOPHILS # BLD AUTO: 0.02 K/UL — SIGNIFICANT CHANGE UP (ref 0–0.2)
BASOPHILS NFR BLD AUTO: 0.2 % — SIGNIFICANT CHANGE UP (ref 0–1)
BUN SERPL-MCNC: 34 MG/DL — HIGH (ref 10–20)
CALCIUM SERPL-MCNC: 8.3 MG/DL — LOW (ref 8.5–10.1)
CHLORIDE SERPL-SCNC: 103 MMOL/L — SIGNIFICANT CHANGE UP (ref 98–110)
CO2 SERPL-SCNC: 28 MMOL/L — SIGNIFICANT CHANGE UP (ref 17–32)
COVID-19 SPIKE DOMAIN AB INTERP: POSITIVE
COVID-19 SPIKE DOMAIN ANTIBODY RESULT: >250 U/ML — HIGH
CREAT SERPL-MCNC: 1.3 MG/DL — SIGNIFICANT CHANGE UP (ref 0.7–1.5)
CULTURE RESULTS: SIGNIFICANT CHANGE UP
EOSINOPHIL # BLD AUTO: 0.3 K/UL — SIGNIFICANT CHANGE UP (ref 0–0.7)
EOSINOPHIL NFR BLD AUTO: 3.6 % — SIGNIFICANT CHANGE UP (ref 0–8)
GLUCOSE BLDC GLUCOMTR-MCNC: 146 MG/DL — HIGH (ref 70–99)
GLUCOSE BLDC GLUCOMTR-MCNC: 182 MG/DL — HIGH (ref 70–99)
GLUCOSE BLDC GLUCOMTR-MCNC: 222 MG/DL — HIGH (ref 70–99)
GLUCOSE SERPL-MCNC: 151 MG/DL — HIGH (ref 70–99)
HCT VFR BLD CALC: 36 % — LOW (ref 42–52)
HGB BLD-MCNC: 11.8 G/DL — LOW (ref 14–18)
IMM GRANULOCYTES NFR BLD AUTO: 0.5 % — HIGH (ref 0.1–0.3)
LYMPHOCYTES # BLD AUTO: 1.63 K/UL — SIGNIFICANT CHANGE UP (ref 1.2–3.4)
LYMPHOCYTES # BLD AUTO: 19.8 % — LOW (ref 20.5–51.1)
MCHC RBC-ENTMCNC: 29.1 PG — SIGNIFICANT CHANGE UP (ref 27–31)
MCHC RBC-ENTMCNC: 32.8 G/DL — SIGNIFICANT CHANGE UP (ref 32–37)
MCV RBC AUTO: 88.7 FL — SIGNIFICANT CHANGE UP (ref 80–94)
METHOD TYPE: SIGNIFICANT CHANGE UP
MONOCYTES # BLD AUTO: 0.88 K/UL — HIGH (ref 0.1–0.6)
MONOCYTES NFR BLD AUTO: 10.7 % — HIGH (ref 1.7–9.3)
NEUTROPHILS # BLD AUTO: 5.37 K/UL — SIGNIFICANT CHANGE UP (ref 1.4–6.5)
NEUTROPHILS NFR BLD AUTO: 65.2 % — SIGNIFICANT CHANGE UP (ref 42.2–75.2)
NRBC # BLD: 0 /100 WBCS — SIGNIFICANT CHANGE UP (ref 0–0)
ORGANISM # SPEC MICROSCOPIC CNT: SIGNIFICANT CHANGE UP
ORGANISM # SPEC MICROSCOPIC CNT: SIGNIFICANT CHANGE UP
PLATELET # BLD AUTO: 104 K/UL — LOW (ref 130–400)
POTASSIUM SERPL-MCNC: 3.7 MMOL/L — SIGNIFICANT CHANGE UP (ref 3.5–5)
POTASSIUM SERPL-SCNC: 3.7 MMOL/L — SIGNIFICANT CHANGE UP (ref 3.5–5)
RBC # BLD: 4.06 M/UL — LOW (ref 4.7–6.1)
RBC # FLD: 14.4 % — SIGNIFICANT CHANGE UP (ref 11.5–14.5)
SARS-COV-2 IGG+IGM SERPL QL IA: >250 U/ML — HIGH
SARS-COV-2 IGG+IGM SERPL QL IA: POSITIVE
SODIUM SERPL-SCNC: 141 MMOL/L — SIGNIFICANT CHANGE UP (ref 135–146)
SPECIMEN SOURCE: SIGNIFICANT CHANGE UP
WBC # BLD: 8.24 K/UL — SIGNIFICANT CHANGE UP (ref 4.8–10.8)
WBC # FLD AUTO: 8.24 K/UL — SIGNIFICANT CHANGE UP (ref 4.8–10.8)

## 2021-04-09 PROCEDURE — 99239 HOSP IP/OBS DSCHRG MGMT >30: CPT

## 2021-04-09 RX ORDER — ASPIRIN/CALCIUM CARB/MAGNESIUM 324 MG
1 TABLET ORAL
Qty: 0 | Refills: 0 | DISCHARGE

## 2021-04-09 RX ORDER — CEFPODOXIME PROXETIL 100 MG
1 TABLET ORAL
Qty: 14 | Refills: 0
Start: 2021-04-09 | End: 2021-04-15

## 2021-04-09 RX ORDER — APIXABAN 2.5 MG/1
2.5 TABLET, FILM COATED ORAL
Refills: 0 | Status: DISCONTINUED | OUTPATIENT
Start: 2021-04-09 | End: 2021-04-09

## 2021-04-09 RX ADMIN — CEFTRIAXONE 100 MILLIGRAM(S): 500 INJECTION, POWDER, FOR SOLUTION INTRAMUSCULAR; INTRAVENOUS at 05:07

## 2021-04-09 RX ADMIN — GABAPENTIN 300 MILLIGRAM(S): 400 CAPSULE ORAL at 15:20

## 2021-04-09 RX ADMIN — APIXABAN 2.5 MILLIGRAM(S): 2.5 TABLET, FILM COATED ORAL at 13:19

## 2021-04-09 RX ADMIN — Medication 25 MILLIGRAM(S): at 05:07

## 2021-04-09 RX ADMIN — Medication 2: at 11:57

## 2021-04-09 RX ADMIN — FINASTERIDE 5 MILLIGRAM(S): 5 TABLET, FILM COATED ORAL at 11:14

## 2021-04-09 RX ADMIN — GABAPENTIN 300 MILLIGRAM(S): 400 CAPSULE ORAL at 05:07

## 2021-04-09 NOTE — DISCHARGE NOTE PROVIDER - NSDCCPCAREPLAN_GEN_ALL_CORE_FT
PRINCIPAL DISCHARGE DIAGNOSIS  Diagnosis: UTI (urinary tract infection)  Assessment and Plan of Treatment: Sepsis secondary to complicated ascending UTI  urine culture growing E coli; sensitive to cephalosporins  blood culture- showingno growth so far  You were treated with 3 days of IV antibiotics ceftriaxone. Infectious disease doctor was following and recommended continuing antibiotics to finish toal 10 day course  Continue  vantin 200 mg BID for 7 more days         SECONDARY DISCHARGE DIAGNOSES  Diagnosis: Weakness  Assessment and Plan of Treatment:     Diagnosis: Chronic atrial fibrillation  Assessment and Plan of Treatment: You have chronic atrila fibrillation. continue your home medicaiton including eliquis. you need to follow up with cardiologist

## 2021-04-09 NOTE — DISCHARGE NOTE PROVIDER - CARE PROVIDER_API CALL
Caesar Fernandez  INFECTIOUS DISEASE  [address] [place] [id number]  Phone: [phone]  Fax: [phone]  Follow Up Time: 1 week    Jp Wei)  Cardiovascular Disease; Internal Medicine; Interventional Cardiology  [address] [place] [address], [facility] [address]  [place] [address]  Phone: [phone]  Fax: [phone]  Follow Up Time: 2 weeks

## 2021-04-09 NOTE — PROGRESS NOTE ADULT - SUBJECTIVE AND OBJECTIVE BOX
HPI  Patient is a 86y old Male who presents with a chief complaint of   Currently admitted to medicine with the primary diagnosis of UTI (urinary tract infection)       Today is hospital day 2d.     INTERVAL HPI / OVERNIGHT EVENTS:  Patient was examined and seen at bedside.   feels well; wants to go home  denies any complaints        PAST MEDICAL & SURGICAL HISTORY  3-vessel CAD    HTN (hypertension)    Diabetes mellitus    Hyperlipemia    BPH with obstruction/lower urinary tract symptoms    E-coli UTI    S/P CABG x 3    History of cataract surgery  left eye, Sep 2019      ALLERGIES  No Known Allergies    MEDICATIONS  STANDING MEDICATIONS  apixaban 2.5 milliGRAM(s) Oral two times a day  atorvastatin 20 milliGRAM(s) Oral at bedtime  cefTRIAXone   IVPB 1000 milliGRAM(s) IV Intermittent every 24 hours  dextrose 40% Gel 15 Gram(s) Oral once  dextrose 5%. 1000 milliLiter(s) IV Continuous <Continuous>  dextrose 5%. 1000 milliLiter(s) IV Continuous <Continuous>  dextrose 50% Injectable 25 Gram(s) IV Push once  dextrose 50% Injectable 12.5 Gram(s) IV Push once  dextrose 50% Injectable 25 Gram(s) IV Push once  finasteride 5 milliGRAM(s) Oral daily  gabapentin 300 milliGRAM(s) Oral three times a day  glucagon  Injectable 1 milliGRAM(s) IntraMuscular once  insulin lispro (ADMELOG) corrective regimen sliding scale   SubCutaneous three times a day before meals  insulin lispro (ADMELOG) corrective regimen sliding scale   SubCutaneous at bedtime  metoprolol succinate ER 25 milliGRAM(s) Oral daily  tamsulosin 0.4 milliGRAM(s) Oral at bedtime    PRN MEDICATIONS  acetaminophen   Tablet .. 650 milliGRAM(s) Oral every 4 hours PRN  ibuprofen  Tablet. 600 milliGRAM(s) Oral every 8 hours PRN    VITALS:  T(F): 96.4  HR: 66  BP: 125/64  RR: 20  SpO2: --    PHYSICAL EXAM  GEN: NAD, Resting comfortably  PULM: Clear to auscultation bilaterally, No wheezes  CVS: irregular, S1-S2, no murmurs  ABD: Soft, non-tender, non-distended, no guarding  EXT: No edema  NEURO: A&Ox3, no focal deficits    LABS                        11.8   8.24  )-----------( 104      ( 09 Apr 2021 06:40 )             36.0     04-09    141  |  103  |  34<H>  ----------------------------<  151<H>  3.7   |  28  |  1.3    Ca    8.3<L>      09 Apr 2021 06:40  Phos  4.6     04-08  Mg     2.0     04-08    TPro  6.1  /  Alb  3.6  /  TBili  0.7  /  DBili  0.3<H>  /  AST  40  /  ALT  17  /  AlkPhos  91  04-08    PT/INR - ( 08 Apr 2021 06:44 )   PT: 16.50 sec;   INR: 1.43 ratio                   Culture - Urine (collected 07 Apr 2021 05:25)  Source: .Urine Clean Catch (Midstream)  Final Report (09 Apr 2021 08:14):    >100,000 CFU/ml Escherichia coli  Organism: Escherichia coli (09 Apr 2021 08:14)  Organism: Escherichia coli (09 Apr 2021 08:14)    Culture - Blood (collected 07 Apr 2021 04:20)  Source: .Blood Blood  Preliminary Report (08 Apr 2021 14:03):    No growth to date.    Culture - Blood (collected 07 Apr 2021 04:20)  Source: .Blood Blood  Preliminary Report (08 Apr 2021 14:03):    No growth to date.      CARDIAC MARKERS ( 07 Apr 2021 21:45 )  x     / 0.01 ng/mL / 912 U/L / x     / 5.7 ng/mL  CARDIAC MARKERS ( 07 Apr 2021 15:52 )  x     / 0.02 ng/mL / 675 U/L / x     / 4.7 ng/mL      RADIOLOGY

## 2021-04-09 NOTE — DISCHARGE NOTE PROVIDER - HOSPITAL COURSE
Patient is an 83 yo M with a PMHx of AFIB, BPH, CAD s/p CABG, HTN, DM, admission for UTI 2020, whom was BIBA to ED for ams x 1 day associated with generalized weakness and fever   In Ed patient had fever upto 104. he was found to have ascending UTI and was admitted    # Sepsis secondary to complicated ascending UTI- POA  urine culture growing E coli; sn to cephalosporins  blood culture- 2/2- NGTD  continue ceftriaxone D3  ID following  - if sensitive to ceftriaxone -- can plan total 10 day course with vantin 200 mg BID     # fever-resolved    # acute respiratory failure- with tachypnea secondary to high grade fever; resolved  acute CHF- ruled out    # mild elevation in trop- type 2 resolved    # AMS- secondary to metabolic encephalopathy- improved    # chronic A fib-discussed with cardiology office and patient has documented A fib  restart eliquis. patient has no hematuria and hg stable    # thrombocytopenia- related to sepsis; - improving    # failed bedside swallow; resolved  patient was re evaluated and speech recommended regular diet    # CAD s/p CABG  Plan: c/w statin, toprol,   restart asa patient take 3 times a week    #HTN (hypertension),  -low sodium diet  -monitor bp  -cont meds from home.     #DM  -ada diet  -monitor BS  -hold Januvia,metformin  -insulin coverage prn elevated blood sugar.     # BPH  #hypomagnesemia- replaced Patient is an 83 yo M with a PMHx of AFIB, BPH, CAD s/p CABG, HTN, DM, admission for UTI 2020, whom was BIBA to ED for ams x 1 day associated with generalized weakness and fever   In Ed patient had fever upto 104. he was found to have ascending UTI and was admitted    # Sepsis secondary to complicated ascending UTI- POA  urine culture growing E coli; sn to cephalosporins  blood culture- 2/2- NGTD  patient treated with ceftraixone for 3 days. ID was following  recommended total 10 day course with vantin 200 mg BID     # fever-resolved    # acute respiratory failure- with tachypnea secondary to high grade fever; resolved  acute CHF- ruled out    # mild elevation in trop- type 2 resolved    # AMS- secondary to metabolic encephalopathy- improved    # chronic A fib-discussed with cardiology office and patient has documented A fib  restart eliquis. patient has no hematuria and hg stable    # thrombocytopenia- related to sepsis; - improving    # failed bedside swallow; resolved  patient was re evaluated and speech recommended regular diet    # CAD s/p CABG  Plan: c/w statin, toprol,   restart asa patient take 3 times a week    #HTN (hypertension),  -low sodium diet  -monitor bp  -cont meds from home.     #DM  -ada diet  -monitor BS  -hold Januvia,metformin  -insulin coverage prn elevated blood sugar.     # BPH  #hypomagnesemia- replaced Patient is an 85 yo M with a PMHx of AFIB, BPH, CAD s/p CABG, HTN, DM, admission for UTI 2020, whom was BIBA to ED for ams x 1 day associated with generalized weakness and fever   In Ed patient had fever upto 104. he was found to have ascending UTI and was admitted    # Sepsis secondary to complicated ascending UTI- POA  urine culture growing E coli; sn to cephalosporins  blood culture- 2/2- NGTD  patient treated with ceftraixone for 3 days. ID was following  recommended total 10 day course with vantin 200 mg BID     # fever-resolved    # acute respiratory failure- with tachypnea secondary to high grade fever; resolved  acute CHF- ruled out    # mild elevation in trop- type 2 resolved    # AMS- secondary to metabolic encephalopathy- improved    # chronic A fib-discussed with cardiology office and patient has documented A fib  restart eliquis. patient has no hematuria and hg stable    # thrombocytopenia- related to sepsis; - improving    # failed bedside swallow; resolved  patient was re evaluated and speech recommended regular diet    # CAD s/p CABG  Plan: c/w statin, toprol,   restart asa patient take 3 times a week    #HTN (hypertension),  -low sodium diet  -monitor bp  -cont meds from home.     #DM  -ada diet  -monitor BS  -hold Januvia,metformin  -insulin coverage prn elevated blood sugar.     # BPH  #hypomagnesemia- replaced

## 2021-04-09 NOTE — PROGRESS NOTE ADULT - ASSESSMENT
Patient is a 86y old  Male with a PMHx of CAD s/p CABG, HTN, DM, admission for UTI 2020, whom was BIBA to ER for evaluation of AMS, associated with generalized weakness and fever for one day, tmax 100.3 at home. On admission, he found to have high fever, tachycardia and positive Urine analysis. The CT head, CXR , COVID 19 and renal US is negative. He has started on Ceftriaxone and the ID consult requested to assist with further evaluation and antibiotic management.     # Sepsis ( Fever + Leukocytosis)  # UTI  - BLood Cx 4/7 NG   - Urine Cx 4/7 E coli   # Metabolic encephalopathy- resolved  #DWAIN    Recommendations  - continue ceftriaxone 1g daily  - follow-up susceptibilities     This is a preliminary incomplete pended note, all final recommendations to follow after interview and examination of the patient.    Please call or message on [employer] Teams if with any questions.  Spectra [phone]   Patient is a 86y old  Male with a PMHx of CAD s/p CABG, HTN, DM, admission for UTI 2020, whom was BIBA to ER for evaluation of AMS, associated with generalized weakness and fever for one day, tmax 100.3 at home. On admission, he found to have high fever, tachycardia and positive Urine analysis. The CT head, CXR , COVID 19 and renal US is negative. He has started on Ceftriaxone and the ID consult requested to assist with further evaluation and antibiotic management.     # Sepsis ( Fever + Leukocytosis)  # UTI  - BLood Cx 4/7 NG   - Urine Cx 4/7 E coli   # Metabolic encephalopathy- resolved  #DWAIN    Recommendations  - continue ceftriaxone 1g daily  - follow-up susceptibilities   - if sensitive to ceftriaxone -- can plan total 10 day course with vantin 200 mg BID     Please call or message on [employer] Teams if with any questions.  Spectra [phone]

## 2021-04-09 NOTE — PROGRESS NOTE ADULT - SUBJECTIVE AND OBJECTIVE BOX
CHAPINCITO YBARRA  86y, Male  Allergy: No Known Allergies      LOS  2d    CHIEF COMPLAINT:     INTERVAL EVENTS/HPI  - No acute events overnight  - T(F): , Max: 96.4 (04-09-21 @ 05:12)      - WBC Count: 10.44 (04-08-21 @ 06:44)  WBC Count: 5.46 (04-07-21 @ 04:00)     - Creatinine, Serum: 1.3 (04-08-21 @ 06:44)       ROS  unable to obtain history secondary to patient's mental status     VITALS:  T(F): 96.4, Max: 96.4 (04-09-21 @ 05:12)  HR: 66  BP: 125/64  RR: 20Vital Signs Last 24 Hrs  T(C): 35.8 (09 Apr 2021 05:12), Max: 35.8 (09 Apr 2021 05:12)  T(F): 96.4 (09 Apr 2021 05:12), Max: 96.4 (09 Apr 2021 05:12)  HR: 66 (09 Apr 2021 05:12) (66 - 91)  BP: 125/64 (09 Apr 2021 05:12) (118/59 - 132/71)  BP(mean): --  RR: 20 (09 Apr 2021 05:12) (20 - 20)  SpO2: 92% (08 Apr 2021 09:09) (92% - 92%)    PHYSICAL EXAM:  Gen: NAD, resting in bed  HEENT: Normocephalic, atraumatic  Neck: supple, no lymphadenopathy  CV: Regular rate & regular rhythm  Lungs: decreased BS at bases, no fremitus  Abdomen: Soft, BS present  Ext: Warm, well perfused  Neuro: non focal, awake  Skin: no rash, no erythema  Lines: no phlebitis    FH: Non-contributory  Social Hx: Non-contributory    TESTS & MEASUREMENTS:                        11.9   10.44 )-----------( 87       ( 08 Apr 2021 06:44 )             36.9     04-08    141  |  101  |  27<H>  ----------------------------<  145<H>  4.4   |  29  |  1.3    Ca    8.6      08 Apr 2021 06:44  Phos  4.6     04-08  Mg     2.0     04-08    TPro  6.1  /  Alb  3.6  /  TBili  0.7  /  DBili  0.3<H>  /  AST  40  /  ALT  17  /  AlkPhos  91  04-08      LIVER FUNCTIONS - ( 08 Apr 2021 06:44 )  Alb: 3.6 g/dL / Pro: 6.1 g/dL / ALK PHOS: 91 U/L / ALT: 17 U/L / AST: 40 U/L / GGT: x               Culture - Urine (collected 04-07-21 @ 05:25)  Source: .Urine Clean Catch (Midstream)  Preliminary Report (04-08-21 @ 12:52):    >100,000 CFU/ml Escherichia coli    Culture - Blood (collected 04-07-21 @ 04:20)  Source: .Blood Blood  Preliminary Report (04-08-21 @ 14:03):    No growth to date.    Culture - Blood (collected 04-07-21 @ 04:20)  Source: .Blood Blood  Preliminary Report (04-08-21 @ 14:03):    No growth to date.        Lactate, Blood: 2.0 mmol/L (04-07-21 @ 04:00)      INFECTIOUS DISEASES TESTING  Rapid RVP Result: NotDetec (04-07-21 @ 03:15)      INFLAMMATORY MARKERS      RADIOLOGY & ADDITIONAL TESTS:  I have personally reviewed the last available Chest xray  CXR  Xray Chest 1 View AP/PA:   EXAM:  XR CHEST 1 VIEW            PROCEDURE DATE:  04/07/2021            INTERPRETATION:  Clinical History / Reason for exam: Dyspnea    Comparison : Chest radiograph 4/7/2021.    Technique/Positioning: Frontal.    Findings:    Support devices: None.    Cardiac/mediastinum/hilum: Indeterminate    Lung parenchyma/Pleura: Within normal limits.    Skeleton/soft tissues: Unremarkable.    Impression:    No radiographic evidence of acute cardiopulmonary disease.                  RICHA ODONNELL MD; Attending Radiologist  This document has been electronically signed. Apr 7 2021  3:58PM (04-07-21 @ 09:10)      CT  CT Abdomen and Pelvis w/ IV Cont:   EXAM:  CT ABDOMEN AND PELVIS IC            PROCEDURE DATE:  04/07/2021            INTERPRETATION:  CLINICAL STATEMENT: Confusion and weakness, low-grade fever    TECHNIQUE: Contiguous axial CT images were obtained from the lower chest to the pubic symphysis using intravenous contrast.  Oral contrast not administered.  Reformatted images in the coronal and sagittal planes were acquired.    COMPARISON CT: None.    OTHER STUDIES USED FOR CORRELATION: None.      FINDINGS:    LOWER CHEST: Minimal bibasilar atelectasis.    HEPATOBILIARY: Unchanged low-attenuation liver lesions compatible with cysts versus hemangiomas. No intrahepatic dilated ducts with dilatation..    SPLEEN: Unremarkable.    PANCREAS: Unremarkable.    ADRENAL GLANDS: Unremarkable.    KIDNEYS: No hydronephrosis with mild bilateral perinephric stranding. There is also bilateral minimal ureteral uroepithelial enhancement without striated nephrogram. There is mild bladder wall thickening which is partially decompressed with small right anterolateral bladder diverticulum. The prostate gland is enlarged indenting base of bladder. Nonobstructed right lower pole 3 mm stone present. There is also a left cortical calcification measuring 2 mm.    ABDOMINOPELVIC NODES: Unremarkable.    PELVIC ORGANS: Unremarkable.    PERITONEUM/MESENTERY/BOWEL: No bowel obstruction. No right lower quadrant inflammation..    BONES/SOFT TISSUES: Bilateral fat-containing inguinal hernias. Severe multilevel degenerative disc disease worse between L1to and L5-S1 moderate bilateral hip degenerative change. Mild bilateral sacroiliac joint degenerative changes. Unchanged right L1 low-attenuation lesion likely a hemangioma.    OTHER: Aortic vascular calcifications present.      IMPRESSION:  1. Bilateral mild ureteral enhancement with bladder wall thickening. Differential considerations favor cystitis with ascending urinary tract infection; no hydroureteronephrosis  2. Nonobstructing right renal lower pole 3 mm stone, left renal cortical calcification consistent with sequelae of prior bouts of inflammation              LIZY STOREY MD; Attending Radiologist  This document has been electronically signed. Apr 7 2021  7:09AM (04-07-21 @ 06:36)      CARDIOLOGY TESTING  12 Lead ECG:   Ventricular Rate 102 BPM    Atrial Rate 64 BPM    QRS Duration 88 ms    Q-T Interval 356 ms    QTC Calculation(Bazett) 463 ms    R Axis 59 degrees    T Axis 35 degrees    Diagnosis Line Atrial fibrillation with rapid ventricular response with premature ventricular  or aberrantly conducted complexes  Nonspecific ST abnormality  Abnormal ECG    Confirmed by JOSÉ MIGUEL CARTER MD ([id number]) on 4/7/2021 12:32:58 PM (04-07-21 @ 08:40)  12 Lead ECG:   Ventricular Rate 99 BPM    Atrial Rate 92 BPM    QRS Duration 90 ms    Q-T Interval 342 ms    QTC Calculation(Bazett) 438 ms    R Axis 54 degrees    T Axis 54 degrees    Diagnosis Line Atrial fibrillation with premature ventricular or aberrantly conducted  complexes  Nonspecific ST abnormality  Abnormal ECG    Confirmed by JOSÉ MIGUEL CARTER MD ([id number]) on 4/7/2021 12:32:53 PM (04-07-21 @ 08:39)      MEDICATIONS  atorvastatin 20 Oral at bedtime  cefTRIAXone   IVPB 1000 IV Intermittent every 24 hours  dextrose 40% Gel 15 Oral once  dextrose 5%. 1000 IV Continuous <Continuous>  dextrose 5%. 1000 IV Continuous <Continuous>  dextrose 50% Injectable 25 IV Push once  dextrose 50% Injectable 12.5 IV Push once  dextrose 50% Injectable 25 IV Push once  finasteride 5 Oral daily  gabapentin 300 Oral three times a day  glucagon  Injectable 1 IntraMuscular once  insulin lispro (ADMELOG) corrective regimen sliding scale  SubCutaneous three times a day before meals  insulin lispro (ADMELOG) corrective regimen sliding scale  SubCutaneous at bedtime  metoprolol succinate ER 25 Oral daily  tamsulosin 0.4 Oral at bedtime      WEIGHT  Weight (kg): 90.2 (04-07-21 @ 17:28)      ANTIBIOTICS:  cefTRIAXone   IVPB 1000 milliGRAM(s) IV Intermittent every 24 hours      All available historical records have been reviewed       CHAPINCITO YBARRA  86y, Male  Allergy: No Known Allergies      LOS  2d    CHIEF COMPLAINT:     INTERVAL EVENTS/HPI  - No acute events overnight  - T(F): , Max: 96.4 (04-09-21 @ 05:12)  - feels well overall  - WBC Count: 10.44 (04-08-21 @ 06:44)  WBC Count: 5.46 (04-07-21 @ 04:00)     - Creatinine, Serum: 1.3 (04-08-21 @ 06:44)       ROS  unable to obtain history secondary to patient's mental status     VITALS:  T(F): 96.4, Max: 96.4 (04-09-21 @ 05:12)  HR: 66  BP: 125/64  RR: 20Vital Signs Last 24 Hrs  T(C): 35.8 (09 Apr 2021 05:12), Max: 35.8 (09 Apr 2021 05:12)  T(F): 96.4 (09 Apr 2021 05:12), Max: 96.4 (09 Apr 2021 05:12)  HR: 66 (09 Apr 2021 05:12) (66 - 91)  BP: 125/64 (09 Apr 2021 05:12) (118/59 - 132/71)  BP(mean): --  RR: 20 (09 Apr 2021 05:12) (20 - 20)  SpO2: 92% (08 Apr 2021 09:09) (92% - 92%)    PHYSICAL EXAM:  Gen: NAD, resting in bed  HEENT: Normocephalic, atraumatic  Neck: supple, no lymphadenopathy  CV: Regular rate & regular rhythm  Lungs: decreased BS at bases, no fremitus  Abdomen: Soft, BS present  Ext: Warm, well perfused  Neuro: non focal, awake  Skin: no rash, no erythema  Lines: no phlebitis    FH: Non-contributory  Social Hx: Non-contributory    TESTS & MEASUREMENTS:                        11.9   10.44 )-----------( 87       ( 08 Apr 2021 06:44 )             36.9     04-08    141  |  101  |  27<H>  ----------------------------<  145<H>  4.4   |  29  |  1.3    Ca    8.6      08 Apr 2021 06:44  Phos  4.6     04-08  Mg     2.0     04-08    TPro  6.1  /  Alb  3.6  /  TBili  0.7  /  DBili  0.3<H>  /  AST  40  /  ALT  17  /  AlkPhos  91  04-08      LIVER FUNCTIONS - ( 08 Apr 2021 06:44 )  Alb: 3.6 g/dL / Pro: 6.1 g/dL / ALK PHOS: 91 U/L / ALT: 17 U/L / AST: 40 U/L / GGT: x               Culture - Urine (collected 04-07-21 @ 05:25)  Source: .Urine Clean Catch (Midstream)  Preliminary Report (04-08-21 @ 12:52):    >100,000 CFU/ml Escherichia coli    Culture - Blood (collected 04-07-21 @ 04:20)  Source: .Blood Blood  Preliminary Report (04-08-21 @ 14:03):    No growth to date.    Culture - Blood (collected 04-07-21 @ 04:20)  Source: .Blood Blood  Preliminary Report (04-08-21 @ 14:03):    No growth to date.        Lactate, Blood: 2.0 mmol/L (04-07-21 @ 04:00)      INFECTIOUS DISEASES TESTING  Rapid RVP Result: NotDetec (04-07-21 @ 03:15)      INFLAMMATORY MARKERS      RADIOLOGY & ADDITIONAL TESTS:  I have personally reviewed the last available Chest xray  CXR  Xray Chest 1 View AP/PA:   EXAM:  XR CHEST 1 VIEW            PROCEDURE DATE:  04/07/2021            INTERPRETATION:  Clinical History / Reason for exam: Dyspnea    Comparison : Chest radiograph 4/7/2021.    Technique/Positioning: Frontal.    Findings:    Support devices: None.    Cardiac/mediastinum/hilum: Indeterminate    Lung parenchyma/Pleura: Within normal limits.    Skeleton/soft tissues: Unremarkable.    Impression:    No radiographic evidence of acute cardiopulmonary disease.                  RICHA ODONNELL MD; Attending Radiologist  This document has been electronically signed. Apr 7 2021  3:58PM (04-07-21 @ 09:10)      CT  CT Abdomen and Pelvis w/ IV Cont:   EXAM:  CT ABDOMEN AND PELVIS IC            PROCEDURE DATE:  04/07/2021            INTERPRETATION:  CLINICAL STATEMENT: Confusion and weakness, low-grade fever    TECHNIQUE: Contiguous axial CT images were obtained from the lower chest to the pubic symphysis using intravenous contrast.  Oral contrast not administered.  Reformatted images in the coronal and sagittal planes were acquired.    COMPARISON CT: None.    OTHER STUDIES USED FOR CORRELATION: None.      FINDINGS:    LOWER CHEST: Minimal bibasilar atelectasis.    HEPATOBILIARY: Unchanged low-attenuation liver lesions compatible with cysts versus hemangiomas. No intrahepatic dilated ducts with dilatation..    SPLEEN: Unremarkable.    PANCREAS: Unremarkable.    ADRENAL GLANDS: Unremarkable.    KIDNEYS: No hydronephrosis with mild bilateral perinephric stranding. There is also bilateral minimal ureteral uroepithelial enhancement without striated nephrogram. There is mild bladder wall thickening which is partially decompressed with small right anterolateral bladder diverticulum. The prostate gland is enlarged indenting base of bladder. Nonobstructed right lower pole 3 mm stone present. There is also a left cortical calcification measuring 2 mm.    ABDOMINOPELVIC NODES: Unremarkable.    PELVIC ORGANS: Unremarkable.    PERITONEUM/MESENTERY/BOWEL: No bowel obstruction. No right lower quadrant inflammation..    BONES/SOFT TISSUES: Bilateral fat-containing inguinal hernias. Severe multilevel degenerative disc disease worse between L1to and L5-S1 moderate bilateral hip degenerative change. Mild bilateral sacroiliac joint degenerative changes. Unchanged right L1 low-attenuation lesion likely a hemangioma.    OTHER: Aortic vascular calcifications present.      IMPRESSION:  1. Bilateral mild ureteral enhancement with bladder wall thickening. Differential considerations favor cystitis with ascending urinary tract infection; no hydroureteronephrosis  2. Nonobstructing right renal lower pole 3 mm stone, left renal cortical calcification consistent with sequelae of prior bouts of inflammation              LIZY STOREY MD; Attending Radiologist  This document has been electronically signed. Apr 7 2021  7:09AM (04-07-21 @ 06:36)      CARDIOLOGY TESTING  12 Lead ECG:   Ventricular Rate 102 BPM    Atrial Rate 64 BPM    QRS Duration 88 ms    Q-T Interval 356 ms    QTC Calculation(Bazett) 463 ms    R Axis 59 degrees    T Axis 35 degrees    Diagnosis Line Atrial fibrillation with rapid ventricular response with premature ventricular  or aberrantly conducted complexes  Nonspecific ST abnormality  Abnormal ECG    Confirmed by JOSÉ MIGUEL CARTER MD ([id number]) on 4/7/2021 12:32:58 PM (04-07-21 @ 08:40)  12 Lead ECG:   Ventricular Rate 99 BPM    Atrial Rate 92 BPM    QRS Duration 90 ms    Q-T Interval 342 ms    QTC Calculation(Bazett) 438 ms    R Axis 54 degrees    T Axis 54 degrees    Diagnosis Line Atrial fibrillation with premature ventricular or aberrantly conducted  complexes  Nonspecific ST abnormality  Abnormal ECG    Confirmed by JOSÉ MIGEUL CARTER MD ([id number]) on 4/7/2021 12:32:53 PM (04-07-21 @ 08:39)      MEDICATIONS  atorvastatin 20 Oral at bedtime  cefTRIAXone   IVPB 1000 IV Intermittent every 24 hours  dextrose 40% Gel 15 Oral once  dextrose 5%. 1000 IV Continuous <Continuous>  dextrose 5%. 1000 IV Continuous <Continuous>  dextrose 50% Injectable 25 IV Push once  dextrose 50% Injectable 12.5 IV Push once  dextrose 50% Injectable 25 IV Push once  finasteride 5 Oral daily  gabapentin 300 Oral three times a day  glucagon  Injectable 1 IntraMuscular once  insulin lispro (ADMELOG) corrective regimen sliding scale  SubCutaneous three times a day before meals  insulin lispro (ADMELOG) corrective regimen sliding scale  SubCutaneous at bedtime  metoprolol succinate ER 25 Oral daily  tamsulosin 0.4 Oral at bedtime      WEIGHT  Weight (kg): 90.2 (04-07-21 @ 17:28)      ANTIBIOTICS:  cefTRIAXone   IVPB 1000 milliGRAM(s) IV Intermittent every 24 hours      All available historical records have been reviewed

## 2021-04-09 NOTE — DISCHARGE NOTE PROVIDER - PROVIDER TOKENS
PROVIDER:[TOKEN:[[id number]],FOLLOWUP:[1 week]],PROVIDER:[TOKEN:[[id number]],FOLLOWUP:[2 weeks]]

## 2021-04-09 NOTE — DISCHARGE NOTE PROVIDER - CARE PROVIDERS DIRECT ADDRESSES
,DirectAddress_Unknown,[email or web address]

## 2021-04-09 NOTE — PROGRESS NOTE ADULT - ASSESSMENT
Patient is an 85 yo M with a PMHx of AFIB, BPH, CAD s/p CABG, HTN, DM, admission for UTI 2020, whom was BIBA to ED for ams x 1 day associated with generalized weakness and fever   In Ed patient had fever upto 104. he was found to have ascending UTI and was admitted    # Sepsis secondary to complicated ascending UTI- POA  urine culture growing E coli; sn to cephalosporins  blood culture- 2/2- NGTD  continue ceftriaxone D3  ID following  - if sensitive to ceftriaxone -- can plan total 10 day course with vantin 200 mg BID     # fever-resolved    # acute respiratory failure- with tachypnea secondary to high grade fever; resolved  acute CHF- ruled out    # mild elevation in trop- type 2 resolved    # AMS- secondary to metabolic encephalopathy- improved    # chronic A fib-discussed with cardiology office and patient has documented A fib  restart eliquis. patient has no hematuria and hg stable    # thrombocytopenia- related to sepsis; - improving    # failed bedside swallow; resolved  patient was re evaluated and speech recommended regular diet    # CAD s/p CABG  Plan: c/w statin, toprol,   restart asa patient take 3 times a week    #HTN (hypertension),  -low sodium diet  -monitor bp  -cont meds from home.     #DM  -ada diet  -monitor BS  -hold Januvia,metformin  -insulin coverage prn elevated blood sugar.     # BPH  #hypomagnesemia- replaced    Discharge plan

## 2021-04-09 NOTE — DISCHARGE NOTE PROVIDER - NSDCMRMEDTOKEN_GEN_ALL_CORE_FT
aspirin 325 mg oral tablet: 1 tab(s) orally once a week  atorvastatin 20 mg oral tablet: 1 tab(s) orally once a day  cefpodoxime 200 mg oral tablet: 1 tab(s) orally 2 times a day   Colace 100 mg oral capsule: 1 cap(s) orally 3 times a day  dutasteride 0.5 mg oral capsule: 1 cap(s) orally once a day  Eliquis 2.5 mg oral tablet: 1 tab(s) orally 2 times a day  folic acid 1 mg oral tablet: 1 tab(s) orally once a day  gabapentin 300 mg oral tablet: 1 tab(s) orally 3 times a day  Januvia 100 mg oral tablet: 1 tab(s) orally once a day  Lasix 40 mg oral tablet: 1 tab(s) orally once a day  metFORMIN 500 mg oral tablet: 1 tab(s) orally 2 times a day  metoprolol succinate 25 mg oral tablet, extended release: 1 tab(s) orally once a day  tamsulosin 0.4 mg oral capsule: 1 cap(s) orally once a day   aspirin 325 mg oral tablet: 1 tab(s) orally three times a week  atorvastatin 20 mg oral tablet: 1 tab(s) orally once a day  cefpodoxime 200 mg oral tablet: 1 tab(s) orally 2 times a day   Colace 100 mg oral capsule: 1 cap(s) orally 3 times a day  dutasteride 0.5 mg oral capsule: 1 cap(s) orally once a day  Eliquis 2.5 mg oral tablet: 1 tab(s) orally 2 times a day  folic acid 1 mg oral tablet: 1 tab(s) orally once a day  gabapentin 300 mg oral tablet: 1 tab(s) orally 3 times a day  Januvia 100 mg oral tablet: 1 tab(s) orally once a day  Lasix 40 mg oral tablet: 1 tab(s) orally once a day  metFORMIN 500 mg oral tablet: 1 tab(s) orally 2 times a day  metoprolol succinate 25 mg oral tablet, extended release: 1 tab(s) orally once a day  tamsulosin 0.4 mg oral capsule: 1 cap(s) orally once a day

## 2021-04-09 NOTE — DISCHARGE NOTE NURSING/CASE MANAGEMENT/SOCIAL WORK - PATIENT PORTAL LINK FT
You can access the FollowMyHealth Patient Portal offered by [facility] by registering at the following website: [email or web address]. By joining [facility]’s FollowMyHealth portal, you will also be able to view your health information using other applications (apps) compatible with our system.

## 2021-04-12 LAB
CULTURE RESULTS: SIGNIFICANT CHANGE UP
CULTURE RESULTS: SIGNIFICANT CHANGE UP
SPECIMEN SOURCE: SIGNIFICANT CHANGE UP
SPECIMEN SOURCE: SIGNIFICANT CHANGE UP

## 2021-04-14 DIAGNOSIS — J96.01 ACUTE RESPIRATORY FAILURE WITH HYPOXIA: ICD-10-CM

## 2021-04-14 DIAGNOSIS — Z98.42 CATARACT EXTRACTION STATUS, LEFT EYE: ICD-10-CM

## 2021-04-14 DIAGNOSIS — N39.0 URINARY TRACT INFECTION, SITE NOT SPECIFIED: ICD-10-CM

## 2021-04-14 DIAGNOSIS — E11.9 TYPE 2 DIABETES MELLITUS WITHOUT COMPLICATIONS: ICD-10-CM

## 2021-04-14 DIAGNOSIS — I11.0 HYPERTENSIVE HEART DISEASE WITH HEART FAILURE: ICD-10-CM

## 2021-04-14 DIAGNOSIS — G93.41 METABOLIC ENCEPHALOPATHY: ICD-10-CM

## 2021-04-14 DIAGNOSIS — Z79.84 LONG TERM (CURRENT) USE OF ORAL HYPOGLYCEMIC DRUGS: ICD-10-CM

## 2021-04-14 DIAGNOSIS — R53.1 WEAKNESS: ICD-10-CM

## 2021-04-14 DIAGNOSIS — I48.20 CHRONIC ATRIAL FIBRILLATION, UNSPECIFIED: ICD-10-CM

## 2021-04-14 DIAGNOSIS — R77.8 OTHER SPECIFIED ABNORMALITIES OF PLASMA PROTEINS: ICD-10-CM

## 2021-04-14 DIAGNOSIS — I25.10 ATHEROSCLEROTIC HEART DISEASE OF NATIVE CORONARY ARTERY WITHOUT ANGINA PECTORIS: ICD-10-CM

## 2021-04-14 DIAGNOSIS — N13.8 OTHER OBSTRUCTIVE AND REFLUX UROPATHY: ICD-10-CM

## 2021-04-14 DIAGNOSIS — E78.5 HYPERLIPIDEMIA, UNSPECIFIED: ICD-10-CM

## 2021-04-14 DIAGNOSIS — E83.42 HYPOMAGNESEMIA: ICD-10-CM

## 2021-04-14 DIAGNOSIS — D69.6 THROMBOCYTOPENIA, UNSPECIFIED: ICD-10-CM

## 2021-04-14 DIAGNOSIS — N17.9 ACUTE KIDNEY FAILURE, UNSPECIFIED: ICD-10-CM

## 2021-04-14 DIAGNOSIS — N40.1 BENIGN PROSTATIC HYPERPLASIA WITH LOWER URINARY TRACT SYMPTOMS: ICD-10-CM

## 2021-04-14 DIAGNOSIS — A41.9 SEPSIS, UNSPECIFIED ORGANISM: ICD-10-CM

## 2021-04-14 DIAGNOSIS — N20.0 CALCULUS OF KIDNEY: ICD-10-CM

## 2021-04-14 DIAGNOSIS — Z95.1 PRESENCE OF AORTOCORONARY BYPASS GRAFT: ICD-10-CM

## 2021-04-14 DIAGNOSIS — B96.20 UNSPECIFIED ESCHERICHIA COLI [E. COLI] AS THE CAUSE OF DISEASES CLASSIFIED ELSEWHERE: ICD-10-CM

## 2021-04-14 PROBLEM — I10 ESSENTIAL (PRIMARY) HYPERTENSION: Chronic | Status: ACTIVE | Noted: 2021-04-07

## 2021-04-22 ENCOUNTER — APPOINTMENT (OUTPATIENT)
Dept: CARDIOLOGY | Facility: CLINIC | Age: 86
End: 2021-04-22
Payer: MEDICARE

## 2021-04-22 VITALS
TEMPERATURE: 98.4 F | BODY MASS INDEX: 30.57 KG/M2 | SYSTOLIC BLOOD PRESSURE: 120 MMHG | HEART RATE: 50 BPM | DIASTOLIC BLOOD PRESSURE: 70 MMHG | WEIGHT: 207 LBS

## 2021-04-22 PROCEDURE — 99214 OFFICE O/P EST MOD 30 MIN: CPT

## 2021-04-22 PROCEDURE — 93000 ELECTROCARDIOGRAM COMPLETE: CPT

## 2021-04-22 PROCEDURE — 99072 ADDL SUPL MATRL&STAF TM PHE: CPT

## 2021-04-22 RX ORDER — METFORMIN HYDROCHLORIDE 500 MG/1
500 TABLET, COATED ORAL TWICE DAILY
Refills: 0 | Status: ACTIVE | COMMUNITY

## 2021-04-22 RX ORDER — GABAPENTIN 600 MG/1
600 TABLET, COATED ORAL 3 TIMES DAILY
Refills: 0 | Status: ACTIVE | COMMUNITY

## 2021-04-22 RX ORDER — GABAPENTIN 100 MG/1
100 CAPSULE ORAL
Refills: 0 | Status: DISCONTINUED | COMMUNITY
End: 2021-04-22

## 2021-04-22 RX ORDER — DUTASTERIDE 0.5 MG/1
0.5 CAPSULE, LIQUID FILLED ORAL DAILY
Refills: 0 | Status: ACTIVE | COMMUNITY

## 2021-04-22 NOTE — ASSESSMENT
[FreeTextEntry1] : Atrial fibrillation\par CAD\par S/P CABG\par Mild to moderate MR\par Mild AI\par Hypertension\par CKD\par DM\par Prior CVA

## 2021-04-22 NOTE — DISCUSSION/SUMMARY
[FreeTextEntry1] : Maintain eliquis 2.5 mg Q12h\par Results of patient's CT scans  were reviewed with him and his spouse in detail. He was informed of the findings. It's unclear as to why a CT scan of the head was performed and the patient did not know the reason either.\par Patient was counseled on his elevated serum glucose and his remaining lab values.\par He was counseled on improving his exercise to help raise his HDL cholesterol and to help lower his serum glucose levels.\par Fasting lipid profile CBC BMP hepatic panel\par RV in 6 months

## 2021-04-22 NOTE — PHYSICAL EXAM
[General Appearance - Well Developed] : well developed [General Appearance - Well Nourished] : well nourished [General Appearance - In No Acute Distress] : no acute distress [Normal Conjunctiva] : the conjunctiva exhibited no abnormalities [Normal Oral Mucosa] : normal oral mucosa [Normal Jugular Venous V Waves Present] : normal jugular venous V waves present [Heart Sounds] : normal S1 and S2 [Arterial Pulses Normal] : the arterial pulses were normal [Edema] : no peripheral edema present [Abdomen Soft] : soft [Abdomen Tenderness] : non-tender [Abnormal Walk] : normal gait [Nail Clubbing] : no clubbing of the fingernails [Cyanosis, Localized] : no localized cyanosis [Oriented To Time, Place, And Person] : oriented to person, place, and time [Affect] : the affect was normal [Memory Recent] : recent memory was not impaired [FreeTextEntry1] : Irregular rhythm, grade II/VI systolic murmur at LSB [Skin Color & Pigmentation] : normal skin color and pigmentation [Skin Turgor] : normal skin turgor [No Venous Stasis] : no venous stasis [No Anxiety] : not feeling anxious

## 2021-04-22 NOTE — REASON FOR VISIT
[FreeTextEntry1] : Patient was admitted to [facility] on 4/7-4/9/2021 for a UTI. He was treated with IV antibiotics and transitioned to oral antibiotic therapy. He  also underwent a CT scan of the head as well as a CT scan of the abdomen and pelvis. He is scheduled to see his PCP as well as the ID physician that he saw in the Hospital. He denies any symptoms of fever, urgency, or painful urination. His blood cultures were negative for any bacterial growth and his urine culture was abnormal for E.Coli. He will also see his Urologist. [Spouse] : spouse

## 2021-07-07 ENCOUNTER — RX RENEWAL (OUTPATIENT)
Age: 86
End: 2021-07-07

## 2021-09-21 ENCOUNTER — APPOINTMENT (OUTPATIENT)
Dept: CARDIOLOGY | Facility: CLINIC | Age: 86
End: 2021-09-21
Payer: MEDICARE

## 2021-09-21 VITALS
WEIGHT: 206 LBS | SYSTOLIC BLOOD PRESSURE: 118 MMHG | HEART RATE: 47 BPM | DIASTOLIC BLOOD PRESSURE: 69 MMHG | HEIGHT: 69 IN | BODY MASS INDEX: 30.51 KG/M2

## 2021-09-21 PROCEDURE — 99214 OFFICE O/P EST MOD 30 MIN: CPT

## 2021-09-21 PROCEDURE — 93000 ELECTROCARDIOGRAM COMPLETE: CPT

## 2021-09-21 RX ORDER — APIXABAN 2.5 MG/1
2.5 TABLET, FILM COATED ORAL TWICE DAILY
Qty: 180 | Refills: 3 | Status: DISCONTINUED | COMMUNITY
Start: 2020-08-25 | End: 2021-09-21

## 2021-09-21 RX ORDER — METOPROLOL SUCCINATE 25 MG/1
25 TABLET, EXTENDED RELEASE ORAL DAILY
Refills: 0 | Status: DISCONTINUED | COMMUNITY
End: 2021-09-21

## 2021-09-21 RX ORDER — METOPROLOL SUCCINATE 25 MG/1
25 TABLET, EXTENDED RELEASE ORAL
Qty: 90 | Refills: 3 | Status: DISCONTINUED | COMMUNITY
Start: 2020-09-27 | End: 2021-09-21

## 2021-09-21 RX ORDER — FOLIC ACID 1 MG/1
1 TABLET ORAL
Qty: 90 | Refills: 3 | Status: DISCONTINUED | COMMUNITY
Start: 2020-09-27 | End: 2021-09-21

## 2021-09-21 NOTE — PHYSICAL EXAM
[General Appearance - Well Developed] : well developed [General Appearance - Well Nourished] : well nourished [General Appearance - In No Acute Distress] : no acute distress [Normal Conjunctiva] : the conjunctiva exhibited no abnormalities [Normal Oral Mucosa] : normal oral mucosa [Normal Jugular Venous V Waves Present] : normal jugular venous V waves present [Heart Sounds] : normal S1 and S2 [Arterial Pulses Normal] : the arterial pulses were normal [Edema] : no peripheral edema present [Abdomen Soft] : soft [Abdomen Tenderness] : non-tender [Abnormal Walk] : normal gait [Nail Clubbing] : no clubbing of the fingernails [Cyanosis, Localized] : no localized cyanosis [Skin Color & Pigmentation] : normal skin color and pigmentation [Skin Turgor] : normal skin turgor [No Venous Stasis] : no venous stasis [Oriented To Time, Place, And Person] : oriented to person, place, and time [Affect] : the affect was normal [Memory Recent] : recent memory was not impaired [No Anxiety] : not feeling anxious [FreeTextEntry1] : Irregular rhythm, grade II/VI systolic murmur at LSB

## 2021-09-21 NOTE — HISTORY OF PRESENT ILLNESS
[FreeTextEntry1] : CAD\par S/P CABG\par Atrial fibrillation\par MR\par Hypertension\par Hyperlipidemia\par Admitted 4/21 for a UTI, underwent IV antibiotic therapy and had CT scan of head while hospitalized

## 2021-10-14 ENCOUNTER — APPOINTMENT (OUTPATIENT)
Dept: CARDIOLOGY | Facility: CLINIC | Age: 86
End: 2021-10-14
Payer: MEDICARE

## 2021-10-14 VITALS
WEIGHT: 206 LBS | TEMPERATURE: 97.8 F | HEART RATE: 56 BPM | HEIGHT: 69 IN | BODY MASS INDEX: 30.51 KG/M2 | SYSTOLIC BLOOD PRESSURE: 110 MMHG | DIASTOLIC BLOOD PRESSURE: 70 MMHG

## 2021-10-14 PROCEDURE — 93000 ELECTROCARDIOGRAM COMPLETE: CPT

## 2021-10-14 PROCEDURE — 99214 OFFICE O/P EST MOD 30 MIN: CPT

## 2021-10-14 NOTE — REASON FOR VISIT
[FreeTextEntry1] : Patient presents to the office stating that he is to undergo Radiation therapy of his left eye for a newly diagnosed lesion. Neither he nor his spouse had any information regarding his upcoming procedure. Presurgical testing was not performed yet and they both said that they are waiting for an information packet in the mail. 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

## 2021-10-14 NOTE — DISCUSSION/SUMMARY
[FreeTextEntry1] : The patient represents an intermediate risk for a perioperative cardiac event representing a 5-7% risk for MI, CHF, arrhythmia, and 2% mortality risk.\par This was explained to the patient and his spouse, particularly if surgery and enucleation is a possibility.\par If oral anticoagulation needs to be held, he is advised to hold the eliquis for not more than 3 days prior to his procedure and to restart it post procedure. Given his history of atrial fibrillation and prior CVA, any interruption of anticoagulation poses a risk for further embolic events.\par Maintain remaining medications.\par Patient has not undergone PAST and no lab testing was provided. The results will need to be reviewed with his operating physician,Elena Barrett.\par RV in 6 months

## 2021-10-14 NOTE — ASSESSMENT
[FreeTextEntry1] : Atrial fibrillation\par CAD\par S/P CABG\par Mild to moderate MR\par Mild AI\par Hypertension\par CKD\par DM\par Prior CVA\par Uveal melanoma of the left eye

## 2021-11-02 NOTE — ED ADULT NURSE NOTE - SUICIDE SCREENING QUESTION 1
DOCTOR'S VISIT: 
None-PCP makes housecalls SUBJECTIVE: 
Pt reports that her daughter took her to get her flu and COVID booster shots a couple of hours ago, that she is very tired, and her knee hurt. Pt's daughter reports that lifting her legs to get into car need Min A.  Pt's daughter reports that pt looks steadier and was able to transfer better today. \"I don't like to leave the house. \" NARRATIVE: 
Pt amb w RW >75 feet w SBA to include down and up 4 front steps w CGA w 2 handrails w step-to gait. Had pt perform Tinetti Balance Test.  Tinetti score on Initial Eval on 10/15 was 11/28;  today's score was 19/28. Pt ed for cont high risk for falls. Pt ed on cont to do quad ex for improve transfers. CAREGIVER INVOLVEMENT/ASSISTANCE NEEDED FOR: 
Pt's daughter and renter upstairs assists w ADLs, medications, meals, and transportation. HOME HEALTH SUPPLIES by type and quantity ordered/delivered this visit include: None ASSESSMENT AND PROGRESS TOWARD GOALS:   
Progressing well toward goals for gait, transfers, balance and strength w improved  Tinetti score from Initial Eval on 10/15 was 11/28;  today's score was 19/28, and was able to amb w RW >75 feet w SBA to include down and up 4 front steps w CGA w 2 handrails w step-to gait. 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 Patient will benefit from continued intervention with progression of all PT goals to improve functional independence, prevent falls, decrease burden of care, and improve quality of life.  
 
CONTINUED NEED FOR THE FOLLOWING SKILLS: 
HH PT is medically necessary to  address pain, decreased ROM of BLE, decreased strength, decreased independence with functional transfers, impaired gait, impaired stair negotiation, and impaired balance in order to improve functional independence, quality of life, return to PLOF, and reduce the risk for falls. PLAN FOR NEXT VISIT: 
Static balance and dynamic balance on foam 
 
DISCHARGE PLANNING was discussed with the pt/caregiver:  
Frequency: 2wk4, 1wk1, w 1wk1, 2wk1, 1wk1 remaining with anticipated DC on 11/15. No

## 2022-03-22 ENCOUNTER — APPOINTMENT (OUTPATIENT)
Dept: CARDIOLOGY | Facility: CLINIC | Age: 87
End: 2022-03-22
Payer: MEDICARE

## 2022-03-22 VITALS
HEIGHT: 69 IN | BODY MASS INDEX: 29.47 KG/M2 | TEMPERATURE: 98.2 F | DIASTOLIC BLOOD PRESSURE: 68 MMHG | HEART RATE: 57 BPM | SYSTOLIC BLOOD PRESSURE: 120 MMHG | WEIGHT: 199 LBS

## 2022-03-22 PROCEDURE — 93000 ELECTROCARDIOGRAM COMPLETE: CPT

## 2022-03-22 PROCEDURE — 99214 OFFICE O/P EST MOD 30 MIN: CPT

## 2022-03-22 NOTE — DISCUSSION/SUMMARY
[FreeTextEntry1] : The patient's lab test results were reviewed with him.\par He was advised on improving his serum glucose level.\par Maintain present  medications.\par Patient is advised to repeat a CBC BMP lipid and hepatic panel.\par RV in 6 months.

## 2022-05-23 NOTE — PHYSICAL THERAPY INITIAL EVALUATION ADULT - MANUAL MUSCLE TESTING RESULTS, REHAB EVAL
Recommend eye eco foaming lid cleanser qhs OU. Both upper extremites/Both lower extremities muscle strength grossly graded 3- to 3/5

## 2022-07-10 ENCOUNTER — RX RENEWAL (OUTPATIENT)
Age: 87
End: 2022-07-10

## 2022-09-21 ENCOUNTER — RX RENEWAL (OUTPATIENT)
Age: 87
End: 2022-09-21

## 2022-09-27 ENCOUNTER — APPOINTMENT (OUTPATIENT)
Dept: CARDIOLOGY | Facility: CLINIC | Age: 87
End: 2022-09-27

## 2022-09-27 VITALS
WEIGHT: 198 LBS | BODY MASS INDEX: 29.33 KG/M2 | SYSTOLIC BLOOD PRESSURE: 120 MMHG | HEIGHT: 69 IN | TEMPERATURE: 97.9 F | DIASTOLIC BLOOD PRESSURE: 64 MMHG | HEART RATE: 45 BPM

## 2022-09-27 PROCEDURE — 93000 ELECTROCARDIOGRAM COMPLETE: CPT

## 2022-09-27 PROCEDURE — 99214 OFFICE O/P EST MOD 30 MIN: CPT | Mod: 25

## 2022-09-27 RX ORDER — FUROSEMIDE 40 MG/1
40 TABLET ORAL DAILY
Refills: 0 | Status: DISCONTINUED | COMMUNITY
End: 2022-09-27

## 2022-09-27 RX ORDER — TAMSULOSIN HYDROCHLORIDE 0.4 MG/1
0.4 CAPSULE ORAL
Refills: 0 | Status: DISCONTINUED | COMMUNITY
End: 2022-09-27

## 2022-09-27 RX ORDER — SODIUM FLUORIDE 5 MG/G
1.1 GEL, DENTIFRICE DENTAL
Refills: 0 | Status: DISCONTINUED | COMMUNITY
End: 2022-09-27

## 2022-12-15 ENCOUNTER — RX RENEWAL (OUTPATIENT)
Age: 87
End: 2022-12-15

## 2023-03-28 ENCOUNTER — APPOINTMENT (OUTPATIENT)
Dept: CARDIOLOGY | Facility: CLINIC | Age: 88
End: 2023-03-28
Payer: MEDICARE

## 2023-03-28 VITALS
HEART RATE: 47 BPM | SYSTOLIC BLOOD PRESSURE: 120 MMHG | BODY MASS INDEX: 28.73 KG/M2 | HEIGHT: 69 IN | DIASTOLIC BLOOD PRESSURE: 70 MMHG | WEIGHT: 194 LBS

## 2023-03-28 PROCEDURE — 93000 ELECTROCARDIOGRAM COMPLETE: CPT

## 2023-03-28 PROCEDURE — 99214 OFFICE O/P EST MOD 30 MIN: CPT | Mod: 25

## 2023-03-28 NOTE — DISCUSSION/SUMMARY
[FreeTextEntry1] : The patient's lab test results were reviewed with him.\par He is at target goal with respect to his lipid levels\par He was advised on improving his serum glucose level.\par EKG: Atrial fibrillation\par Maintain present  medications.\par Patient is on OAC therapy with eliquis\par Patient is advised to repeat a CBC BMP lipid and hepatic panel.\par RV in 6 months. [EKG obtained to assist in diagnosis and management of assessed problem(s)] : EKG obtained to assist in diagnosis and management of assessed problem(s)

## 2023-06-27 ENCOUNTER — RX RENEWAL (OUTPATIENT)
Age: 88
End: 2023-06-27

## 2023-09-18 ENCOUNTER — RX RENEWAL (OUTPATIENT)
Age: 88
End: 2023-09-18

## 2023-09-18 RX ORDER — ATORVASTATIN CALCIUM 20 MG/1
20 TABLET, FILM COATED ORAL
Qty: 90 | Refills: 3 | Status: ACTIVE | COMMUNITY
Start: 2020-09-18 | End: 1900-01-01

## 2023-10-03 ENCOUNTER — APPOINTMENT (OUTPATIENT)
Dept: CARDIOLOGY | Facility: CLINIC | Age: 88
End: 2023-10-03
Payer: MEDICARE

## 2023-10-03 VITALS
HEART RATE: 35 BPM | WEIGHT: 190 LBS | BODY MASS INDEX: 28.14 KG/M2 | SYSTOLIC BLOOD PRESSURE: 128 MMHG | DIASTOLIC BLOOD PRESSURE: 70 MMHG | HEIGHT: 69 IN

## 2023-10-03 PROCEDURE — 93000 ELECTROCARDIOGRAM COMPLETE: CPT

## 2023-10-03 PROCEDURE — 99214 OFFICE O/P EST MOD 30 MIN: CPT | Mod: 25

## 2024-02-06 ENCOUNTER — RX RENEWAL (OUTPATIENT)
Age: 89
End: 2024-02-06

## 2024-02-06 RX ORDER — APIXABAN 2.5 MG/1
2.5 TABLET, FILM COATED ORAL
Qty: 180 | Refills: 3 | Status: ACTIVE | COMMUNITY
Start: 2021-11-29 | End: 1900-01-01

## 2024-02-17 NOTE — ASU PATIENT PROFILE, ADULT - MEDICATION ADMINISTRATION INFO, PROFILE
TRAUMA ACTIVATION LEVEL:  CODE / ALERT  / CONSULT  ACTIVATED BY: EMS  /  ED  INTUBATED:  YES / NO    MECHANISM OF INJURY:  [] Blunt     [] MVC	  [] Fall	  [] Pedestrian Struck	  [] Motorcycle     [] Assault     [] Bicycle collision    [] Sports injury    [] Penetrating    [] Gun Shot Wound      [] Stab Wound    GCS: 15 	E: 4	V: 5	M: 6    HPI:    83yF w/ PMHx of *** seen as (Code Trauma / Trauma Alert / Trauma Consult) s/p ******.  Trauma assessment in ED: ABCs intact , GCS 15 , AAOx3.    PAST MEDICAL & SURGICAL HISTORY:      Allergies    sulfa drugs (Anaphylaxis)  codeine (Anaphylaxis)    Intolerances        Home Medications:      ROS: 10-system review is otherwise negative except HPI above.      Primary Survey:    A - airway intact  B - bilateral breath sounds and good chest rise  C - palpable pulses in all extremities  D - GCS 15 on arrival, STOVALL  Exposure obtained    Vital Signs Last 24 Hrs  T(C): 36.6 (17 Feb 2024 00:40), Max: 36.6 (17 Feb 2024 00:40)  T(F): 97.9 (17 Feb 2024 00:40), Max: 97.9 (17 Feb 2024 00:40)  HR: 87 (17 Feb 2024 00:40) (87 - 87)  BP: 152/73 (17 Feb 2024 00:40) (152/73 - 152/73)  BP(mean): --  RR: 17 (17 Feb 2024 00:40) (17 - 17)  SpO2: --    Parameters below as of 17 Feb 2024 00:40  Patient On (Oxygen Delivery Method): room air        Secondary Survey:   General: NAD  HEENT: Normocephalic, atraumatic, EOMI, PEERLA. no scalp lacerations   Neck: Soft, midline trachea. no c-spine tenderness  Chest: No chest wall tenderness, no subcutaneous emphysema   Cardiac: S1, S2, RRR  Respiratory: Bilateral breath sounds, clear and equal bilaterally  Abdomen: Soft, non-distended, non-tender, no rebound, no guarding.  Groin: Normal appearing, pelvis stable   Ext:  Moving b/l upper and lower extremities. Palpable Radial b/l UE, b/l DP palpable in LE.   Back: No T/L/S spine tenderness, No palpable runoff/stepoff/deformity  Rectal: No samara blood, LIZ with good tone     FAST: *****/ Negative  5-item FRAIL scale (Fatigue, Resistance, Ambulation, Illnesses, & Loss of Weight)    - Fatigue: How much time during the previous 4 weeks did you feel tired?   All or most of the time [   ] Yes (1pt)    [  ] No  (0pts)  - Resistance: Do you have any difficulty walking up 10 steps alone without resting and without aids? [   ] Yes (1pt)    [  ] No  (0pts)  - Ambulation: Do you have any difficulty walking several hundred yards alone without aids? [   ] Yes (1pt)    [  ] No  (0pts)  - Illness: Do you have 5+ medical problems? [   ] 5 or more (1pt) [  ] < 5 (0pts)  (The total illnesses (0–11) are recoded as 0–4 = 0 and 5–11 = 1. The illnesses include hypertension, diabetes, cancer (other than a minor skin cancer), chronic lung disease, heart attack, congestive heart failure, angina, asthma, arthritis, stroke, and kidney disease.)    - Loss of weight: Have you had weight loss of 5% or more? [   ] Yes (1pt)    [  ] No  (0pts)    Total Score:     Score 1-2: Consult medical comangement  Score 3-4: Consult Geriatric service   Score 5: Consult Palliative service [id number]    Roula Ramírez G, Parvez PERLA, Lolly MYERS, Lauren B. Frality: toward a clinical definition. J AM Med Dir Assoc. 2008; 9 (2): 71-72  Jeff JE, John TK, Dudley DK. A simple frailty questionnaire (FRAIL) predicts outcomes in middle aged  Americans. J Nutr Health Aging. 2012; 16 (7): 601-608    ACCESS / DEVICES:   [ ] Peripheral IV  [ ] Central Venous Line	[ ] R	[ ] L	[ ] IJ	[ ] Fem	[ ] SC	Placed:   [ ] Urinary Catheter,  Date Placed:   [ ] Chest tube: [ ] Right, [ ] Left      Labs:  CAPILLARY BLOOD GLUCOSE      POCT Blood Glucose.: 141 mg/dL (17 Feb 2024 00:52)                  LFTs:         Coags:                        RADIOLOGY & ADDITIONAL STUDIES:    ASSESSMENT:  83y Female  w/ PMHx of *** seen as (Code Trauma / Trauma Alert / Trauma Consult) s/p ****** with complaint of *** , external signs of trauma include *** . Trauma assessment in ED: ABCs intact , GCS 15 , AAOx3,  STOVALL.     Injuries identified:   -   -   -     PLAN:   - Trauma Labs: (CBC, BMP, Coags, T&S, UA, EtOH level)  Additional studies:  EKG  Utox    Trauma Imaging to include the following:  - CXR, Pelvic Xray  - CT Head,  CT C-spine, CT Max/Face, CT Chest, CT Abd/Pelvis  - Extremity films: None    Additional consultations:  - Neurosurgery  - Orthopedics  - OMFS  - PT/Rehab/SW  - Hospitalist/Medicine     Disposition pending results of above labs and imaging  Above plan discussed with Trauma attending,  ***  , patient, patient family, and ED team  --------------------------------------------------------------------------------------  02-17-24 @ 01:28 TRAUMA ACTIVATION LEVEL:  ALERT   ACTIVATED BY:  ED  INTUBATED:  NO    MECHANISM OF INJURY:  [] Blunt     [] MVC	  [X] Fall	  [] Pedestrian Struck	  [] Motorcycle     [] Assault     [] Bicycle collision    [] Sports injury    [] Penetrating    [] Gun Shot Wound      [] Stab Wound    GCS: 15 	E: 4	V: 5	M: 6    HPI:    83yF w/ PMHx of Afib on xarelto seen as Trauma Alert  s/p mechanical fall down stairs.  Trauma assessment in ED: ABCs intact , GCS 15 , AAOx3. Patient states that while ambulating up stairs, she attempted to reach for object on stairs when the patient lost balance and well backwards down 4 stairs, striking her head. Patient states that she is currently taking xarelto for history of Afib and her last dose was taken this evening. Patient states that she did not lose consciousness and was able to ambulate after the fall. Patient currently endorsing posterior scalp pain around laceration. Patient denies, headache at this time and endorses mild nausea. Patient denies chest pain, shortness of breath, pain the extremities, pain with moving extremities,     PAST MEDICAL & SURGICAL HISTORY:      Allergies    sulfa drugs (Anaphylaxis)  codeine (Anaphylaxis)    Intolerances        Home Medications:      ROS: 10-system review is otherwise negative except HPI above.      Primary Survey:    A - airway intact  B - bilateral breath sounds and good chest rise  C - palpable pulses in all extremities  D - GCS 15 on arrival, STOVALL  Exposure obtained    Vital Signs Last 24 Hrs  T(C): 36.6 (17 Feb 2024 00:40), Max: 36.6 (17 Feb 2024 00:40)  T(F): 97.9 (17 Feb 2024 00:40), Max: 97.9 (17 Feb 2024 00:40)  HR: 87 (17 Feb 2024 00:40) (87 - 87)  BP: 152/73 (17 Feb 2024 00:40) (152/73 - 152/73)  BP(mean): --  RR: 17 (17 Feb 2024 00:40) (17 - 17)  SpO2: --    Parameters below as of 17 Feb 2024 00:40  Patient On (Oxygen Delivery Method): room air        Secondary Survey:   General: NAD  HEENT: Normocephalic, 2 cm linear posterior scalp laceration, EOMI, PEERLA  Neck: Soft, midline trachea. no c-spine tenderness  Chest: No chest wall tenderness, no subcutaneous emphysema   Cardiac: S1, S2, RRR  Respiratory: Bilateral breath sounds, clear and equal bilaterally  Abdomen: Soft, non-distended, non-tender, no rebound, no guarding.  Groin: Normal appearing, pelvis stable   Ext:  Moving b/l upper and lower extremities. Palpable Radial b/l UE, b/l DP palpable in LE.   Back: No T/L/S spine tenderness, No palpable runoff/stepoff/deformity      5-item FRAIL scale (Fatigue, Resistance, Ambulation, Illnesses, & Loss of Weight)    - Fatigue: How much time during the previous 4 weeks did you feel tired?   All or most of the time [   ] Yes (1pt)    [ x ] No  (0pts)  - Resistance: Do you have any difficulty walking up 10 steps alone without resting and without aids? [ x  ] Yes (1pt)    [  ] No  (0pts)  - Ambulation: Do you have any difficulty walking several hundred yards alone without aids? [   ] Yes (1pt)    [x  ] No  (0pts)  - Illness: Do you have 5+ medical problems? [ x  ] 5 or more (1pt) [  ] < 5 (0pts)  (The total illnesses (0–11) are recoded as 0–4 = 0 and 5–11 = 1. The illnesses include hypertension, diabetes, cancer (other than a minor skin cancer), chronic lung disease, heart attack, congestive heart failure, angina, asthma, arthritis, stroke, and kidney disease.)    - Loss of weight: Have you had weight loss of 5% or more? [   ] Yes (1pt)    [ x ] No  (0pts)    Total Score:     Score 1-2: Consult medical comangement  Score 3-4: Consult Geriatric service   Score 5: Consult Palliative service [id number]    Roula Ramírez G, Parvez PERLA, Lolly MYERS, Lauren B. Frality: toward a clinical definition. J AM Med Dir Assoc. 2008; 9 (2): 71-72  Jeff JE, John TK, Luis Enrique DK. A simple frailty questionnaire (FRAIL) predicts outcomes in middle aged  Americans. J Nutr Health Aging. 2012; 16 (7): 601-608    ACCESS / DEVICES:   [ x] Peripheral IV  [ ] Central Venous Line	[ ] R	[ ] L	[ ] IJ	[ ] Fem	[ ] SC	Placed:   [ ] Urinary Catheter,  Date Placed:   [ ] Chest tube: [ ] Right, [ ] Left      Labs:  CAPILLARY BLOOD GLUCOSE      POCT Blood Glucose.: 141 mg/dL (17 Feb 2024 00:52)              LFTs:         Coags:        RADIOLOGY & ADDITIONAL STUDIES:                     TRAUMA ACTIVATION LEVEL:  ALERT   ACTIVATED BY:  ED  INTUBATED:  NO    MECHANISM OF INJURY:  [] Blunt     [] MVC	  [X] Fall	  [] Pedestrian Struck	  [] Motorcycle     [] Assault     [] Bicycle collision    [] Sports injury    [] Penetrating    [] Gun Shot Wound      [] Stab Wound    GCS: 15 	E: 4	V: 5	M: 6    HPI:    83yF w/ PMHx of Afib on xarelto seen as Trauma Alert  s/p mechanical fall down stairs.  Trauma assessment in ED: ABCs intact , GCS 15 , AAOx3. Patient states that while ambulating up stairs, she attempted to reach for object on stairs when the patient lost balance and well backwards down 4 stairs, striking her head. Patient states that she is currently taking xarelto for history of Afib and her last dose was taken this evening. Patient states that she did not lose consciousness and was able to ambulate after the fall. Patient currently endorsing posterior scalp pain around laceration. Patient denies, headache at this time and endorses mild nausea. Patient denies chest pain, shortness of breath, pain the extremities, pain with moving extremities,     PAST MEDICAL & SURGICAL HISTORY:      Allergies    sulfa drugs (Anaphylaxis)  codeine (Anaphylaxis)    Intolerances        Home Medications:      ROS: 10-system review is otherwise negative except HPI above.      Primary Survey:    A - airway intact  B - bilateral breath sounds and good chest rise  C - palpable pulses in all extremities  D - GCS 15 on arrival, STOVALL  Exposure obtained    Vital Signs Last 24 Hrs  T(C): 36.6 (17 Feb 2024 00:40), Max: 36.6 (17 Feb 2024 00:40)  T(F): 97.9 (17 Feb 2024 00:40), Max: 97.9 (17 Feb 2024 00:40)  HR: 87 (17 Feb 2024 00:40) (87 - 87)  BP: 152/73 (17 Feb 2024 00:40) (152/73 - 152/73)  BP(mean): --  RR: 17 (17 Feb 2024 00:40) (17 - 17)  SpO2: --    Parameters below as of 17 Feb 2024 00:40  Patient On (Oxygen Delivery Method): room air        Secondary Survey:   General: NAD  HEENT: Normocephalic, 2 cm linear posterior scalp laceration, EOMI, PEERLA  Neck: Soft, midline trachea. no c-spine tenderness  Chest: No chest wall tenderness, no subcutaneous emphysema   Cardiac: S1, S2, RRR  Respiratory: Bilateral breath sounds, clear and equal bilaterally  Abdomen: Soft, non-distended, non-tender, no rebound, no guarding.  Groin: Normal appearing, pelvis stable   Ext:  Moving b/l upper and lower extremities. Palpable Radial b/l UE, b/l DP palpable in LE.   Back: No T/L/S spine tenderness, No palpable runoff/stepoff/deformity      5-item FRAIL scale (Fatigue, Resistance, Ambulation, Illnesses, & Loss of Weight)    - Fatigue: How much time during the previous 4 weeks did you feel tired?   All or most of the time [   ] Yes (1pt)    [ x ] No  (0pts)  - Resistance: Do you have any difficulty walking up 10 steps alone without resting and without aids? [ x  ] Yes (1pt)    [  ] No  (0pts)  - Ambulation: Do you have any difficulty walking several hundred yards alone without aids? [   ] Yes (1pt)    [x  ] No  (0pts)  - Illness: Do you have 5+ medical problems? [ x  ] 5 or more (1pt) [  ] < 5 (0pts)  (The total illnesses (0–11) are recoded as 0–4 = 0 and 5–11 = 1. The illnesses include hypertension, diabetes, cancer (other than a minor skin cancer), chronic lung disease, heart attack, congestive heart failure, angina, asthma, arthritis, stroke, and kidney disease.)    - Loss of weight: Have you had weight loss of 5% or more? [   ] Yes (1pt)    [ x ] No  (0pts)    Total Score:     Score 1-2: Consult medical comangement  Score 3-4: Consult Geriatric service   Score 5: Consult Palliative service [id number]    Roula Ramírez G, Parvez PERLA, Lolly MYERS, Lauren B. Frality: toward a clinical definition. J AM Med Dir Assoc. 2008; 9 (2): 71-72  Jeff JE, John TK, Luis Enrique DK. A simple frailty questionnaire (FRAIL) predicts outcomes in middle aged  Americans. J Nutr Health Aging. 2012; 16 (7): 601-608    ACCESS / DEVICES:   [ x] Peripheral IV  [ ] Central Venous Line	[ ] R	[ ] L	[ ] IJ	[ ] Fem	[ ] SC	Placed:   [ ] Urinary Catheter,  Date Placed:   [ ] Chest tube: [ ] Right, [ ] Left      Labs:  CAPILLARY BLOOD GLUCOSE      POCT Blood Glucose.: 141 mg/dL (17 Feb 2024 00:52)                          13.6   6.97  )-----------( 230      ( 17 Feb 2024 01:10 )             39.6       Auto Neutrophil %: 51.1 % (02-17-24 @ 01:10)  Auto Immature Granulocyte %: 0.6 % (02-17-24 @ 01:10)    02-17    140  |  103  |  21<H>  ----------------------------<  143<H>  4.2   |  21  |  1.1      Calcium: 9.7 mg/dL (02-17-24 @ 01:10)      LFTs:             6.6  | 0.4  | 13       ------------------[67      ( 17 Feb 2024 01:10 )  4.3  | x    | 12          Lipase:62     Amylase:x         Lactate, Blood: 3.4 mmol/L (02-17-24 @ 01:10)      Coags:     12.30  ----< 1.08    ( 17 Feb 2024 01:10 )     33.8            Urinalysis Basic - ( 17 Feb 2024 01:10 )    Color: x / Appearance: x / SG: x / pH: x  Gluc: 143 mg/dL / Ketone: x  / Bili: x / Urobili: x   Blood: x / Protein: x / Nitrite: x   Leuk Esterase: x / RBC: x / WBC x   Sq Epi: x / Non Sq Epi: x / Bacteria: x          RADIOLOGY & ADDITIONAL STUDIES:      Xray Pelvis AP only (02.17.24 @ 01:14)  No evidence of acute fracture.      CT Head No Cont (02.17.24 @ 01:47)  CT head:  No evidence of acute intracranial abnormality.  CT cervical spine:  No evidence of acute fracture of the cervical spine.                     TRAUMA ACTIVATION LEVEL:  ALERT   ACTIVATED BY:  ED  INTUBATED:  NO    MECHANISM OF INJURY:  [] Blunt     [] MVC	  [X] Fall	    GCS: 15 	E: 4	V: 5	M: 6    HPI:    83yF w/ PMHx of Afib on Xarelto seen as Trauma Alert  s/p mechanical fall down stairs.  Trauma assessment in ED: ABCs intact , GCS 15 , AAOx3. Patient states that while ambulating up stairs, she attempted to reach for object on stairs when the patient lost balance and well backwards down 4 stairs, striking her head. Patient states that she is currently taking xarelto for history of Afib and her last dose was taken this evening. Patient states that she did not lose consciousness and was able to ambulate after the fall. Patient currently endorsing posterior scalp pain around laceration. Patient denies, headache at this time and endorses mild nausea. Patient denies chest pain, shortness of breath, pain the extremities, pain with moving extremities,     PAST MEDICAL & SURGICAL HISTORY:      Allergies    sulfa drugs (Anaphylaxis)  codeine (Anaphylaxis)      ROS: 10-system review is otherwise negative except HPI above.      Primary Survey:    A - airway intact  B - bilateral breath sounds and good chest rise  C - palpable pulses in all extremities  D - GCS 15 on arrival, STOVALL  Exposure obtained    Vital Signs Last 24 Hrs  T(C): 36.6 (17 Feb 2024 00:40), Max: 36.6 (17 Feb 2024 00:40)  T(F): 97.9 (17 Feb 2024 00:40), Max: 97.9 (17 Feb 2024 00:40)  HR: 87 (17 Feb 2024 00:40) (87 - 87)  BP: 152/73 (17 Feb 2024 00:40) (152/73 - 152/73)  BP(mean): --  RR: 17 (17 Feb 2024 00:40) (17 - 17)  SpO2: --    Parameters below as of 17 Feb 2024 00:40  Patient On (Oxygen Delivery Method): room air      Secondary Survey:   General: NAD  HEENT: Normocephalic, 2 cm linear posterior scalp laceration, EOMI, PEERLA  Neck: Soft, midline trachea. no c-spine tenderness  Chest: No chest wall tenderness, no subcutaneous emphysema   Cardiac: S1, S2, RRR  Respiratory: Bilateral breath sounds, clear and equal bilaterally  Abdomen: Soft, non-distended, non-tender, no rebound, no guarding.  Groin: Normal appearing, pelvis stable   Ext:  Moving b/l upper and lower extremities. Palpable Radial b/l UE, b/l DP palpable in LE.   Back: No T/L/S spine tenderness, No palpable runoff/stepoff/deformity      5-item FRAIL scale (Fatigue, Resistance, Ambulation, Illnesses, & Loss of Weight)    - Fatigue: How much time during the previous 4 weeks did you feel tired?   All or most of the time [   ] Yes (1pt)    [ x ] No  (0pts)  - Resistance: Do you have any difficulty walking up 10 steps alone without resting and without aids? [ x  ] Yes (1pt)    [  ] No  (0pts)  - Ambulation: Do you have any difficulty walking several hundred yards alone without aids? [   ] Yes (1pt)    [x  ] No  (0pts)  - Illness: Do you have 5+ medical problems? [ x  ] 5 or more (1pt) [  ] < 5 (0pts)  (The total illnesses (0–11) are recoded as 0–4 = 0 and 5–11 = 1. The illnesses include hypertension, diabetes, cancer (other than a minor skin cancer), chronic lung disease, heart attack, congestive heart failure, angina, asthma, arthritis, stroke, and kidney disease.)    - Loss of weight: Have you had weight loss of 5% or more? [   ] Yes (1pt)    [ x ] No  (0pts)    Total Score:     Score 1-2: Consult medical comangement  Score 3-4: Consult Geriatric service   Score 5: Consult Palliative service [phone]    Roula Ramírez G, Parvze PERLA, Lolly MYERS, Lauren B. Frality: toward a clinical definition. J AM Med Dir Assoc. 2008; 9 (2): 71-72  Jeff JE, John TK, Dudley DK. A simple frailty questionnaire (FRAIL) predicts outcomes in middle aged  Americans. J Nutr Health Aging. 2012; 16 (7): 601-608    ACCESS / DEVICES:   [ x] Peripheral IV  [ ] Central Venous Line	[ ] R	[ ] L	[ ] IJ	[ ] Fem	[ ] SC	Placed:   [ ] Urinary Catheter,  Date Placed:   [ ] Chest tube: [ ] Right, [ ] Left      Labs:  CAPILLARY BLOOD GLUCOSE  POCT Blood Glucose.: 141 mg/dL (17 Feb 2024 00:52)                        13.6   6.97  )-----------( 230      ( 17 Feb 2024 01:10 )             39.6       Auto Neutrophil %: 51.1 % (02-17-24 @ 01:10)  Auto Immature Granulocyte %: 0.6 % (02-17-24 @ 01:10)    02-17  140  |  103  |  21<H>  ----------------------------<  143<H>  4.2   |  21  |  1.1    Calcium: 9.7 mg/dL (02-17-24 @ 01:10)    LFTs  6.6  | 0.4  | 13       ------------------[67      ( 17 Feb 2024 01:10 )  4.3  | x    | 12          Lipase:62        Lactate, Blood: 3.4 mmol/L (02-17-24 @ 01:10)    Coags:  12.30  ----< 1.08    ( 17 Feb 2024 01:10 )     33.8        Urinalysis Basic - ( 17 Feb 2024 01:10 )  Color: x / Appearance: x / SG: x / pH: x  Gluc: 143 mg/dL / Ketone: x  / Bili: x / Urobili: x   Blood: x / Protein: x / Nitrite: x   Leuk Esterase: x / RBC: x / WBC x   Sq Epi: x / Non Sq Epi: x / Bacteria: x      RADIOLOGY & ADDITIONAL STUDIES:  Xray Pelvis AP only (02.17.24 @ 01:14)  No evidence of acute fracture.    CT Head No Cont (02.17.24 @ 01:47)  CT head:  No evidence of acute intracranial abnormality.  CT cervical spine:  No evidence of acute fracture of the cervical spine. no concerns

## 2024-04-09 ENCOUNTER — APPOINTMENT (OUTPATIENT)
Dept: CARDIOLOGY | Facility: CLINIC | Age: 89
End: 2024-04-09
Payer: MEDICARE

## 2024-04-09 VITALS
HEIGHT: 69 IN | BODY MASS INDEX: 27.99 KG/M2 | WEIGHT: 189 LBS | SYSTOLIC BLOOD PRESSURE: 120 MMHG | HEART RATE: 47 BPM | DIASTOLIC BLOOD PRESSURE: 80 MMHG

## 2024-04-09 DIAGNOSIS — I25.10 ATHEROSCLEROTIC HEART DISEASE OF NATIVE CORONARY ARTERY W/OUT ANGINA PECTORIS: ICD-10-CM

## 2024-04-09 DIAGNOSIS — I48.91 UNSPECIFIED ATRIAL FIBRILLATION: ICD-10-CM

## 2024-04-09 DIAGNOSIS — E78.5 HYPERLIPIDEMIA, UNSPECIFIED: ICD-10-CM

## 2024-04-09 DIAGNOSIS — I34.0 NONRHEUMATIC MITRAL (VALVE) INSUFFICIENCY: ICD-10-CM

## 2024-04-09 DIAGNOSIS — Z95.1 PRESENCE OF AORTOCORONARY BYPASS GRAFT: ICD-10-CM

## 2024-04-09 DIAGNOSIS — I11.9 HYPERTENSIVE HEART DISEASE W/OUT HEART FAILURE: ICD-10-CM

## 2024-04-09 PROCEDURE — 99214 OFFICE O/P EST MOD 30 MIN: CPT | Mod: 25

## 2024-04-09 PROCEDURE — 93000 ELECTROCARDIOGRAM COMPLETE: CPT

## 2024-04-09 NOTE — PHYSICAL EXAM
[General Appearance - Well Developed] : well developed [General Appearance - Well Nourished] : well nourished [General Appearance - In No Acute Distress] : no acute distress [Normal Conjunctiva] : the conjunctiva exhibited no abnormalities [Normal Oral Mucosa] : normal oral mucosa [Normal Jugular Venous V Waves Present] : normal jugular venous V waves present [Heart Sounds] : normal S1 and S2 [Arterial Pulses Normal] : the arterial pulses were normal [Edema] : no peripheral edema present [FreeTextEntry1] : Irregular rhythm, grade II/VI systolic murmur at LSB [Abdomen Soft] : soft [Abdomen Tenderness] : non-tender [Abnormal Walk] : normal gait [Nail Clubbing] : no clubbing of the fingernails [Cyanosis, Localized] : no localized cyanosis [Skin Color & Pigmentation] : normal skin color and pigmentation [Skin Turgor] : normal skin turgor [No Venous Stasis] : no venous stasis [Oriented To Time, Place, And Person] : oriented to person, place, and time [Affect] : the affect was normal [Memory Recent] : recent memory was not impaired [No Anxiety] : not feeling anxious

## 2024-04-09 NOTE — DISCUSSION/SUMMARY
[FreeTextEntry1] : The patient's lab test results were reviewed with him. He is at target goal with respect to his lipid levels He was advised on improving his serum glucose level. EKG: Atrial fibrillation with a slow ventricular response. Rate of 47 bpm Maintain present  medications. Patient is on OAC therapy with eliquis Patient is advised to repeat a CBC BMP lipid and hepatic panel. RV in 6 months. [EKG obtained to assist in diagnosis and management of assessed problem(s)] : EKG obtained to assist in diagnosis and management of assessed problem(s)

## 2024-06-16 ENCOUNTER — RX RENEWAL (OUTPATIENT)
Age: 89
End: 2024-06-16

## 2024-06-16 RX ORDER — METOPROLOL SUCCINATE 25 MG/1
25 TABLET, EXTENDED RELEASE ORAL DAILY
Qty: 90 | Refills: 3 | Status: ACTIVE | COMMUNITY
Start: 2022-07-10 | End: 1900-01-01

## 2024-10-22 ENCOUNTER — APPOINTMENT (OUTPATIENT)
Dept: CARDIOLOGY | Facility: CLINIC | Age: 89
End: 2024-10-22
Payer: MEDICARE

## 2024-10-22 VITALS
HEART RATE: 42 BPM | BODY MASS INDEX: 27.11 KG/M2 | DIASTOLIC BLOOD PRESSURE: 74 MMHG | HEIGHT: 69 IN | WEIGHT: 183 LBS | SYSTOLIC BLOOD PRESSURE: 130 MMHG

## 2024-10-22 DIAGNOSIS — Z95.1 PRESENCE OF AORTOCORONARY BYPASS GRAFT: ICD-10-CM

## 2024-10-22 DIAGNOSIS — E78.5 HYPERLIPIDEMIA, UNSPECIFIED: ICD-10-CM

## 2024-10-22 DIAGNOSIS — I11.9 HYPERTENSIVE HEART DISEASE W/OUT HEART FAILURE: ICD-10-CM

## 2024-10-22 DIAGNOSIS — I48.91 UNSPECIFIED ATRIAL FIBRILLATION: ICD-10-CM

## 2024-10-22 DIAGNOSIS — I34.0 NONRHEUMATIC MITRAL (VALVE) INSUFFICIENCY: ICD-10-CM

## 2024-10-22 DIAGNOSIS — I25.10 ATHEROSCLEROTIC HEART DISEASE OF NATIVE CORONARY ARTERY W/OUT ANGINA PECTORIS: ICD-10-CM

## 2024-10-22 PROCEDURE — 93000 ELECTROCARDIOGRAM COMPLETE: CPT

## 2024-10-22 PROCEDURE — 99214 OFFICE O/P EST MOD 30 MIN: CPT | Mod: 25

## 2024-11-05 ENCOUNTER — INPATIENT (INPATIENT)
Facility: HOSPITAL | Age: 89
LOS: 7 days | Discharge: NURSING, REHAB OR LONG-TERM CARE | DRG: 189 | End: 2024-11-13
Attending: INTERNAL MEDICINE | Admitting: INTERNAL MEDICINE
Payer: MEDICARE

## 2024-11-05 ENCOUNTER — RESULT REVIEW (OUTPATIENT)
Age: 89
End: 2024-11-05

## 2024-11-05 VITALS
TEMPERATURE: 101 F | DIASTOLIC BLOOD PRESSURE: 76 MMHG | WEIGHT: 160.06 LBS | OXYGEN SATURATION: 95 % | SYSTOLIC BLOOD PRESSURE: 173 MMHG | RESPIRATION RATE: 22 BRPM | HEART RATE: 75 BPM

## 2024-11-05 DIAGNOSIS — J96.01 ACUTE RESPIRATORY FAILURE WITH HYPOXIA: ICD-10-CM

## 2024-11-05 DIAGNOSIS — Z95.1 PRESENCE OF AORTOCORONARY BYPASS GRAFT: Chronic | ICD-10-CM

## 2024-11-05 DIAGNOSIS — Z98.49 CATARACT EXTRACTION STATUS, UNSPECIFIED EYE: Chronic | ICD-10-CM

## 2024-11-05 LAB
ALBUMIN SERPL ELPH-MCNC: 3.5 G/DL — SIGNIFICANT CHANGE UP (ref 3.5–5.2)
ALBUMIN SERPL ELPH-MCNC: 4.4 G/DL — SIGNIFICANT CHANGE UP (ref 3.5–5.2)
ALP SERPL-CCNC: 113 U/L — SIGNIFICANT CHANGE UP (ref 30–115)
ALP SERPL-CCNC: 94 U/L — SIGNIFICANT CHANGE UP (ref 30–115)
ALT FLD-CCNC: 12 U/L — SIGNIFICANT CHANGE UP (ref 0–41)
ALT FLD-CCNC: 14 U/L — SIGNIFICANT CHANGE UP (ref 0–41)
AMMONIA BLD-MCNC: 14 UMOL/L — SIGNIFICANT CHANGE UP (ref 11–55)
AMORPH SED URNS QL MICRO: PRESENT
ANION GAP SERPL CALC-SCNC: 19 MMOL/L — HIGH (ref 7–14)
APPEARANCE UR: ABNORMAL
AST SERPL-CCNC: 20 U/L — SIGNIFICANT CHANGE UP (ref 0–41)
AST SERPL-CCNC: 24 U/L — SIGNIFICANT CHANGE UP (ref 0–41)
B-OH-BUTYR SERPL-SCNC: 0.2 MMOL/L — SIGNIFICANT CHANGE UP
BACTERIA # UR AUTO: ABNORMAL /HPF
BASE EXCESS BLDA CALC-SCNC: -2.8 MMOL/L — LOW (ref -2–3)
BASOPHILS # BLD AUTO: 0 K/UL — SIGNIFICANT CHANGE UP (ref 0–0.2)
BASOPHILS NFR BLD AUTO: 0 % — SIGNIFICANT CHANGE UP (ref 0–1)
BILIRUB DIRECT SERPL-MCNC: 0.3 MG/DL — SIGNIFICANT CHANGE UP (ref 0–0.3)
BILIRUB INDIRECT FLD-MCNC: 0.4 MG/DL — SIGNIFICANT CHANGE UP (ref 0.2–1.2)
BILIRUB SERPL-MCNC: 0.7 MG/DL — SIGNIFICANT CHANGE UP (ref 0.2–1.2)
BILIRUB SERPL-MCNC: 1 MG/DL — SIGNIFICANT CHANGE UP (ref 0.2–1.2)
BILIRUB UR-MCNC: ABNORMAL
BUN SERPL-MCNC: 20 MG/DL — SIGNIFICANT CHANGE UP (ref 10–20)
CALCIUM SERPL-MCNC: 9.3 MG/DL — SIGNIFICANT CHANGE UP (ref 8.4–10.5)
CHLORIDE SERPL-SCNC: 102 MMOL/L — SIGNIFICANT CHANGE UP (ref 98–110)
CO2 SERPL-SCNC: 22 MMOL/L — SIGNIFICANT CHANGE UP (ref 17–32)
COLOR SPEC: SIGNIFICANT CHANGE UP
CREAT SERPL-MCNC: 1.1 MG/DL — SIGNIFICANT CHANGE UP (ref 0.7–1.5)
CREAT SERPL-MCNC: 1.2 MG/DL — SIGNIFICANT CHANGE UP (ref 0.7–1.5)
DIFF PNL FLD: NEGATIVE — SIGNIFICANT CHANGE UP
EGFR: 59 ML/MIN/1.73M2 — LOW
EGFR: 65 ML/MIN/1.73M2 — SIGNIFICANT CHANGE UP
EOSINOPHIL # BLD AUTO: 0 K/UL — SIGNIFICANT CHANGE UP (ref 0–0.7)
EOSINOPHIL NFR BLD AUTO: 0 % — SIGNIFICANT CHANGE UP (ref 0–8)
EPI CELLS # UR: PRESENT
GLUCOSE BLDC GLUCOMTR-MCNC: 302 MG/DL — HIGH (ref 70–99)
GLUCOSE BLDC GLUCOMTR-MCNC: 307 MG/DL — HIGH (ref 70–99)
GLUCOSE BLDC GLUCOMTR-MCNC: 363 MG/DL — HIGH (ref 70–99)
GLUCOSE SERPL-MCNC: 232 MG/DL — HIGH (ref 70–99)
GLUCOSE UR QL: >=1000 MG/DL
HCO3 BLDA-SCNC: 23 MMOL/L — SIGNIFICANT CHANGE UP (ref 21–28)
HCT VFR BLD CALC: 37.1 % — LOW (ref 42–52)
HGB BLD-MCNC: 12.3 G/DL — LOW (ref 14–18)
HOROWITZ INDEX BLDA+IHG-RTO: 36 — SIGNIFICANT CHANGE UP
IMM GRANULOCYTES NFR BLD AUTO: 0.3 % — SIGNIFICANT CHANGE UP (ref 0.1–0.3)
INR BLD: 1.45 RATIO — HIGH (ref 0.65–1.3)
KETONES UR-MCNC: ABNORMAL MG/DL
LACTATE SERPL-SCNC: 3 MMOL/L — HIGH (ref 0.7–2)
LACTATE SERPL-SCNC: 3.6 MMOL/L — HIGH (ref 0.7–2)
LEUKOCYTE ESTERASE UR-ACNC: NEGATIVE — SIGNIFICANT CHANGE UP
LYMPHOCYTES # BLD AUTO: 0.46 K/UL — LOW (ref 1.2–3.4)
LYMPHOCYTES # BLD AUTO: 7.1 % — LOW (ref 20.5–51.1)
MCHC RBC-ENTMCNC: 30.4 PG — SIGNIFICANT CHANGE UP (ref 27–31)
MCHC RBC-ENTMCNC: 33.2 G/DL — SIGNIFICANT CHANGE UP (ref 32–37)
MCV RBC AUTO: 91.6 FL — SIGNIFICANT CHANGE UP (ref 80–94)
MONOCYTES # BLD AUTO: 0.71 K/UL — HIGH (ref 0.1–0.6)
MONOCYTES NFR BLD AUTO: 11 % — HIGH (ref 1.7–9.3)
NEUTROPHILS # BLD AUTO: 5.28 K/UL — SIGNIFICANT CHANGE UP (ref 1.4–6.5)
NEUTROPHILS NFR BLD AUTO: 81.6 % — HIGH (ref 42.2–75.2)
NITRITE UR-MCNC: NEGATIVE — SIGNIFICANT CHANGE UP
NRBC # BLD: 0 /100 WBCS — SIGNIFICANT CHANGE UP (ref 0–0)
NT-PROBNP SERPL-SCNC: 2163 PG/ML — HIGH (ref 0–300)
PCO2 BLDA: 44 MMHG — SIGNIFICANT CHANGE UP (ref 35–48)
PH BLDA: 7.33 — LOW (ref 7.35–7.45)
PH UR: 5.5 — SIGNIFICANT CHANGE UP (ref 5–8)
PLATELET # BLD AUTO: 87 K/UL — LOW (ref 130–400)
PMV BLD: 12.8 FL — HIGH (ref 7.4–10.4)
PO2 BLDA: 62 MMHG — LOW (ref 83–108)
POTASSIUM SERPL-MCNC: 4.5 MMOL/L — SIGNIFICANT CHANGE UP (ref 3.5–5)
POTASSIUM SERPL-SCNC: 4.5 MMOL/L — SIGNIFICANT CHANGE UP (ref 3.5–5)
PROT SERPL-MCNC: 5.9 G/DL — LOW (ref 6–8)
PROT SERPL-MCNC: 7.2 G/DL — SIGNIFICANT CHANGE UP (ref 6–8)
PROT UR-MCNC: 300 MG/DL
PROTHROM AB SERPL-ACNC: 17.2 SEC — HIGH (ref 9.95–12.87)
RBC # BLD: 4.05 M/UL — LOW (ref 4.7–6.1)
RBC # FLD: 14 % — SIGNIFICANT CHANGE UP (ref 11.5–14.5)
RBC CASTS # UR COMP ASSIST: 2 /HPF — SIGNIFICANT CHANGE UP (ref 0–4)
SAO2 % BLDA: 92.4 % — LOW (ref 94–98)
SARS-COV-2 RNA SPEC QL NAA+PROBE: DETECTED
SODIUM SERPL-SCNC: 143 MMOL/L — SIGNIFICANT CHANGE UP (ref 135–146)
SP GR SPEC: 1.02 — SIGNIFICANT CHANGE UP (ref 1–1.03)
SQUAMOUS # UR AUTO: 3 /HPF — SIGNIFICANT CHANGE UP (ref 0–5)
UROBILINOGEN FLD QL: 1 MG/DL — SIGNIFICANT CHANGE UP (ref 0.2–1)
WBC # BLD: 6.47 K/UL — SIGNIFICANT CHANGE UP (ref 4.8–10.8)
WBC # FLD AUTO: 6.47 K/UL — SIGNIFICANT CHANGE UP (ref 4.8–10.8)
WBC UR QL: 6 /HPF — HIGH (ref 0–5)

## 2024-11-05 PROCEDURE — 84540 ASSAY OF URINE/UREA-N: CPT

## 2024-11-05 PROCEDURE — 84443 ASSAY THYROID STIM HORMONE: CPT

## 2024-11-05 PROCEDURE — 70450 CT HEAD/BRAIN W/O DYE: CPT | Mod: MC

## 2024-11-05 PROCEDURE — 83935 ASSAY OF URINE OSMOLALITY: CPT

## 2024-11-05 PROCEDURE — 82728 ASSAY OF FERRITIN: CPT

## 2024-11-05 PROCEDURE — 86850 RBC ANTIBODY SCREEN: CPT

## 2024-11-05 PROCEDURE — 85027 COMPLETE CBC AUTOMATED: CPT

## 2024-11-05 PROCEDURE — 97110 THERAPEUTIC EXERCISES: CPT | Mod: GP

## 2024-11-05 PROCEDURE — 92526 ORAL FUNCTION THERAPY: CPT | Mod: GN

## 2024-11-05 PROCEDURE — 71045 X-RAY EXAM CHEST 1 VIEW: CPT | Mod: 26

## 2024-11-05 PROCEDURE — 82248 BILIRUBIN DIRECT: CPT

## 2024-11-05 PROCEDURE — 84439 ASSAY OF FREE THYROXINE: CPT

## 2024-11-05 PROCEDURE — 99285 EMERGENCY DEPT VISIT HI MDM: CPT

## 2024-11-05 PROCEDURE — 99223 1ST HOSP IP/OBS HIGH 75: CPT

## 2024-11-05 PROCEDURE — 93970 EXTREMITY STUDY: CPT | Mod: 26

## 2024-11-05 PROCEDURE — 84300 ASSAY OF URINE SODIUM: CPT

## 2024-11-05 PROCEDURE — 83605 ASSAY OF LACTIC ACID: CPT

## 2024-11-05 PROCEDURE — 93306 TTE W/DOPPLER COMPLETE: CPT

## 2024-11-05 PROCEDURE — 82010 KETONE BODYS QUAN: CPT

## 2024-11-05 PROCEDURE — 83036 HEMOGLOBIN GLYCOSYLATED A1C: CPT

## 2024-11-05 PROCEDURE — 82565 ASSAY OF CREATININE: CPT

## 2024-11-05 PROCEDURE — 82570 ASSAY OF URINE CREATININE: CPT

## 2024-11-05 PROCEDURE — 82962 GLUCOSE BLOOD TEST: CPT

## 2024-11-05 PROCEDURE — 87186 SC STD MICRODIL/AGAR DIL: CPT

## 2024-11-05 PROCEDURE — 87086 URINE CULTURE/COLONY COUNT: CPT

## 2024-11-05 PROCEDURE — 85610 PROTHROMBIN TIME: CPT

## 2024-11-05 PROCEDURE — 82140 ASSAY OF AMMONIA: CPT

## 2024-11-05 PROCEDURE — 71045 X-RAY EXAM CHEST 1 VIEW: CPT

## 2024-11-05 PROCEDURE — 97116 GAIT TRAINING THERAPY: CPT | Mod: GP

## 2024-11-05 PROCEDURE — 76770 US EXAM ABDO BACK WALL COMP: CPT

## 2024-11-05 PROCEDURE — 87635 SARS-COV-2 COVID-19 AMP PRB: CPT

## 2024-11-05 PROCEDURE — 97162 PT EVAL MOD COMPLEX 30 MIN: CPT | Mod: GP

## 2024-11-05 PROCEDURE — 36415 COLL VENOUS BLD VENIPUNCTURE: CPT

## 2024-11-05 PROCEDURE — 83735 ASSAY OF MAGNESIUM: CPT

## 2024-11-05 PROCEDURE — 84145 PROCALCITONIN (PCT): CPT

## 2024-11-05 PROCEDURE — 81001 URINALYSIS AUTO W/SCOPE: CPT

## 2024-11-05 PROCEDURE — 84156 ASSAY OF PROTEIN URINE: CPT

## 2024-11-05 PROCEDURE — 86900 BLOOD TYPING SEROLOGIC ABO: CPT

## 2024-11-05 PROCEDURE — 85379 FIBRIN DEGRADATION QUANT: CPT

## 2024-11-05 PROCEDURE — 80076 HEPATIC FUNCTION PANEL: CPT

## 2024-11-05 PROCEDURE — 80053 COMPREHEN METABOLIC PANEL: CPT

## 2024-11-05 PROCEDURE — 93970 EXTREMITY STUDY: CPT

## 2024-11-05 PROCEDURE — 93010 ELECTROCARDIOGRAM REPORT: CPT

## 2024-11-05 PROCEDURE — 87040 BLOOD CULTURE FOR BACTERIA: CPT

## 2024-11-05 PROCEDURE — 85025 COMPLETE CBC W/AUTO DIFF WBC: CPT

## 2024-11-05 PROCEDURE — 92610 EVALUATE SWALLOWING FUNCTION: CPT | Mod: GN

## 2024-11-05 PROCEDURE — 70450 CT HEAD/BRAIN W/O DYE: CPT | Mod: 26

## 2024-11-05 PROCEDURE — 84133 ASSAY OF URINE POTASSIUM: CPT

## 2024-11-05 PROCEDURE — 80048 BASIC METABOLIC PNL TOTAL CA: CPT

## 2024-11-05 PROCEDURE — 87077 CULTURE AEROBIC IDENTIFY: CPT

## 2024-11-05 PROCEDURE — 86140 C-REACTIVE PROTEIN: CPT

## 2024-11-05 PROCEDURE — 93005 ELECTROCARDIOGRAM TRACING: CPT

## 2024-11-05 PROCEDURE — 82803 BLOOD GASES ANY COMBINATION: CPT

## 2024-11-05 PROCEDURE — 93306 TTE W/DOPPLER COMPLETE: CPT | Mod: 26

## 2024-11-05 PROCEDURE — 86901 BLOOD TYPING SEROLOGIC RH(D): CPT

## 2024-11-05 PROCEDURE — 85652 RBC SED RATE AUTOMATED: CPT

## 2024-11-05 RX ORDER — IPRATROPIUM BROMIDE AND ALBUTEROL SULFATE .5; 2.5 MG/3ML; MG/3ML
3 SOLUTION RESPIRATORY (INHALATION)
Refills: 0 | Status: COMPLETED | OUTPATIENT
Start: 2024-11-05 | End: 2024-11-05

## 2024-11-05 RX ORDER — INSULIN LISPRO 100/ML
5 VIAL (ML) SUBCUTANEOUS ONCE
Refills: 0 | Status: COMPLETED | OUTPATIENT
Start: 2024-11-05 | End: 2024-11-05

## 2024-11-05 RX ORDER — DEXAMETHASONE 1.5 MG 1.5 MG/1
6 TABLET ORAL DAILY
Refills: 0 | Status: DISCONTINUED | OUTPATIENT
Start: 2024-11-06 | End: 2024-11-08

## 2024-11-05 RX ORDER — HYDRALAZINE HYDROCHLORIDE 50 MG/1
5 TABLET, FILM COATED ORAL ONCE
Refills: 0 | Status: COMPLETED | OUTPATIENT
Start: 2024-11-05 | End: 2024-11-05

## 2024-11-05 RX ORDER — ACETAMINOPHEN 500 MG
975 TABLET ORAL ONCE
Refills: 0 | Status: COMPLETED | OUTPATIENT
Start: 2024-11-05 | End: 2024-11-05

## 2024-11-05 RX ORDER — SENNA 187 MG
2 TABLET ORAL AT BEDTIME
Refills: 0 | Status: DISCONTINUED | OUTPATIENT
Start: 2024-11-05 | End: 2024-11-13

## 2024-11-05 RX ORDER — REMDESIVIR 100 MG/1
INJECTION, POWDER, LYOPHILIZED, FOR SOLUTION INTRAVENOUS
Refills: 0 | Status: COMPLETED | OUTPATIENT
Start: 2024-11-05 | End: 2024-11-09

## 2024-11-05 RX ORDER — TAMSULOSIN HCL 0.4 MG
0.4 CAPSULE ORAL AT BEDTIME
Refills: 0 | Status: DISCONTINUED | OUTPATIENT
Start: 2024-11-05 | End: 2024-11-13

## 2024-11-05 RX ORDER — KETOROLAC TROMETHAMINE 30 MG/ML
15 INJECTION INTRAMUSCULAR; INTRAVENOUS ONCE
Refills: 0 | Status: DISCONTINUED | OUTPATIENT
Start: 2024-11-05 | End: 2024-11-05

## 2024-11-05 RX ORDER — ENOXAPARIN SODIUM 40MG/0.4ML
80 SYRINGE (ML) SUBCUTANEOUS EVERY 12 HOURS
Refills: 0 | Status: DISCONTINUED | OUTPATIENT
Start: 2024-11-05 | End: 2024-11-06

## 2024-11-05 RX ORDER — NIFEDIPINE 90 MG
30 TABLET, EXTENDED RELEASE 24 HR ORAL AT BEDTIME
Refills: 0 | Status: DISCONTINUED | OUTPATIENT
Start: 2024-11-05 | End: 2024-11-13

## 2024-11-05 RX ORDER — FINASTERIDE 5 MG/1
5 TABLET, FILM COATED ORAL DAILY
Refills: 0 | Status: DISCONTINUED | OUTPATIENT
Start: 2024-11-05 | End: 2024-11-13

## 2024-11-05 RX ORDER — GLUCAGON INJECTION, SOLUTION 1 MG/.2ML
1 INJECTION, SOLUTION SUBCUTANEOUS ONCE
Refills: 0 | Status: DISCONTINUED | OUTPATIENT
Start: 2024-11-05 | End: 2024-11-13

## 2024-11-05 RX ORDER — METOPROLOL TARTRATE 50 MG
25 TABLET ORAL DAILY
Refills: 0 | Status: DISCONTINUED | OUTPATIENT
Start: 2024-11-05 | End: 2024-11-05

## 2024-11-05 RX ORDER — POLYETHYLENE GLYCOL 3350 17 G/17G
17 POWDER, FOR SOLUTION ORAL DAILY
Refills: 0 | Status: DISCONTINUED | OUTPATIENT
Start: 2024-11-05 | End: 2024-11-13

## 2024-11-05 RX ORDER — INSULIN LISPRO 100/ML
VIAL (ML) SUBCUTANEOUS
Refills: 0 | Status: DISCONTINUED | OUTPATIENT
Start: 2024-11-05 | End: 2024-11-13

## 2024-11-05 RX ORDER — METHYLPREDNISOLONE ACETATE 80 MG/ML
125 INJECTION, SUSPENSION INTRALESIONAL; INTRAMUSCULAR; INTRASYNOVIAL; SOFT TISSUE ONCE
Refills: 0 | Status: COMPLETED | OUTPATIENT
Start: 2024-11-05 | End: 2024-11-05

## 2024-11-05 RX ORDER — PANTOPRAZOLE SODIUM 40 MG/1
40 TABLET, DELAYED RELEASE ORAL DAILY
Refills: 0 | Status: DISCONTINUED | OUTPATIENT
Start: 2024-11-05 | End: 2024-11-07

## 2024-11-05 RX ORDER — REMDESIVIR 100 MG/1
200 INJECTION, POWDER, LYOPHILIZED, FOR SOLUTION INTRAVENOUS EVERY 24 HOURS
Refills: 0 | Status: COMPLETED | OUTPATIENT
Start: 2024-11-05 | End: 2024-11-05

## 2024-11-05 RX ORDER — REMDESIVIR 100 MG/1
100 INJECTION, POWDER, LYOPHILIZED, FOR SOLUTION INTRAVENOUS EVERY 24 HOURS
Refills: 0 | Status: COMPLETED | OUTPATIENT
Start: 2024-11-06 | End: 2024-11-09

## 2024-11-05 RX ORDER — SODIUM CHLORIDE 9 MG/ML
1000 INJECTION, SOLUTION INTRAMUSCULAR; INTRAVENOUS; SUBCUTANEOUS ONCE
Refills: 0 | Status: COMPLETED | OUTPATIENT
Start: 2024-11-05 | End: 2024-11-05

## 2024-11-05 RX ADMIN — FINASTERIDE 5 MILLIGRAM(S): 5 TABLET, FILM COATED ORAL at 23:17

## 2024-11-05 RX ADMIN — HYDRALAZINE HYDROCHLORIDE 5 MILLIGRAM(S): 50 TABLET, FILM COATED ORAL at 18:45

## 2024-11-05 RX ADMIN — KETOROLAC TROMETHAMINE 15 MILLIGRAM(S): 30 INJECTION INTRAMUSCULAR; INTRAVENOUS at 11:09

## 2024-11-05 RX ADMIN — Medication 975 MILLIGRAM(S): at 11:09

## 2024-11-05 RX ADMIN — IPRATROPIUM BROMIDE AND ALBUTEROL SULFATE 3 MILLILITER(S): .5; 2.5 SOLUTION RESPIRATORY (INHALATION) at 08:38

## 2024-11-05 RX ADMIN — Medication 5 UNIT(S): at 17:37

## 2024-11-05 RX ADMIN — REMDESIVIR 200 MILLIGRAM(S): 100 INJECTION, POWDER, LYOPHILIZED, FOR SOLUTION INTRAVENOUS at 16:49

## 2024-11-05 RX ADMIN — Medication 80 MILLIGRAM(S): at 16:49

## 2024-11-05 RX ADMIN — Medication 30 MILLIGRAM(S): at 23:19

## 2024-11-05 RX ADMIN — IPRATROPIUM BROMIDE AND ALBUTEROL SULFATE 3 MILLILITER(S): .5; 2.5 SOLUTION RESPIRATORY (INHALATION) at 08:55

## 2024-11-05 RX ADMIN — Medication 975 MILLIGRAM(S): at 08:54

## 2024-11-05 RX ADMIN — METHYLPREDNISOLONE ACETATE 125 MILLIGRAM(S): 80 INJECTION, SUSPENSION INTRALESIONAL; INTRAMUSCULAR; INTRASYNOVIAL; SOFT TISSUE at 09:30

## 2024-11-05 RX ADMIN — Medication 0.4 MILLIGRAM(S): at 23:19

## 2024-11-05 RX ADMIN — Medication 20 MILLIGRAM(S): at 23:16

## 2024-11-05 RX ADMIN — IPRATROPIUM BROMIDE AND ALBUTEROL SULFATE 3 MILLILITER(S): .5; 2.5 SOLUTION RESPIRATORY (INHALATION) at 09:28

## 2024-11-05 RX ADMIN — Medication 5: at 16:49

## 2024-11-05 RX ADMIN — SODIUM CHLORIDE 1000 MILLILITER(S): 9 INJECTION, SOLUTION INTRAMUSCULAR; INTRAVENOUS; SUBCUTANEOUS at 08:56

## 2024-11-05 RX ADMIN — KETOROLAC TROMETHAMINE 15 MILLIGRAM(S): 30 INJECTION INTRAMUSCULAR; INTRAVENOUS at 08:54

## 2024-11-05 RX ADMIN — Medication 2 TABLET(S): at 23:19

## 2024-11-05 RX ADMIN — Medication 70 MILLILITER(S): at 15:47

## 2024-11-05 NOTE — ED PROVIDER NOTE - OBJECTIVE STATEMENT
89-year-old male, past medical history of HTN, HLD, DM, CAD s/p CABG on Eliquis, BPH, presents to the ED for worsening cough onset 2 days ago.  Patient seen at PMD yesterday and diagnosed with COVID and prescribed Paxlovid.  Today patient was too weak to make it to the bathroom and urinated himself.  Denies fever, SOB, chest pain, nausea, vomiting, abdominal pain, diarrhea.  No antipyretics taken. 89-year-old male, past medical history of HTN, HLD, DM, CAD s/p CABG, Afib on Eliquis, BPH, presents to the ED for worsening cough onset 2 days ago.  Patient seen at PMD yesterday and diagnosed with COVID and prescribed Paxlovid.  Today patient was too weak to make it to the bathroom and urinated himself.  Denies fever, SOB, chest pain, nausea, vomiting, abdominal pain, diarrhea.  No antipyretics taken.

## 2024-11-05 NOTE — H&P ADULT - NSHPLABSRESULTS_GEN_ALL_CORE
Labs: Hb 12.3 no recent baseline, MCV 91, Cr 1.1 (1.3 2021), probp 2k  ABG: pH 7.33, Co2 44, hco3 23, pao2 62 on 5lit NC, Lactate 3  CXR: Rt CP angle blunting (pending read)

## 2024-11-05 NOTE — ED ADULT TRIAGE NOTE - SOURCE OF INFORMATION
Patient Post-Care Instructions: MOHS POST-OPERATIVE WOUND CARE\\n\\n1. Leave bandage on for the first 2 days.  Do not get the bandage wet (i.e., submerged in a bath, pool, or sweating).  Please limit activities while your stitches are in.  Stitches can break.  Skin can tear.  If you feel tension on the stitches you must stop that motion/activity.\\n\\n2. A shower may be taken after the first 2 days. Please take sponge baths during the first two days after your surgery, unless instructed otherwise.\\n\\n3. Carefully remove the bandage so as not to irritate the wound. \\n\\n4. Gently cleanse the area with soap and water.\\n\\n5. Apply a thin layer of Vaseline or Bacitracin ointment to the wound.  If another antibiotic has been prescribed, please apply as instructed on the prescription packaging. \\n\\n8. Cut a piece of non-stick gauze or Telfa pad to a size just large enough to cover the surgical site.\\n\\n9. Apply medical tape or hypoallergenic paper tape (for those with allergy to bandage adhesive) to hold your bandage in place.\\n\\n10.  The surgical site should be cleansed and dressed as described above on a daily basis until the day of   \\n   suture removal.  \\n\\n11.  If shaving around the area, leave your bandage on and shave around it.  Shaving over the sutures could \\n allow the wound to open up or increase your infection risk.\\n\\nDO NOT SWIM OR GO IN A HOT TUB UNTIL SUTURES HAVE BEEN REMOVED.\\n \\nADDITIONAL INSTRUCTIONS:\\n\\n1. Bruising and swelling may occur after the surgery and can increase for up to 48 hours.  Do not be alarmed--this will resolve over time.  Applying ice compresses over the bandage will help to decrease swelling and bruising:   ICE: Wrap a clean hand-towel or handkerchief around your ice pack.\\n Apply over the bandage for 15 minutes on and off for 2 hours to help reduce swelling.\\n\\n2. If slight bleeding occurs, apply continuous pressure for 20 minutes.  If bleeding persists or is severe, call the office.  If the office is closed, call 911 or go to the nearest emergency room.\\n\\n3. If the wound becomes hot or extremely tender to touch or it is bright red around the wound, extending 1 inch beyond the wound edge, please contact us at [phone].  If after hours, call [phone].\\n\\n4. Some oozing from the wound is normal.\\n\\n5. If healing without sutures:  Signs of healing may not be apparent for 2-3 weeks and the wound may take 6-8 weeks or longer to completely heal.  Clean the area with soap and water daily. Apply Vaseline daily or prescription ointment as directed.

## 2024-11-05 NOTE — H&P ADULT - NSICDXPASTSURGICALHX_GEN_ALL_CORE_FT
Continue therapy exercises at home     Warm compress to anterior knee     PAST SURGICAL HISTORY:  History of cataract surgery left eye, Sep 2019    S/P CABG x 3

## 2024-11-05 NOTE — ED PROVIDER NOTE - CLINICAL SUMMARY MEDICAL DECISION MAKING FREE TEXT BOX
89-year-old male history of hypertension dyslipidemia diabetes CAD A-fib on Eliquis plans for evaluation of cough.  Patient was diagnosed with COVID.  Yesterday today feeling weak unable to the bathroom urinated.  Here patient gurgling responsive but lethargic appearing bilateral rales S1-S2 soft nontender bilateral EXTR edema  Impression  Patient here with acute hypoxemia due to COVID given nebs steroids in ED admitted for further evaluation.

## 2024-11-05 NOTE — H&P ADULT - ATTENDING COMMENTS
89-year-old male, past medical history of HTN, HLD, DM, CAD s/p CABG ~10 years ago, paroxysmal Afib on Eliquis, BPH, presents to the ED for worsening cough x  2 days.  Patient seen at PMD yesterday and diagnosed with COVID and prescribed Paxlovid.  Today patient was too weak to make it to the bathroom and urinated before getting to the bathroom. Cough +ve for 2 days. SOB on exertion for last 2 days.      acute respiratory failure with hypoxemia / COVID     - decadron 6mg x 10days   - Remdisivir   - titrate oxygen to maintain pox>90%   - ID consult   - Lovenox 1mg/kg q12h until tolerating PO then resume Eliquis   - ID and pulmonary consults   - I spoke with family at bedside all questions answered

## 2024-11-05 NOTE — H&P ADULT - HISTORY OF PRESENT ILLNESS
89-year-old male, past medical history of HTN, HLD, DM, CAD s/p CABG ~10 years ago, Afib on Eliquis, BPH, presents to the ED for worsening cough onset 2 days ago. History obtained from son in law at bedside, patient lethargic and unable to provide history. Patient seen at PMD yesterday and diagnosed with COVID and prescribed Paxlovid.  Today patient was too weak to make it to the bathroom and urinated before getting to the bathroom. Cough +ve for 2 days. SOB on exertion for last 2 days.      In the ED  Temp 102.7 F, BP stable, sao2 90s on 5lit NC  Labs: Hb 12.3 no recent baseline, MCV 91, Cr 1.1 (1.3 2021), probp 2k  ABG: pH 7.33, Co2 44, hco3 23, pao2 62 on 5lit NC, Lactate 3  CXR: Rt CP angle blunting (pending read)    Admitted to SDU for further management of AHRF likely 2/2 covid pneumonia

## 2024-11-05 NOTE — ED ADULT NURSE NOTE - AVIAN FLU SYMPTOMS
History of Present Illness


General


Chief Complaint:  Chest Pain


Source:  Patient, Medical Record





Present Illness


HPI


73-year-old male history of hypertension, hyperlipidemia, presents with chest 

pain that started 10 hours prior to arrival, no aggravating alleviating factors 

severity is mild, constant, no nausea no vomiting, no sweating, patient does 

endorse some shortness of breath, he endorses a constant ache, points to the 

middle of his chest, patient presents for evaluation.


Additionally patient was also sent in for TB rule out given a positive 

QuantiFERON, patient is currently not exhibiting signs of cough, congestion, 

hemoptysis


Allergies:  


Coded Allergies:  


     No Known Allergies (Unverified , 8/19/18)





Patient History


Past Medical History:  see triage record


Reviewed Nursing Documentation:  PMH: Agreed; PSxH: Agreed





Nursing Documentation-PMH


Past Medical History:  No History, Except For


Hx Cardiac Problems:  Yes


Hx Hypertension:  Yes


Hx Cancer:  No


Hx Gastrointestinal Problems:  No


Hx Neurological Problems:  No





Review of Systems


All Other Systems:  negative except mentioned in HPI





Physical Exam





Vital Signs








  Date Time  Temp Pulse Resp B/P (MAP) Pulse Ox O2 Delivery O2 Flow Rate FiO2


 


10/25/19 17:42 98.1 70 16 140/72 (94) 98 Room Air  








Sp02 EP Interpretation:  reviewed, normal


General Appearance:  well appearing, no apparent distress, alert


Head:  normocephalic, atraumatic


Eyes:  bilateral eye PERRL, bilateral eye EOMI


ENT:  uvula midline, moist mucus membranes


Neck:  supple, thyroid normal, supple/symm/no masses


Respiratory:  lungs clear, no respiratory distress, no retraction, no accessory 

muscle use


Cardiovascular #1:  normal peripheral pulses, regular rate, rhythm, no edema, 

no gallop, no murmur


Gastrointestinal:  non tender, soft, no guarding, no rebound


Musculoskeletal:  normal inspection


Neurologic:  alert, oriented x3


Psychiatric:  mood/affect normal


Skin:  no rash, warm/dry





Medical Decision Making


Diagnostic Impression:  


 Primary Impression:  


 Chest pain


 Qualified Codes:  R07.9 - Chest pain, unspecified


 Additional Impression:  


 Tuberculosis


ER Course


73-year-old male presents with chest pain, no cough no congestion no hemoptysis

, differential diagnosis includes ACS, TB, pneumonia


Patient with negative troponin, patient received aspirin


Patient was also sent in for TB rule out given positive QuantiFERON and PPD, 

patient with a negative chest x-ray showing no acute processes


Patient placed in negative isolation


We will admit patient for TB rule out an ACS evaluation


Patient admitted to Dr. Reyes





Laboratory Tests








Test


  10/25/19


19:00


 


White Blood Count


  6.0 K/UL


(4.8-10.8)


 


Red Blood Count


  3.42 M/UL


(4.70-6.10)  L


 


Hemoglobin


  12.0 G/DL


(14.2-18.0)  L


 


Hematocrit


  34.5 %


(42.0-52.0)  L


 


Mean Corpuscular Volume


  101 FL (80-99)


H


 


Mean Corpuscular Hemoglobin


  35.0 PG


(27.0-31.0)  H


 


Mean Corpuscular Hemoglobin


Concent 34.8 G/DL


(32.0-36.0)


 


Red Cell Distribution Width


  13.0 %


(11.6-14.8)


 


Platelet Count


  100 K/UL


(150-450)  L


 


Mean Platelet Volume


  5.8 FL


(6.5-10.1)  L


 


Neutrophils (%) (Auto)


  65.4 %


(45.0-75.0)


 


Lymphocytes (%) (Auto)


  21.1 %


(20.0-45.0)


 


Monocytes (%) (Auto)


  11.6 %


(1.0-10.0)  H


 


Eosinophils (%) (Auto)


  1.5 %


(0.0-3.0)


 


Basophils (%) (Auto)


  0.4 %


(0.0-2.0)


 


Prothrombin Time


  11.3 SEC


(9.30-11.50)


 


Prothrombin Time INR 1.1 (0.9-1.1)  


 


PTT


  29 SEC (23-33)


 


 


Sodium Level


  143 MMOL/L


(136-145)


 


Potassium Level


  4.1 MMOL/L


(3.5-5.1)


 


Chloride Level


  108 MMOL/L


()  H


 


Carbon Dioxide Level


  24 MMOL/L


(21-32)


 


Anion Gap


  11 mmol/L


(5-15)


 


Blood Urea Nitrogen


  21 mg/dL


(7-18)  H


 


Creatinine


  0.8 MG/DL


(0.55-1.30)


 


Estimate Glomerular


Filtration Rate  mL/min (>60)  


 


 


Glucose Level


  112 MG/DL


()  H


 


Calcium Level


  9.1 MG/DL


(8.5-10.1)


 


Total Bilirubin


  0.8 MG/DL


(0.2-1.0)


 


Aspartate Amino Transferase


(AST) 49 U/L (15-37)


H


 


Alanine Aminotransferase (ALT)


  58 U/L (12-78)


 


 


Alkaline Phosphatase


  57 U/L


()


 


Total Creatine Kinase


  87 U/L


()


 


Creatine Kinase MB


  2.6 NG/ML


(0.0-3.6)


 


Creatine Kinase MB Relative


Index 2.9  


 


 


Troponin I


  0.002 ng/mL


(0.000-0.056)


 


Pro-B-Type Natriuretic Peptide


  78 pg/mL


(0-125)


 


Total Protein


  6.6 G/DL


(6.4-8.2)


 


Albumin


  3.2 G/DL


(3.4-5.0)  L


 


Globulin 3.4 g/dL  


 


Albumin/Globulin Ratio


  0.9 (1.0-2.7)


L


 


Lipase


  146 U/L


()








EKG Diagnostic Results


EKG Time:  17:50


EP Interpretation:  NSR, rate 64, QTc 476, no acute ST elevations, normal axis





Rhythm Strip Diag. Results


Rhythm Strip Time:  18:29


EP Interpretation:  yes


Rate:  84


Rhythm:  NSR, no PVC's, no ectopy





Chest X-Ray Diagnostic Results


Chest X-Ray Diagnostic Results :  


   Chest X-Ray Ordered:  Yes


   # of Views/Limited/Complete:  1 View


   Indication:  Chest Pain


   Interpretation:  no consolidation, no effusion, no pneumothorax, no acute 

cardiopulmonary disease


   Impression:  No acute disease


   Electronically Signed by:  Fadi Cruz MD





Last Vital Signs








  Date Time  Temp Pulse Resp B/P (MAP) Pulse Ox O2 Delivery O2 Flow Rate FiO2


 


10/25/19 17:42 98.1 70 16 140/72 (94) 98 Room Air  








Disposition:  ADMITTED AS INPATIENT


Condition:  Stable











Fadi Cruz MD Oct 25, 2019 18:29 Detail Level: Simple Additional Notes: Patient consent was obtained to proceed with the visit and recommended plan of care after discussion of all risks and benefits, including the risks of COVID-19 exposure. No

## 2024-11-05 NOTE — ED ADULT NURSE NOTE - FINAL NURSING ELECTRONIC SIGNATURE
05-Nov-2024 11:23 Stelara Counseling:  I discussed with the patient the risks of ustekinumab including but not limited to immunosuppression, malignancy, posterior leukoencephalopathy syndrome, and serious infections.  The patient understands that monitoring is required including a PPD at baseline and must alert us or the primary physician if symptoms of infection or other concerning signs are noted.

## 2024-11-05 NOTE — ED PROVIDER NOTE - PHYSICAL EXAMINATION
GENERAL: Ill appearing   SKIN: warm, dry  HEAD: Normocephalic; atraumatic.  EYES: 2mm pupils, PERRLA, EOMI, no conjunctival erythema  ENT: No nasal discharge; airway clear.  NECK: Supple; non tender.  CARD: Regular rate and rhythm. S1, S2 normal; no murmurs, gallops, or rubs.   RESP: Very wet gurgling cough and rhonchi; No wheezes, rales, or stridor.  ABD: soft, nontender, nondistended  EXT: 2+ pitting edema without tenderness   NEURO: A/ox3, grossly unremarkable  PSYCH: Cooperative, appropriate.

## 2024-11-05 NOTE — H&P ADULT - NSHPPHYSICALEXAM_GEN_ALL_CORE
Gen: lethargic, non verbal   Eyes: bilateral equal and reactive   HENT: Normocephalic, atraumatic. External ears normal, no rhinorrhea, moist mucous membranes.   CV: normal s1/s2, Systolic murmur   Resp: bilateral clear  Abd: soft, non tender  Back: No CVAT bilaterally, no midline ttp  Skin: dry, wwp   MSK: bilateral LE edema +ve  Neuro: lethargic, wakes up to pain and goes back to sleep, non focal

## 2024-11-05 NOTE — ED ADULT NURSE NOTE - NSFALLHARMRISKINTERV_ED_ALL_ED

## 2024-11-05 NOTE — H&P ADULT - ASSESSMENT
89-year-old male, past medical history of HTN, HLD, DM, CAD s/p CABG ~10 years ago, Afib on Eliquis, BPH, presents to the ED for worsening cough onset 2 days ago. Admitted to SDU for further management of AHRF likely 2/2 COVID PNA    #AHRF likely 2/2 COVID PNA  - HD stable, on 5lit NC 88-90 will switch to venti mask  - wbc wnl, fever 102 in ED  - ABG: pH 7.33, Co2 44, hco3 23, pao2 62 on 5lit NC, Lactate 3  - CXR: Rt CP angle blunting (pending read)  - s/p solumedrol 125mg iv x 1  - continue with dexa 6mg qd  - ID eval for Remdesivir   - procal, bcx, sputum cx, mrsa, Flu/covid/rsv swab, trend lactate   - gentle hydration while npo     #TME likely 2/2 covid   - CT head (on eliquis, fall 2 days ago from bed no head trauma/syncope/LOC/ENT bleeds per family )  - ABG noted no retention/hypercapnia  - TSH   - will treat for covid and monitor    #CAD s/p CABG ~10 years ago  - on eliquis 2.5mg bid will resume if no bleed on CTH  - lasix 40mg once a week, will hold for now  - metoprolol succinate 25mg qd, continue when able to take po   - lipitor 20mg qd, continue when able to take po     #DM  - On metformin 500mg tid +januvia 100mg qd hold  - Monitor FS, ISS for now   - gabapentin 300mg bid will hold given lethargy     #BPH  - Tamsulosin 0.4mg at bedtime AND DUTASTERIDE 0.5MG QD , continue when able to take po     DVT prophylaxis - on eliquis 2.5mg bid  GI prophylaxis - ppi while on steroids  NPO pending S&s eval  AAT

## 2024-11-05 NOTE — PATIENT PROFILE ADULT - FALL HARM RISK - HARM RISK INTERVENTIONS

## 2024-11-05 NOTE — ED ADULT NURSE NOTE - NSICDXPASTMEDICALHX_GEN_ALL_CORE_FT
PAST MEDICAL HISTORY:  3-vessel CAD     BPH with obstruction/lower urinary tract symptoms     Diabetes mellitus     E-coli UTI     HTN (hypertension)     Hyperlipemia

## 2024-11-05 NOTE — ED PROVIDER NOTE - CARE PLAN
Principal Discharge DX:	Acute hypoxic respiratory failure  Secondary Diagnosis:	2019 novel coronavirus disease (COVID-19)   1

## 2024-11-06 LAB
A1C WITH ESTIMATED AVERAGE GLUCOSE RESULT: 7.5 % — HIGH (ref 4–5.6)
ALBUMIN SERPL ELPH-MCNC: 3.6 G/DL — SIGNIFICANT CHANGE UP (ref 3.5–5.2)
ALBUMIN SERPL ELPH-MCNC: 3.6 G/DL — SIGNIFICANT CHANGE UP (ref 3.5–5.2)
ALP SERPL-CCNC: 87 U/L — SIGNIFICANT CHANGE UP (ref 30–115)
ALP SERPL-CCNC: 89 U/L — SIGNIFICANT CHANGE UP (ref 30–115)
ALT FLD-CCNC: 11 U/L — SIGNIFICANT CHANGE UP (ref 0–41)
ALT FLD-CCNC: 12 U/L — SIGNIFICANT CHANGE UP (ref 0–41)
ANION GAP SERPL CALC-SCNC: 12 MMOL/L — SIGNIFICANT CHANGE UP (ref 7–14)
AST SERPL-CCNC: 21 U/L — SIGNIFICANT CHANGE UP (ref 0–41)
AST SERPL-CCNC: 21 U/L — SIGNIFICANT CHANGE UP (ref 0–41)
BASOPHILS # BLD AUTO: 0 K/UL — SIGNIFICANT CHANGE UP (ref 0–0.2)
BASOPHILS # BLD AUTO: 0.01 K/UL — SIGNIFICANT CHANGE UP (ref 0–0.2)
BASOPHILS # BLD AUTO: 0.01 K/UL — SIGNIFICANT CHANGE UP (ref 0–0.2)
BASOPHILS NFR BLD AUTO: 0 % — SIGNIFICANT CHANGE UP (ref 0–1)
BASOPHILS NFR BLD AUTO: 0.1 % — SIGNIFICANT CHANGE UP (ref 0–1)
BASOPHILS NFR BLD AUTO: 0.1 % — SIGNIFICANT CHANGE UP (ref 0–1)
BILIRUB DIRECT SERPL-MCNC: 0.3 MG/DL — SIGNIFICANT CHANGE UP (ref 0–0.3)
BILIRUB INDIRECT FLD-MCNC: 0.3 MG/DL — SIGNIFICANT CHANGE UP (ref 0.2–1.2)
BILIRUB SERPL-MCNC: 0.6 MG/DL — SIGNIFICANT CHANGE UP (ref 0.2–1.2)
BILIRUB SERPL-MCNC: 0.7 MG/DL — SIGNIFICANT CHANGE UP (ref 0.2–1.2)
BLD GP AB SCN SERPL QL: SIGNIFICANT CHANGE UP
BUN SERPL-MCNC: 26 MG/DL — HIGH (ref 10–20)
CALCIUM SERPL-MCNC: 8.7 MG/DL — SIGNIFICANT CHANGE UP (ref 8.4–10.5)
CHLORIDE SERPL-SCNC: 105 MMOL/L — SIGNIFICANT CHANGE UP (ref 98–110)
CO2 SERPL-SCNC: 25 MMOL/L — SIGNIFICANT CHANGE UP (ref 17–32)
CREAT SERPL-MCNC: 0.9 MG/DL — SIGNIFICANT CHANGE UP (ref 0.7–1.5)
CRP SERPL-MCNC: 92.6 MG/L — HIGH
D DIMER BLD IA.RAPID-MCNC: 283 NG/ML DDU — HIGH
EGFR: 83 ML/MIN/1.73M2 — SIGNIFICANT CHANGE UP
EOSINOPHIL # BLD AUTO: 0 K/UL — SIGNIFICANT CHANGE UP (ref 0–0.7)
EOSINOPHIL NFR BLD AUTO: 0 % — SIGNIFICANT CHANGE UP (ref 0–8)
ERYTHROCYTE [SEDIMENTATION RATE] IN BLOOD: 17 MM/HR — HIGH (ref 0–10)
ESTIMATED AVERAGE GLUCOSE: 169 MG/DL — HIGH (ref 68–114)
FERRITIN SERPL-MCNC: 562 NG/ML — HIGH (ref 30–400)
GLUCOSE BLDC GLUCOMTR-MCNC: 238 MG/DL — HIGH (ref 70–99)
GLUCOSE BLDC GLUCOMTR-MCNC: 306 MG/DL — HIGH (ref 70–99)
GLUCOSE BLDC GLUCOMTR-MCNC: 321 MG/DL — HIGH (ref 70–99)
GLUCOSE BLDC GLUCOMTR-MCNC: 322 MG/DL — HIGH (ref 70–99)
GLUCOSE SERPL-MCNC: 243 MG/DL — HIGH (ref 70–99)
HCT VFR BLD CALC: 33.1 % — LOW (ref 42–52)
HCT VFR BLD CALC: 33.3 % — LOW (ref 42–52)
HCT VFR BLD CALC: 34.2 % — LOW (ref 42–52)
HGB BLD-MCNC: 11 G/DL — LOW (ref 14–18)
HGB BLD-MCNC: 11.2 G/DL — LOW (ref 14–18)
HGB BLD-MCNC: 11.3 G/DL — LOW (ref 14–18)
IMM GRANULOCYTES NFR BLD AUTO: 0.4 % — HIGH (ref 0.1–0.3)
IMM GRANULOCYTES NFR BLD AUTO: 0.5 % — HIGH (ref 0.1–0.3)
IMM GRANULOCYTES NFR BLD AUTO: 0.6 % — HIGH (ref 0.1–0.3)
INR BLD: 1.4 RATIO — HIGH (ref 0.65–1.3)
LACTATE SERPL-SCNC: 1.5 MMOL/L — SIGNIFICANT CHANGE UP (ref 0.7–2)
LYMPHOCYTES # BLD AUTO: 0.72 K/UL — LOW (ref 1.2–3.4)
LYMPHOCYTES # BLD AUTO: 0.94 K/UL — LOW (ref 1.2–3.4)
LYMPHOCYTES # BLD AUTO: 0.95 K/UL — LOW (ref 1.2–3.4)
LYMPHOCYTES # BLD AUTO: 10 % — LOW (ref 20.5–51.1)
LYMPHOCYTES # BLD AUTO: 11.2 % — LOW (ref 20.5–51.1)
LYMPHOCYTES # BLD AUTO: 13.5 % — LOW (ref 20.5–51.1)
MAGNESIUM SERPL-MCNC: 1.8 MG/DL — SIGNIFICANT CHANGE UP (ref 1.8–2.4)
MCHC RBC-ENTMCNC: 29.9 PG — SIGNIFICANT CHANGE UP (ref 27–31)
MCHC RBC-ENTMCNC: 30.1 PG — SIGNIFICANT CHANGE UP (ref 27–31)
MCHC RBC-ENTMCNC: 30.4 PG — SIGNIFICANT CHANGE UP (ref 27–31)
MCHC RBC-ENTMCNC: 33 G/DL — SIGNIFICANT CHANGE UP (ref 32–37)
MCHC RBC-ENTMCNC: 33 G/DL — SIGNIFICANT CHANGE UP (ref 32–37)
MCHC RBC-ENTMCNC: 33.8 G/DL — SIGNIFICANT CHANGE UP (ref 32–37)
MCV RBC AUTO: 89.7 FL — SIGNIFICANT CHANGE UP (ref 80–94)
MCV RBC AUTO: 90.5 FL — SIGNIFICANT CHANGE UP (ref 80–94)
MCV RBC AUTO: 91 FL — SIGNIFICANT CHANGE UP (ref 80–94)
MONOCYTES # BLD AUTO: 0.31 K/UL — SIGNIFICANT CHANGE UP (ref 0.1–0.6)
MONOCYTES # BLD AUTO: 0.41 K/UL — SIGNIFICANT CHANGE UP (ref 0.1–0.6)
MONOCYTES # BLD AUTO: 0.49 K/UL — SIGNIFICANT CHANGE UP (ref 0.1–0.6)
MONOCYTES NFR BLD AUTO: 4.8 % — SIGNIFICANT CHANGE UP (ref 1.7–9.3)
MONOCYTES NFR BLD AUTO: 5.2 % — SIGNIFICANT CHANGE UP (ref 1.7–9.3)
MONOCYTES NFR BLD AUTO: 5.8 % — SIGNIFICANT CHANGE UP (ref 1.7–9.3)
NEUTROPHILS # BLD AUTO: 4.29 K/UL — SIGNIFICANT CHANGE UP (ref 1.4–6.5)
NEUTROPHILS # BLD AUTO: 7.05 K/UL — HIGH (ref 1.4–6.5)
NEUTROPHILS # BLD AUTO: 7.88 K/UL — HIGH (ref 1.4–6.5)
NEUTROPHILS NFR BLD AUTO: 80.3 % — HIGH (ref 42.2–75.2)
NEUTROPHILS NFR BLD AUTO: 83.3 % — HIGH (ref 42.2–75.2)
NEUTROPHILS NFR BLD AUTO: 84.2 % — HIGH (ref 42.2–75.2)
NRBC # BLD: 0 /100 WBCS — SIGNIFICANT CHANGE UP (ref 0–0)
PLATELET # BLD AUTO: 75 K/UL — LOW (ref 130–400)
PLATELET # BLD AUTO: 84 K/UL — LOW (ref 130–400)
PLATELET # BLD AUTO: 89 K/UL — LOW (ref 130–400)
PMV BLD: 12.1 FL — HIGH (ref 7.4–10.4)
PMV BLD: 12.9 FL — HIGH (ref 7.4–10.4)
PMV BLD: 13.2 FL — HIGH (ref 7.4–10.4)
POTASSIUM SERPL-MCNC: 4.2 MMOL/L — SIGNIFICANT CHANGE UP (ref 3.5–5)
POTASSIUM SERPL-SCNC: 4.2 MMOL/L — SIGNIFICANT CHANGE UP (ref 3.5–5)
PROCALCITONIN SERPL-MCNC: 0.45 NG/ML — HIGH (ref 0.02–0.1)
PROT SERPL-MCNC: 5.9 G/DL — LOW (ref 6–8)
PROT SERPL-MCNC: 6 G/DL — SIGNIFICANT CHANGE UP (ref 6–8)
PROTHROM AB SERPL-ACNC: 16.6 SEC — HIGH (ref 9.95–12.87)
RBC # BLD: 3.66 M/UL — LOW (ref 4.7–6.1)
RBC # BLD: 3.69 M/UL — LOW (ref 4.7–6.1)
RBC # BLD: 3.78 M/UL — LOW (ref 4.7–6.1)
RBC # FLD: 14 % — SIGNIFICANT CHANGE UP (ref 11.5–14.5)
RBC # FLD: 14 % — SIGNIFICANT CHANGE UP (ref 11.5–14.5)
RBC # FLD: 14.1 % — SIGNIFICANT CHANGE UP (ref 11.5–14.5)
SODIUM SERPL-SCNC: 142 MMOL/L — SIGNIFICANT CHANGE UP (ref 135–146)
TSH SERPL-MCNC: 0.12 UIU/ML — LOW (ref 0.27–4.2)
WBC # BLD: 5.34 K/UL — SIGNIFICANT CHANGE UP (ref 4.8–10.8)
WBC # BLD: 8.47 K/UL — SIGNIFICANT CHANGE UP (ref 4.8–10.8)
WBC # BLD: 9.37 K/UL — SIGNIFICANT CHANGE UP (ref 4.8–10.8)
WBC # FLD AUTO: 5.34 K/UL — SIGNIFICANT CHANGE UP (ref 4.8–10.8)
WBC # FLD AUTO: 8.47 K/UL — SIGNIFICANT CHANGE UP (ref 4.8–10.8)
WBC # FLD AUTO: 9.37 K/UL — SIGNIFICANT CHANGE UP (ref 4.8–10.8)

## 2024-11-06 PROCEDURE — 99223 1ST HOSP IP/OBS HIGH 75: CPT

## 2024-11-06 PROCEDURE — 99232 SBSQ HOSP IP/OBS MODERATE 35: CPT

## 2024-11-06 PROCEDURE — 71045 X-RAY EXAM CHEST 1 VIEW: CPT | Mod: 26

## 2024-11-06 RX ORDER — INSULIN GLARGINE,HUM.REC.ANLOG 100/ML
7 VIAL (ML) SUBCUTANEOUS AT BEDTIME
Refills: 0 | Status: DISCONTINUED | OUTPATIENT
Start: 2024-11-06 | End: 2024-11-07

## 2024-11-06 RX ORDER — INSULIN LISPRO 100/ML
VIAL (ML) SUBCUTANEOUS AT BEDTIME
Refills: 0 | Status: DISCONTINUED | OUTPATIENT
Start: 2024-11-06 | End: 2024-11-13

## 2024-11-06 RX ORDER — APIXABAN 5 MG/1
2.5 TABLET, FILM COATED ORAL
Refills: 0 | Status: DISCONTINUED | OUTPATIENT
Start: 2024-11-06 | End: 2024-11-13

## 2024-11-06 RX ORDER — CHLORHEXIDINE GLUCONATE 40 MG/ML
1 SOLUTION TOPICAL DAILY
Refills: 0 | Status: DISCONTINUED | OUTPATIENT
Start: 2024-11-06 | End: 2024-11-13

## 2024-11-06 RX ADMIN — Medication 2: at 22:48

## 2024-11-06 RX ADMIN — POLYETHYLENE GLYCOL 3350 17 GRAM(S): 17 POWDER, FOR SOLUTION ORAL at 12:09

## 2024-11-06 RX ADMIN — PANTOPRAZOLE SODIUM 40 MILLIGRAM(S): 40 TABLET, DELAYED RELEASE ORAL at 12:09

## 2024-11-06 RX ADMIN — REMDESIVIR 200 MILLIGRAM(S): 100 INJECTION, POWDER, LYOPHILIZED, FOR SOLUTION INTRAVENOUS at 17:53

## 2024-11-06 RX ADMIN — Medication 20 MILLIGRAM(S): at 23:00

## 2024-11-06 RX ADMIN — APIXABAN 2.5 MILLIGRAM(S): 5 TABLET, FILM COATED ORAL at 17:53

## 2024-11-06 RX ADMIN — Medication 30 MILLIGRAM(S): at 23:34

## 2024-11-06 RX ADMIN — Medication 4: at 16:55

## 2024-11-06 RX ADMIN — FINASTERIDE 5 MILLIGRAM(S): 5 TABLET, FILM COATED ORAL at 12:09

## 2024-11-06 RX ADMIN — Medication 4: at 12:10

## 2024-11-06 RX ADMIN — Medication 2: at 06:00

## 2024-11-06 RX ADMIN — Medication 0.4 MILLIGRAM(S): at 23:34

## 2024-11-06 RX ADMIN — Medication 70 MILLILITER(S): at 08:09

## 2024-11-06 RX ADMIN — Medication 7 UNIT(S): at 22:57

## 2024-11-06 RX ADMIN — DEXAMETHASONE 1.5 MG 6 MILLIGRAM(S): 1.5 TABLET ORAL at 05:45

## 2024-11-06 NOTE — PHYSICAL THERAPY INITIAL EVALUATION ADULT - ADDITIONAL COMMENTS
Pt states that he lives in a private home with his wife. Pt states there are not EL and stairs inside. Pt states that he drives and he was independent PTA.

## 2024-11-06 NOTE — CONSULT NOTE ADULT - ASSESSMENT
Impression:     COVID positive   COVID pneumonia ?   Acute hypoxemic respiratory failure   CAD/ CABG  AFIB on Eliquis   Metabolic encephalopathy resolved       PLAN:    CNS: MS adequate. CTH negative.     HEENT: Oral care    PULMONARY:  HOB @ 45 degrees. Wean off O@. Goal more than 92%. CXR noted with RLL infiltrate.     CARDIOVASCULAR: DC IV fluids. Keep equal balance. Avoid overload. Echo noted with pulm htn, LA enlargement and normal EF.     GI: GI prophylaxis.  Feeding as tolerated. Daily LFTs while on Remdisivir.     RENAL:  Follow up lytes.  Correct as needed.     INFECTIOUS DISEASE: Follow up cultures: blood, UA. Procal. Nasal MRSA. On Dexamethasone and Remdisivir. ID evalaution. No leukocytosis.     HEMATOLOGICAL:  DVT prophylaxis: On Eliquis at baseline. Now on therapeutic Lovenox. VTE unlikely.     ENDOCRINE:  Follow up FS.  Insulin protocol if needed. Goal 140-180.     MUSCULOSKELETAL: Out of bed.     Medical floor if continues to improve.          Impression:     COVID positive   COVID pneumonia ?   Acute hypoxemic respiratory failure   CAD/ CABG  AFIB on Eliquis   Metabolic encephalopathy resolved       PLAN:    CNS: MS adequate. CTH negative.     HEENT: Oral care    PULMONARY:  HOB @ 45 degrees. Wean off O2. Goal more than 92%. CXR noted with RLL infiltrate.     CARDIOVASCULAR: DC IV fluids. Keep equal balance. Avoid overload. Echo noted with pulm htn, LA enlargement and normal EF.     GI: GI prophylaxis.  Feeding as tolerated. Daily LFTs while on Remdisivir.     RENAL:  Follow up lytes.  Correct as needed. Bladder scan.     INFECTIOUS DISEASE: Follow up cultures: blood, UA. Procal. Nasal MRSA. On Dexamethasone and Remdisivir. ID evalaution. No leukocytosis.     HEMATOLOGICAL:  DVT prophylaxis: On Eliquis at baseline. Now on therapeutic Lovenox. VTE unlikely.     ENDOCRINE:  Follow up FS.  Insulin protocol if needed. Goal 140-180.     MUSCULOSKELETAL: Out of bed.     Medical floor if continues to improve.

## 2024-11-06 NOTE — PROVIDER CONTACT NOTE (OTHER) - RECOMMENDATIONS
GABRIEL Barbour states to cont to monitor, ensure CBC is ordered for AM labs, assess for further bleeding

## 2024-11-06 NOTE — CONSULT NOTE ADULT - SUBJECTIVE AND OBJECTIVE BOX
INFECTIOUS DISEASE CONSULT NOTE    Patient is a 89y old  Male who presents with a chief complaint of sob/cough/weakness (06 Nov 2024 08:04)    HPI:  89-year-old male, past medical history of HTN, HLD, DM, CAD s/p CABG ~10 years ago, Afib on Eliquis, BPH, presents to the ED for worsening cough onset 2 days ago. History obtained from son in law at bedside, patient lethargic and unable to provide history. Patient seen at PMD yesterday and diagnosed with COVID and prescribed Paxlovid.  Today patient was too weak to make it to the bathroom and urinated before getting to the bathroom. Cough +ve for 2 days. SOB on exertion for last 2 days.      In the ED  Temp 102.7 F, BP stable, sao2 90s on 5lit NC  Labs: Hb 12.3 no recent baseline, MCV 91, Cr 1.1 (1.3 2021), probp 2k  ABG: pH 7.33, Co2 44, hco3 23, pao2 62 on 5lit NC, Lactate 3  CXR: Rt CP angle blunting (pending read)    Admitted to SDU for further management of AHRF likely 2/2 covid pneumonia    (05 Nov 2024 12:45)         Prior hospital charts reviewed [Yes]  Primary team notes reviewed [Yes]  Other consultant notes reviewed [Yes]    REVIEW OF SYSTEMS:      PAST MEDICAL & SURGICAL HISTORY:  3-vessel CAD      HTN (hypertension)      Diabetes mellitus      Hyperlipemia      BPH with obstruction/lower urinary tract symptoms      E-coli UTI      S/P CABG x 3      History of cataract surgery  left eye, Sep 2019          SOCIAL HISTORY:  - Born in _____, migrated to [place] in 19XX  - Currently working as / Retired  - Lives with _____; no pets  - No recent travel  - Denies tobacco use  - Denies alcohol use  - Denies illicit drug use  - Currently sexually active, has one male/female sexual partner    FAMILY HISTORY:      Allergies:  No Known Allergies      ANTIMICROBIALS:  remdesivir  IVPB    remdesivir  IVPB 100 every 24 hours      ANTIMICROBIALS (past 90 days):  MEDICATIONS  (STANDING):    remdesivir  IVPB   200 milliGRAM(s) IV Intermittent (11-05-24 @ 16:49)        OTHER MEDS:   MEDICATIONS  (STANDING):  atorvastatin 20 at bedtime  dexAMETHasone  Injectable 6 daily  dextrose 50% Injectable 25 once  dextrose 50% Injectable 25 once  dextrose 50% Injectable 12.5 once  dextrose Oral Gel 15 once PRN  enoxaparin Injectable 80 every 12 hours  finasteride 5 daily  glucagon  Injectable 1 once  insulin lispro (ADMELOG) corrective regimen sliding scale  three times a day before meals  NIFEdipine XL 30 at bedtime  pantoprazole  Injectable 40 daily  polyethylene glycol 3350 17 daily  senna 2 at bedtime  tamsulosin 0.4 at bedtime      VITALS:  Vital Signs Last 24 Hrs  T(F): 98.1 (11-06-24 @ 08:27), Max: 102.7 (11-05-24 @ 08:43)    Vital Signs Last 24 Hrs  HR: 52 (11-06-24 @ 07:00) (44 - 91)  BP: 106/53 (11-06-24 @ 07:00) (99/51 - 216/91)  RR: 14 (11-06-24 @ 07:00)  SpO2: 95% (11-06-24 @ 07:00) (89% - 97%)  Wt(kg): --    EXAM:    Labs:                        11.0   5.34  )-----------( 75       ( 06 Nov 2024 05:23 )             33.3     11-06    142  |  105  |  26[H]  ----------------------------<  243[H]  4.2   |  25  |  0.9    Ca    8.7      06 Nov 2024 05:23  Mg     1.8     11-06    TPro  6.0  /  Alb  3.6  /  TBili  0.6  /  DBili  0.3  /  AST  21  /  ALT  12  /  AlkPhos  89  11-06      WBC Trend:  WBC Count: 5.34 (11-06-24 @ 05:23)  WBC Count: 6.47 (11-05-24 @ 08:50)      Auto Neutrophil #: 4.29 K/uL (11-06-24 @ 05:23)  Auto Neutrophil #: 5.28 K/uL (11-05-24 @ 08:50)      Creatine Trend:  Creatinine: 0.9 (11-06)  Creatinine: 1.2 (11-05)  Creatinine: 1.1 (11-05)      Liver Biochemical Testing Trend:  Alanine Aminotransferase (ALT/SGPT): 12 (11-06)  Alanine Aminotransferase (ALT/SGPT): 11 (11-06)  Alanine Aminotransferase (ALT/SGPT): 12 (11-05)  Alanine Aminotransferase (ALT/SGPT): 14 (11-05)  Aspartate Aminotransferase (AST/SGOT): 21 (11-06-24 @ 06:12)  Aspartate Aminotransferase (AST/SGOT): 21 (11-06-24 @ 05:23)  Aspartate Aminotransferase (AST/SGOT): 20 (11-05-24 @ 19:49)  Aspartate Aminotransferase (AST/SGOT): 24 (11-05-24 @ 08:50)  Bilirubin Direct: 0.3 (11-06)  Bilirubin Total: 0.6 (11-06)  Bilirubin Total: 0.7 (11-06)  Bilirubin Direct: 0.3 (11-05)  Bilirubin Total: 0.7 (11-05)      Trend LDH      Auto Eosinophil %: 0.0 % (11-06-24 @ 05:23)  Auto Eosinophil %: 0.0 % (11-05-24 @ 08:50)      Urinalysis Basic - ( 06 Nov 2024 05:23 )    Color: x / Appearance: x / SG: x / pH: x  Gluc: 243 mg/dL / Ketone: x  / Bili: x / Urobili: x   Blood: x / Protein: x / Nitrite: x   Leuk Esterase: x / RBC: x / WBC x   Sq Epi: x / Non Sq Epi: x / Bacteria: x          MICROBIOLOGY:                                  COVID-19 PCR: Detected (11-05-24 @ 11:40)          C-Reactive Protein: 92.6 (11-06)      D-Dimer Assay, Quantitative: 283 (11-06)          Lactate, Blood: 1.5 (11-06 @ 05:23)  Lactate, Blood: 3.0 (11-05 @ 19:49)  Lactate, Blood: 3.6 (11-05 @ 15:54)  Blood Gas Arterial, Lactate: 3.0 (11-05 @ 12:18)      Sedimentation Rate, Erythrocyte: 17 mm/Hr (11-06-24 @ 05:23)      RADIOLOGY:  imaging below personally reviewed    < from: Xray Chest 1 View- PORTABLE-Urgent (11.05.24 @ 08:54) >  IMPRESSION:    No radiographic evidence of acute cardiopulmonary disease.    < end of copied text >    < from: CT Head No Cont (11.05.24 @ 12:59) >  IMPRESSION:  No acute intracranial pathology. No evidence of midline shift, mass   effect or intracranial hemorrhage.    < end of copied text >    < from: Xray Chest 1 View- PORTABLE-Routine (Xray Chest 1 View- PORTABLE-Routine in AM.) (11.06.24 @ 08:22) >  IMPRESSION:    Left basilar atelectasis.    < end of copied text >   INFECTIOUS DISEASE CONSULT NOTE    Patient is a 89y old  Male who presents with a chief complaint of sob/cough/weakness (06 Nov 2024 08:04)    HPI:  89-year-old male, past medical history of HTN, HLD, DM, CAD s/p CABG ~10 years ago, Afib on Eliquis, BPH, presents to the ED for worsening cough onset 2 days ago. History obtained from son in law at bedside, patient lethargic and unable to provide history. Patient seen at PMD yesterday and diagnosed with COVID and prescribed Paxlovid.  Today patient was too weak to make it to the bathroom and urinated before getting to the bathroom. Cough +ve for 2 days. SOB on exertion for last 2 days.      In the ED  Temp 102.7 F, BP stable, sao2 90s on 5lit NC  Labs: Hb 12.3 no recent baseline, MCV 91, Cr 1.1 (1.3 2021), probp 2k  ABG: pH 7.33, Co2 44, hco3 23, pao2 62 on 5lit NC, Lactate 3  CXR: Rt CP angle blunting (pending read)    Admitted to SDU for further management of AHRF likely 2/2 covid pneumonia    (05 Nov 2024 12:45)      Prior hospital charts reviewed [Yes]  Primary team notes reviewed [Yes]  Other consultant notes reviewed [Yes]    REVIEW OF SYSTEMS:  CONSTITUTIONAL: No fever or chills; feels cold  HEAD: No lesion on scalp  EYES: No visual disturbance  ENT: No sore throat  RESPIRATORY: + cough, + shortness of breath  CARDIOVASCULAR: No chest pain or palpitations  GASTROINTESTINAL: No abdominal or epigastric pain  GENITOURINARY: No dysuria  NEUROLOGICAL: No headache/dizziness  MUSCULOSKELETAL: No joint pain, erythema, or swelling; no back pain  SKIN: No itching, rashes  All other ROS negative except noted above      PAST MEDICAL & SURGICAL HISTORY:  3-vessel CAD      HTN (hypertension)      Diabetes mellitus      Hyperlipemia      BPH with obstruction/lower urinary tract symptoms      E-coli UTI      S/P CABG x 3      History of cataract surgery  left eye, Sep 2019          SOCIAL HISTORY:  - No recent travel  - Denies tobacco use  - Denies alcohol use  - Denies illicit drug use    FAMILY HISTORY:  Family history of CAD    Allergies:  No Known Allergies      ANTIMICROBIALS:  remdesivir  IVPB    remdesivir  IVPB 100 every 24 hours      ANTIMICROBIALS (past 90 days):  MEDICATIONS  (STANDING):    remdesivir  IVPB   200 milliGRAM(s) IV Intermittent (11-05-24 @ 16:49)        OTHER MEDS:   MEDICATIONS  (STANDING):  atorvastatin 20 at bedtime  dexAMETHasone  Injectable 6 daily  dextrose 50% Injectable 25 once  dextrose 50% Injectable 25 once  dextrose 50% Injectable 12.5 once  dextrose Oral Gel 15 once PRN  enoxaparin Injectable 80 every 12 hours  finasteride 5 daily  glucagon  Injectable 1 once  insulin lispro (ADMELOG) corrective regimen sliding scale  three times a day before meals  NIFEdipine XL 30 at bedtime  pantoprazole  Injectable 40 daily  polyethylene glycol 3350 17 daily  senna 2 at bedtime  tamsulosin 0.4 at bedtime      VITALS:  Vital Signs Last 24 Hrs  T(F): 98.1 (11-06-24 @ 08:27), Max: 102.7 (11-05-24 @ 08:43)    Vital Signs Last 24 Hrs  HR: 52 (11-06-24 @ 07:00) (44 - 91)  BP: 106/53 (11-06-24 @ 07:00) (99/51 - 216/91)  RR: 14 (11-06-24 @ 07:00)  SpO2: 95% (11-06-24 @ 07:00) (89% - 97%)  Wt(kg): --    EXAM:  GENERAL: NAD, lying in bed  HEAD: No head lesions  EYES: Conjunctiva pink and cornea white  EAR, NOSE, MOUTH, THROAT: Normal external ears and nose, no discharges; moist mucous membranes; + NC  NECK: Supple, nontender to palpation; no JVD  RESPIRATORY: Clear to auscultation bilaterally  CARDIOVASCULAR: S1 S2  GASTROINTESTINAL: Soft, nontender, nondistended; normoactive bowel sounds  GENITOURINARY: No tomlin catheter, no CVA tenderness  EXTREMITIES: No clubbing, cyanosis, or petal edema  NERVOUS SYSTEM: Alert and oriented to person, time, place and situation, speech clear. No focal deficits   MUSCULOSKELETAL: No joint erythema, swelling or pain  SKIN: No rashes or lesions, no superficial thrombophlebitis  PSYCH: Normal affect      Labs:                        11.0   5.34  )-----------( 75       ( 06 Nov 2024 05:23 )             33.3     11-06    142  |  105  |  26[H]  ----------------------------<  243[H]  4.2   |  25  |  0.9    Ca    8.7      06 Nov 2024 05:23  Mg     1.8     11-06    TPro  6.0  /  Alb  3.6  /  TBili  0.6  /  DBili  0.3  /  AST  21  /  ALT  12  /  AlkPhos  89  11-06      WBC Trend:  WBC Count: 5.34 (11-06-24 @ 05:23)  WBC Count: 6.47 (11-05-24 @ 08:50)      Auto Neutrophil #: 4.29 K/uL (11-06-24 @ 05:23)  Auto Neutrophil #: 5.28 K/uL (11-05-24 @ 08:50)      Creatine Trend:  Creatinine: 0.9 (11-06)  Creatinine: 1.2 (11-05)  Creatinine: 1.1 (11-05)      Liver Biochemical Testing Trend:  Alanine Aminotransferase (ALT/SGPT): 12 (11-06)  Alanine Aminotransferase (ALT/SGPT): 11 (11-06)  Alanine Aminotransferase (ALT/SGPT): 12 (11-05)  Alanine Aminotransferase (ALT/SGPT): 14 (11-05)  Aspartate Aminotransferase (AST/SGOT): 21 (11-06-24 @ 06:12)  Aspartate Aminotransferase (AST/SGOT): 21 (11-06-24 @ 05:23)  Aspartate Aminotransferase (AST/SGOT): 20 (11-05-24 @ 19:49)  Aspartate Aminotransferase (AST/SGOT): 24 (11-05-24 @ 08:50)  Bilirubin Direct: 0.3 (11-06)  Bilirubin Total: 0.6 (11-06)  Bilirubin Total: 0.7 (11-06)  Bilirubin Direct: 0.3 (11-05)  Bilirubin Total: 0.7 (11-05)      Trend LDH      Auto Eosinophil %: 0.0 % (11-06-24 @ 05:23)  Auto Eosinophil %: 0.0 % (11-05-24 @ 08:50)      Urinalysis Basic - ( 06 Nov 2024 05:23 )    Color: x / Appearance: x / SG: x / pH: x  Gluc: 243 mg/dL / Ketone: x  / Bili: x / Urobili: x   Blood: x / Protein: x / Nitrite: x   Leuk Esterase: x / RBC: x / WBC x   Sq Epi: x / Non Sq Epi: x / Bacteria: x          MICROBIOLOGY:    COVID-19 PCR: Detected (11-05-24 @ 11:40)    C-Reactive Protein: 92.6 (11-06)    D-Dimer Assay, Quantitative: 283 (11-06)    Lactate, Blood: 1.5 (11-06 @ 05:23)  Lactate, Blood: 3.0 (11-05 @ 19:49)  Lactate, Blood: 3.6 (11-05 @ 15:54)  Blood Gas Arterial, Lactate: 3.0 (11-05 @ 12:18)      Sedimentation Rate, Erythrocyte: 17 mm/Hr (11-06-24 @ 05:23)      RADIOLOGY:  imaging below personally reviewed    < from: Xray Chest 1 View- PORTABLE-Urgent (11.05.24 @ 08:54) >  IMPRESSION:    No radiographic evidence of acute cardiopulmonary disease.    < end of copied text >    < from: CT Head No Cont (11.05.24 @ 12:59) >  IMPRESSION:  No acute intracranial pathology. No evidence of midline shift, mass   effect or intracranial hemorrhage.    < end of copied text >    < from: Xray Chest 1 View- PORTABLE-Routine (Xray Chest 1 View- PORTABLE-Routine in AM.) (11.06.24 @ 08:22) >  IMPRESSION:    Left basilar atelectasis.    < end of copied text >

## 2024-11-06 NOTE — PROGRESS NOTE ADULT - SUBJECTIVE AND OBJECTIVE BOX
CHAPINCITO YBARRA 89y Male  MRN#: [id number]   CODE STATUS:________    Hospital Day: 1d    Pt is currently admitted with the primary diagnosis of COVID pneumonia       Present Today:   - Chong:  No [  ], Yes [   ] : Indication:     - Type of IV Access:       .. CVC/Piccline:  No [  ], Yes [   ] : Indication:       .. Midline: No [  ], Yes [   ] : Indication:                                             ----------------------------------------------------------  OBJECTIVE  PAST MEDICAL & SURGICAL HISTORY  3-vessel CAD    HTN (hypertension)    Diabetes mellitus    Hyperlipemia    BPH with obstruction/lower urinary tract symptoms    E-coli UTI    S/P CABG x 3    History of cataract surgery  left eye, Sep 2019                                              -----------------------------------------------------------  ALLERGIES:  No Known Allergies                                            ------------------------------------------------------------    HOME MEDICATIONS  Home Medications:  atorvastatin 20 mg oral tablet: 1 tab(s) orally once a day (05 Nov 2024 09:05)  Colace 100 mg oral capsule: 1 cap(s) orally 3 times a day (05 Nov 2024 09:05)  dutasteride 0.5 mg oral capsule: 1 cap(s) orally once a day (05 Nov 2024 09:05)  Eliquis 2.5 mg oral tablet: 1 tab(s) orally 2 times a day (05 Nov 2024 09:05)  folic acid 1 mg oral tablet: 1 tab(s) orally once a day (05 Nov 2024 09:05)  gabapentin 300 mg oral tablet: 1 tab(s) orally 3 times a day (05 Nov 2024 09:05)  Januvia 100 mg oral tablet: 1 tab(s) orally once a day (05 Nov 2024 09:05)  Lasix 40 mg oral tablet: 1 tab(s) orally once a day once a week (05 Nov 2024 09:04)  metFORMIN 500 mg oral tablet: 1 tab(s) orally 2 times a day (05 Nov 2024 09:05)  metoprolol succinate 25 mg oral tablet, extended release: 1 tab(s) orally once a day (05 Nov 2024 09:05)  tamsulosin 0.4 mg oral capsule: 1 cap(s) orally once a day (05 Nov 2024 09:05)                           MEDICATIONS:  STANDING MEDICATIONS  apixaban 2.5 milliGRAM(s) Oral two times a day  atorvastatin 20 milliGRAM(s) Oral at bedtime  dexAMETHasone  Injectable 6 milliGRAM(s) IV Push daily  dextrose 5%. 1000 milliLiter(s) IV Continuous <Continuous>  dextrose 5%. 1000 milliLiter(s) IV Continuous <Continuous>  dextrose 50% Injectable 25 Gram(s) IV Push once  dextrose 50% Injectable 12.5 Gram(s) IV Push once  dextrose 50% Injectable 25 Gram(s) IV Push once  finasteride 5 milliGRAM(s) Oral daily  glucagon  Injectable 1 milliGRAM(s) IntraMuscular once  insulin lispro (ADMELOG) corrective regimen sliding scale   SubCutaneous three times a day before meals  NIFEdipine XL 30 milliGRAM(s) Oral at bedtime  pantoprazole  Injectable 40 milliGRAM(s) IV Push daily  polyethylene glycol 3350 17 Gram(s) Oral daily  remdesivir  IVPB 100 milliGRAM(s) IV Intermittent every 24 hours  remdesivir  IVPB   IV Intermittent   senna 2 Tablet(s) Oral at bedtime  tamsulosin 0.4 milliGRAM(s) Oral at bedtime    PRN MEDICATIONS  dextrose Oral Gel 15 Gram(s) Oral once PRN                                            ------------------------------------------------------------  VITAL SIGNS: Last 24 Hours  T(C): 36.7 (06 Nov 2024 08:27), Max: 36.7 (06 Nov 2024 08:27)  T(F): 98.1 (06 Nov 2024 08:27), Max: 98.1 (06 Nov 2024 08:27)  HR: 52 (06 Nov 2024 07:00) (44 - 65)  BP: 106/53 (06 Nov 2024 07:00) (99/51 - 216/91)  BP(mean): 76 (06 Nov 2024 07:00) (74 - 180)  RR: 14 (06 Nov 2024 07:00) (14 - 29)  SpO2: 95% (06 Nov 2024 07:00) (91% - 97%)      11-05-24 @ 07:01  -  11-06-24 @ 07:00  --------------------------------------------------------  IN: 1315 mL / OUT: 860 mL / NET: 455 mL    11-06-24 @ 07:01  -  11-06-24 @ 13:32  --------------------------------------------------------  IN: 280 mL / OUT: 150 mL / NET: 130 mL                                             --------------------------------------------------------------  LABS:                        11.3   8.47  )-----------( 84       ( 06 Nov 2024 11:01 )             34.2     11-06    142  |  105  |  26[H]  ----------------------------<  243[H]  4.2   |  25  |  0.9    Ca    8.7      06 Nov 2024 05:23  Mg     1.8     11-06    TPro  6.0  /  Alb  3.6  /  TBili  0.6  /  DBili  0.3  /  AST  21  /  ALT  12  /  AlkPhos  89  11-06    PT/INR - ( 06 Nov 2024 06:12 )   PT: 16.60 sec;   INR: 1.40 ratio           Urinalysis Basic - ( 06 Nov 2024 05:23 )    Color: x / Appearance: x / SG: x / pH: x  Gluc: 243 mg/dL / Ketone: x  / Bili: x / Urobili: x   Blood: x / Protein: x / Nitrite: x   Leuk Esterase: x / RBC: x / WBC x   Sq Epi: x / Non Sq Epi: x / Bacteria: x      ABG - ( 05 Nov 2024 12:18 )  pH, Arterial: 7.33  pH, Blood: x     /  pCO2: 44    /  pO2: 62    / HCO3: 23    / Base Excess: -2.8  /  SaO2: 92.4              Lactate, Blood: 1.5 mmol/L (11-06-24 @ 05:23)  Sedimentation Rate, Erythrocyte: 17 mm/Hr *H* (11-06-24 @ 05:23)  Lactate, Blood: 3.0 mmol/L *H* (11-05-24 @ 19:49)  Lactate, Blood: 3.6 mmol/L *H* (11-05-24 @ 15:54)                                                      -------------------------------------------------------------  RADIOLOGY:                                            --------------------------------------------------------------    PHYSICAL EXAM:  Gen: NAD, non-toxic, conversational  Eyes: PERRLA, EOMI   HENT: Normocephalic, atraumatic. External ears normal, no rhinorrhea, moist mucous membranes.   CV: RRR, no M/R/G  Resp: CTAB, non-labored, speaking without difficulty on room air  Abd: soft, non tender, non rigid, no guarding or rebound tenderness  Back: No CVAT bilaterally, no midline ttp  Skin: dry, wwp   Neuro: AOx3, speech is fluent and appropriate

## 2024-11-06 NOTE — CONSULT NOTE ADULT - SUBJECTIVE AND OBJECTIVE BOX
Patient is a 89y old  Male who presents with a chief complaint of sob/cough/weakness (05 Nov 2024 12:45)      HPI:  89-year-old male, past medical history of HTN, HLD, DM, CAD s/p CABG ~10 years ago, Afib on Eliquis, BPH, presents to the ED for worsening cough onset 2 days ago. History obtained from son in law at bedside, patient lethargic and unable to provide history. Patient seen at PMD yesterday and diagnosed with COVID and prescribed Paxlovid.  Today patient was too weak to make it to the bathroom and urinated before getting to the bathroom. Cough +ve for 2 days. SOB on exertion for last 2 days.      In the ED  Temp 102.7 F, BP stable, sao2 90s on 5lit NC  Labs: Hb 12.3 no recent baseline, MCV 91, Cr 1.1 (1.3 2021), probp 2k  ABG: pH 7.33, Co2 44, hco3 23, pao2 62 on 5lit NC, Lactate 3  CXR: Rt CP angle blunting (pending read)    Admitted to SDU for further management of AHRF likely 2/2 covid pneumonia    (05 Nov 2024 12:45)      PAST MEDICAL & SURGICAL HISTORY:  3-vessel CAD      HTN (hypertension)      Diabetes mellitus      Hyperlipemia      BPH with obstruction/lower urinary tract symptoms      E-coli UTI      S/P CABG x 3      History of cataract surgery  left eye, Sep 2019            FAMILY HISTORY:  :  No known cardiovacular family hisotry     ROS:  See HPI     Allergies    No Known Allergies    Intolerances          PHYSICAL EXAM    ICU Vital Signs Last 24 Hrs  T(C): 36.3 (06 Nov 2024 00:00), Max: 39.3 (05 Nov 2024 08:43)  T(F): 97.3 (06 Nov 2024 00:00), Max: 102.7 (05 Nov 2024 08:43)  HR: 52 (06 Nov 2024 07:00) (44 - 91)  BP: 106/53 (06 Nov 2024 07:00) (99/51 - 216/91)  BP(mean): 76 (06 Nov 2024 07:00) (74 - 180)  ABP: --  ABP(mean): --  RR: 14 (06 Nov 2024 07:00) (14 - 35)  SpO2: 95% (06 Nov 2024 07:00) (89% - 97%)    O2 Parameters below as of 06 Nov 2024 07:00  Patient On (Oxygen Delivery Method): nasal cannula  O2 Flow (L/min): 4          General: In NAD   HEENT:  HIRAM              Lymphatic system: No cervical LN   Lungs: Bilateral BS, clear   Cardiovascular: Regular  Gastrointestinal: Soft, Positive BS  Musculoskeletal: No clubbing.  Moves all extremities.  Full range of motion   Skin: Warm.  Intact  Neurological: No motor or sensory deficit       11-05-24 @ 07:01  -  11-06-24 @ 07:00  --------------------------------------------------------  IN:    IV PiggyBack: 100 mL    Lactated Ringers: 1135 mL    Oral Fluid: 80 mL  Total IN: 1315 mL    OUT:    Intermittent Catheterization - Urethral (mL): 560 mL    Voided (mL): 300 mL  Total OUT: 860 mL    Total NET: 455 mL          LABS:                          11.0   5.34  )-----------( 75       ( 06 Nov 2024 05:23 )             33.3                                               11-06    142  |  105  |  26[H]  ----------------------------<  243[H]  4.2   |  25  |  0.9    Ca    8.7      06 Nov 2024 05:23  Mg     1.8     11-06    TPro  6.0  /  Alb  3.6  /  TBili  0.6  /  DBili  0.3  /  AST  21  /  ALT  12  /  AlkPhos  89  11-06      PT/INR - ( 06 Nov 2024 06:12 )   PT: 16.60 sec;   INR: 1.40 ratio                                                Urinalysis Basic - ( 06 Nov 2024 05:23 )    Color: x / Appearance: x / SG: x / pH: x  Gluc: 243 mg/dL / Ketone: x  / Bili: x / Urobili: x   Blood: x / Protein: x / Nitrite: x   Leuk Esterase: x / RBC: x / WBC x   Sq Epi: x / Non Sq Epi: x / Bacteria: x                                                  LIVER FUNCTIONS - ( 06 Nov 2024 06:12 )  Alb: 3.6 g/dL / Pro: 6.0 g/dL / ALK PHOS: 89 U/L / ALT: 12 U/L / AST: 21 U/L / GGT: x                                                                                                                                   ABG - ( 05 Nov 2024 12:18 )  pH, Arterial: 7.33  pH, Blood: x     /  pCO2: 44    /  pO2: 62    / HCO3: 23    / Base Excess: -2.8  /  SaO2: 92.4                  MEDICATIONS  (STANDING):  atorvastatin 20 milliGRAM(s) Oral at bedtime  dexAMETHasone  Injectable 6 milliGRAM(s) IV Push daily  dextrose 5%. 1000 milliLiter(s) (100 mL/Hr) IV Continuous <Continuous>  dextrose 5%. 1000 milliLiter(s) (50 mL/Hr) IV Continuous <Continuous>  dextrose 50% Injectable 25 Gram(s) IV Push once  dextrose 50% Injectable 25 Gram(s) IV Push once  dextrose 50% Injectable 12.5 Gram(s) IV Push once  enoxaparin Injectable 80 milliGRAM(s) SubCutaneous every 12 hours  finasteride 5 milliGRAM(s) Oral daily  glucagon  Injectable 1 milliGRAM(s) IntraMuscular once  insulin lispro (ADMELOG) corrective regimen sliding scale   SubCutaneous three times a day before meals  lactated ringers. 1000 milliLiter(s) (70 mL/Hr) IV Continuous <Continuous>  NIFEdipine XL 30 milliGRAM(s) Oral at bedtime  pantoprazole  Injectable 40 milliGRAM(s) IV Push daily  polyethylene glycol 3350 17 Gram(s) Oral daily  remdesivir  IVPB 100 milliGRAM(s) IV Intermittent every 24 hours  remdesivir  IVPB   IV Intermittent   senna 2 Tablet(s) Oral at bedtime  tamsulosin 0.4 milliGRAM(s) Oral at bedtime    MEDICATIONS  (PRN):  dextrose Oral Gel 15 Gram(s) Oral once PRN Blood Glucose LESS THAN 70 milliGRAM(s)/deciliter

## 2024-11-06 NOTE — SWALLOW BEDSIDE ASSESSMENT ADULT - SLP PERTINENT HISTORY OF CURRENT PROBLEM
89-year-old male, past medical history of HTN, HLD, DM, CAD s/p CABG ~10 years ago, Afib on Eliquis, BPH, presents to the ED for worsening cough onset 2 days ago. History obtained from son in law at bedside, patient lethargic and unable to provide history. Patient seen at PMD yesterday and diagnosed with COVID and prescribed Paxlovid.  Today patient was too weak to make it to the bathroom and urinated before getting to the bathroom. Cough +ve for 2 days. SOB on exertion for last 2 days.

## 2024-11-06 NOTE — PHYSICAL THERAPY INITIAL EVALUATION ADULT - PERTINENT HX OF CURRENT PROBLEM, REHAB EVAL
89-year-old male, past medical history of HTN, HLD, DM, CAD s/p CABG ~10 years ago, Afib on Eliquis, BPH, presents to the ED for worsening cough onset 2 days ago. Admitted to SDU for further management of AHRF likely 2/2 COVID PNA

## 2024-11-06 NOTE — CONSULT NOTE ADULT - ASSESSMENT
89-year-old male, past medical history of HTN, HLD, DM, CAD s/p CABG ~10 years ago, Afib on Eliquis, BPH, presents to the ED for worsening cough onset 2 days.    ID is consulted for COVID  Febrile to 102.7  WBC 6.47 > 5.34  On 4L NC  COVID +  UA WBC 6, UCx pending  BCx pending  Procal pending    CXR left basilar atelectasis    Antibiotics:  Remdesivir 11/5      IMPRESSION:  Severe COVID  Acute hypoxemic respiratory failure  Fever  DM  Immunosuppression / Immunosenescence secondary to multiple comorbidities which could result in poor clinical outcome    RECOMMENDATIONS:      * THIS IS AN INCOMPLETE NOTE. FINAL RECOMMENDATION IS PENDING *   89-year-old male, past medical history of HTN, HLD, DM, CAD s/p CABG ~10 years ago, Afib on Eliquis, BPH, presents to the ED for worsening cough onset 2 days.    ID is consulted for COVID  Febrile to 102.7  WBC 6.47 > 5.34  On 4L NC  COVID +  UA WBC 6, UCx pending  BCx pending  Procal 0.45  ESR 17  CRP 92.6  D-dimer 283    CXR left basilar atelectasis    Antibiotics:  Remdesivir 11/5 ->      IMPRESSION:  Severe COVID  Acute hypoxemic respiratory failure  Fever  DM  Immunosuppression / Immunosenescence secondary to multiple comorbidities which could result in poor clinical outcome    RECOMMENDATIONS:  - Low suspicion for superimposed bacterial pneumonia, monitor off abx for now  - Continue IV Remdesivir x 5 days, continue IV/PO Decadron 6mg q24hrs x 10 days; monitor LFTs closely  - Follow up BCx and UCx  - Offloading and frequent position changes, aspiration precaution  - Trend WBC, fever curve, transaminases, creatinine daily      Ambika Landaverde D.O.  Attending Physician  Division of Infectious Diseases  [facility]  Please contact me via [employer] Teams

## 2024-11-06 NOTE — PROGRESS NOTE ADULT - ASSESSMENT
89-year-old male, past medical history of HTN, HLD, DM, CAD s/p CABG ~10 years ago, Afib on Eliquis, BPH, presents to the ED for worsening cough onset 2 days ago. Admitted to SDU for further management of AHRF likely 2/2 COVID PNA.    #AHRF likely 2/2 COVID PNA  - HD stable, on NC now lit  - wbc wnl, fever 102 in ED no further fevers  - ABG: pH 7.33, Co2 44, hco3 23, pao2 62 on 5lit NC, Lactate 3  - CXR: Left basilar atelectasis.  - procal 0.45  - s/p solumedrol 125mg iv x 1  - continue with dexa 6mg qd  - continue with Remdesivir x 5 days  - Follow up ID note, ?needs abc coverage procal 0.45  - Follow up bcx, sputum cx, mrsa    #Hematuria  - ?traumatic per nursing report s/p straight cath in ED  - cbc stable  - bladder scan q6hr, okay to continue Eliquis 2.5mg bid    #TME likely 2/2 covid -->resolved  - CT head (on eliquis, fall 2 days ago from bed no head trauma/syncope/LOC/ENT bleeds per family )  - ABG noted no retention/hypercapnia  - TSH 0.12, free thyroxin pending  - treat covid       #Suspected Subclinical hyperthyroidism   - TSH 0.12, Follow up free thyroxine     #CAD s/p CABG ~10 years ago  - on Eliquis 2.5mg bid, continue   - Lasix 40mg once a week, will hold for now   - metoprolol succinate 25mg qd, continue   - Lipitor 20mg qd, continue when able to take po     #DM  - On metformin 500mg tid + januvia 100mg qd hold  - Monitor FS, ISS for now   - gabapentin 300mg bid will hold given lethargy     #BPH  - Tamsulosin 0.4mg at bedtime AND DUTASTERIDE 0.5MG QD , continue     DVT prophylaxis - on eliquis 2.5mg bid  GI prophylaxis - ppi while on steroids  Dash cc diet  AAT/PT

## 2024-11-06 NOTE — SWALLOW BEDSIDE ASSESSMENT ADULT - SWALLOW EVAL: DIAGNOSIS
Toleration of lunch tray soft & bite sized with thin liquids w/o overt s/s of penetration/aspiration.

## 2024-11-06 NOTE — PROGRESS NOTE ADULT - ASSESSMENT
89-year-old male, past medical history of HTN, HLD, DM, CAD s/p CABG ~10 years ago, paroxysmal Afib on Eliquis, BPH, presents to the ED for worsening cough x  2 days.  Patient seen at PMD yesterday and diagnosed with COVID and prescribed Paxlovid. Patient was too weak to make it to the bathroom and urinated before getting to the bathroom. Cough +ve for 2 days. SOB on exertion for last 2 days.      acute respiratory failure with hypoxemia / COVID     - clinically impred today   - decadron 6mg x 10days   - Remdisivir   - titrate oxygen to maintain pox>90%   - ID consult   - may  resume Eliquis today   - ID consult   - possible DGTF later today

## 2024-11-06 NOTE — PROGRESS NOTE ADULT - SUBJECTIVE AND OBJECTIVE BOX
Patient seen and evaluated this am, now awake and alert on nc oxygen tolerating oral diet      T(F): 98.1 (11-06-24 @ 08:27), Max: 98.6 (11-05-24 @ 12:54)  HR: 52 (11-06-24 @ 07:00)  BP: 106/53 (11-06-24 @ 07:00)  RR: 14 (11-06-24 @ 07:00)  SpO2: 95% (11-06-24 @ 07:00) (91% - 97%)    PHYSICAL EXAM:  GENERAL: NAD  HEAD:  Atraumatic, Normocephalic  EYES: EOMI, PERRLA, conjunctiva and sclera clear  NERVOUS SYSTEM:  Alert & Oriented X3, no focal deficits   CHEST/LUNG:  bilateral rhonchi  HEART: Regular rate and rhythm; No murmurs, rubs, or gallops  ABDOMEN: Soft, Nontender, Nondistended; Bowel sounds present  EXTREMITIES:  2+ Peripheral Pulses, No clubbing, cyanosis, or edema    LABS  11-06    142  |  105  |  26[H]  ----------------------------<  243[H]  4.2   |  25  |  0.9    Ca    8.7      06 Nov 2024 05:23  Mg     1.8     11-06    TPro  6.0  /  Alb  3.6  /  TBili  0.6  /  DBili  0.3  /  AST  21  /  ALT  12  /  AlkPhos  89  11-06                          11.3   8.47  )-----------( 84       ( 06 Nov 2024 11:01 )             34.2       PT/INR - ( 06 Nov 2024 06:12 )   PT: 16.60 sec;   INR: 1.40 ratio         COVID-19 PCR . (11.05.24 @ 11:40)   COVID-19 PCR: Detected      RADIOLOGY  < from: Xray Chest 1 View- PORTABLE-Routine (Xray Chest 1 View- PORTABLE-Routine in AM.) (11.06.24 @ 08:22) >    IMPRESSION:    Left basilar atelectasis.    < end of copied text >    MEDICATIONS  (STANDING):  apixaban 2.5 milliGRAM(s) Oral two times a day  atorvastatin 20 milliGRAM(s) Oral at bedtime  dexAMETHasone  Injectable 6 milliGRAM(s) IV Push daily  finasteride 5 milliGRAM(s) Oral daily  insulin lispro (ADMELOG) corrective regimen sliding scale   SubCutaneous three times a day before meals  NIFEdipine XL 30 milliGRAM(s) Oral at bedtime  pantoprazole  Injectable 40 milliGRAM(s) IV Push daily  polyethylene glycol 3350 17 Gram(s) Oral daily  remdesivir  IVPB 100 milliGRAM(s) IV Intermittent every 24 hours  senna 2 Tablet(s) Oral at bedtime  tamsulosin 0.4 milliGRAM(s) Oral at bedtime    MEDICATIONS  (PRN):  dextrose Oral Gel 15 Gram(s) Oral once PRN Blood Glucose LESS THAN 70 milliGRAM(s)/deciliter

## 2024-11-06 NOTE — SWALLOW BEDSIDE ASSESSMENT ADULT - SWALLOW EVAL: CURRENT DIET
[facility]  History and Physical    Patient ID:  Bashir Trejo  female  1957  [id number]    Admission Date: 8/17/2021  Chief Complaint: Shortness of breath  Reason for Admission: COVID-19 pneumonia and hypoxic respiratory failure  ASSESSMENT & PLAN:    Acute hypoxic respiratory failure  -Patient noted to be satting 85% on room air  -With increased work of breathing and use of accessory muscles  -She is on supplemental oxygen at 4 L  -We will continue oxygen therapy  -Encouraged her to prone and turn  -Up ad adán. -Incentive spirometry  -Wean O2 as tolerated    COVID-19 pneumonia  -Continue antiviral therapies including Decadron and remdesivir  -We will check CRP to see if she is a candidate for Actemra  -Vitamin D  - prone and turned as tolerated  -Clot prophylaxis with Lovenox    Morbid obesity  -Adds complexity to her case given the aggressiveness of COVID-19 and obese patients    Anxietyas needed Ativan    Continue other appropriate home medications  Further work-up and management based on her clinical course  She is high risk for increased morbidity and death given COVID-19 infection and acute respiratory failure    Disposition: Admit to the medical unit  Diet: Adult regular  VTE ppx: Lovenox   GI ppx: Pepcid  CODE STATUS: Full   surrogate decision-maker: Priscilla Patricio at [phone]      HISTORY OF PRESENT ILLNESS:  Patient is a 60 y.o. female with medical h/o hypercholesterolemia, malignant hyperthermia, GERD and depression who presented to [facility] [place] [facility] with shortness of breath found to have O2 sats of 85% with tachypnea (respiratory rate in the 30s) and increased work of breathing with use of accessory muscles. Her  is Covid positive. She did not receive the COVID-19 vaccine. She was found to be Covid positive here with COVID-19 pneumonia and associated acute hypoxic respiratory failure.     Hospitalist service consulted to admit        Allergies   Allergen Reactions    Lortab [Hydrocodone-Acetaminophen] Itching       Prior to Admission Medications   Prescriptions Last Dose Informant Patient Reported? Taking? ARIPiprazole (ABILIFY) 5 mg tablet   No No   Sig: Take 1 Tab by mouth daily. atorvastatin (LIPITOR) 20 mg tablet   No No   Sig: TAKE 1 TABLET BY MOUTH EVERY DAY   buPROPion XL (WELLBUTRIN XL) 300 mg XL tablet   No No   Sig: TAKE 1 TABLET BY MOUTH EVERY DAY IN THE MORNING   dextroamphetamine-amphetamine (ADDERALL) 30 mg tablet   No No   Sig: Take 1 Tablet by mouth two (2) times a day for 30 days. Max Daily Amount: 2 Tablets. Indications: ADD   dextroamphetamine-amphetamine (ADDERALL) 30 mg tablet   No No   Sig: Take 1 Tablet by mouth two (2) times a day for 30 days. Max Daily Amount: 2 Tablets. Indications: ADD   dextroamphetamine-amphetamine (ADDERALL) 30 mg tablet   No No   Sig: Take 1 Tablet by mouth two (2) times a day for 30 days. Max Daily Amount: 2 Tablets. Indications: ADD   diazePAM (VALIUM) 5 mg tablet   No No   Sig: Take 1 Tablet by mouth every six (6) hours as needed for Anxiety. Max Daily Amount: 20 mg. Indications: anxious   ergocalciferol (ERGOCALCIFEROL) 1,250 mcg (50,000 unit) capsule   No No   Sig: TAKE ONE CAPSULE BY MOUTH EVERY WEEK   levothyroxine (SYNTHROID) 25 mcg tablet   No No   Sig: TAKE 2 TABLETS ON EVEN DAYS AND 1 TABLET ON ODD DAYS OF THE MONTH.   omeprazole (PriLOSEC OTC) 20 mg tablet   No No   Sig: Take 1 Tab by mouth daily. zolpidem (AMBIEN) 5 mg tablet   No No   Sig: Take 1 Tab by mouth nightly as needed for Sleep. Max Daily Amount: 5 mg.  Indications: difficulty falling asleep      Facility-Administered Medications: None       Past Medical History:   Diagnosis Date    Depression     Depression 7/8/2015    GERD (gastroesophageal reflux disease)     Hyperhidrosis 1/11/2017    Malignant hyperthermia due to anesthesia     pt Son had MH- No Hx with pt or her family- husbands family had family member that had MH    Moderate single current episode of major depressive disorder ([facility] [address] 2015    Obesity 2015    Open trimalleolar fracture of right ankle 2021    Surgery by Dr Eufemia Gresham on 10/14/20    Other ill-defined conditions(799.89)     hx of anemia - pt states no problems now, thalisemia minor    Screen for colon cancer 6/10/2010    Thyroid disease      Past Surgical History:   Procedure Laterality Date    HX APPENDECTOMY      lap appy    HX HEENT      biopsy of throat    HX ORTHOPAEDIC Right 10/2020    foot    HX WISDOM TEETH EXTRACTION  ? 10 yrs ago    SD COLONOSCOPY FLX DX W/COLLJ SPEC WHEN PFRMD  6/10/2010            Social History     Tobacco Use    Smoking status: Current Some Day Smoker     Packs/day: 0.25     Years: 44.00     Pack years: 11.00     Types: Cigarettes, Pipe     Last attempt to quit: 2020     Years since quittin.5    Smokeless tobacco: Former User     Quit date: 2020   Substance Use Topics    Alcohol use: Yes     Alcohol/week: 0.0 standard drinks     Comment: couple times a week       FAMILY HISTORY:    Family History   Problem Relation Age of Onset    Malignant Hyperthermia Other     Diabetes Mother     Hypertension Mother     Stroke Mother     Alcohol abuse Neg Hx     Arthritis-rheumatoid Neg Hx     Asthma Neg Hx     Bleeding Prob Neg Hx     Cancer Neg Hx     Elevated Lipids Neg Hx     Headache Neg Hx     Heart Disease Neg Hx     Lung Disease Neg Hx     Migraines Neg Hx     Psychiatric Disorder Neg Hx     Mental Retardation Neg Hx     Pseudocholinesterase Deficiency Neg Hx     Delayed Awakening Neg Hx     Post-op Nausea/Vomiting Neg Hx     Emergence Delirium Neg Hx     Post-op Cognitive Dysfunction Neg Hx     Other Neg Hx        REVIEW OF SYSTEMS:  A 14 point review of systems was taken and pertinent positive as per HPI.       PHYSICAL EXAM:    Visit Vitals  /60   Pulse (!) 104   Temp (!) 101.8 °F (38.8 °C)   Resp 26   Ht 5' 5\" (1.651 m)   Wt 102.5 kg (226 lb)   SpO2 92% BMI 37.61 kg/m²       General: Morbidly obese in obvious cardiopulmonary distress. Speaking in short sentences tachypneic with respiratory rate in the 30s and use of accessory muscles of respiration,   HEENT: Pupils equal and reactive to light and accommodation, oropharynx is clear   Neck: Supple, no lymphadenopathy, no JVD   Lungs: Air entry equal bilaterally diminished in the bases. No wheezes or rhonchi or rails  Cardiovascular: Regular rate and rhythm with normal S1 and S2   Abdomen: Soft, nontender, nondistended, normoactive bowel sounds   Extremities: No cyanosis clubbing or edema   Neuro: Nonfocal, A&O x3   Psych: Anxious mood and affect     Intake/Output last 3 shifts:        Labs:  CMP:   Lab Results   Component Value Date/Time     08/17/2021 03:55 AM    K 4.1 08/17/2021 03:55 AM     (H) 08/17/2021 03:55 AM    CO2 27 08/17/2021 03:55 AM    AGAP 5 (L) 08/17/2021 03:55 AM     (H) 08/17/2021 03:55 AM    BUN 10 08/17/2021 03:55 AM    CREA 0.89 08/17/2021 03:55 AM    GFRAA >60 08/17/2021 03:55 AM    GFRNA >60 08/17/2021 03:55 AM    CA 8.2 (L) 08/17/2021 03:55 AM    ALB 3.1 (L) 08/17/2021 03:55 AM    TBILI 0.2 08/17/2021 03:55 AM    TP 6.8 08/17/2021 03:55 AM    GLOB 3.7 (H) 08/17/2021 03:55 AM    AGRAT 0.8 (L) 08/17/2021 03:55 AM    ALT 33 08/17/2021 03:55 AM         CBC:    Lab Results   Component Value Date/Time    WBC 6.7 08/17/2021 03:55 AM    HGB 10.0 (L) 08/17/2021 03:55 AM    HCT 33.0 (L) 08/17/2021 03:55 AM     08/17/2021 03:55 AM       No results found for: INR, PTMR, PTP, PT1, PT2, INREXT    ABG:  No results found for: PH, PHI, PCO2, PCO2I, PO2, PO2I, HCO3, HCO3I, FIO2, FIO2I        No results found for: CPK, RCK1, RCK2, RCK3, RCK4, CKMB, CKNDX, CKND1, TROPT, TROIQ, BNPP, BNP      Imaging & Other Studies:    XR Results (maximum last 3):   Results from [place] encounter on 08/17/21    XR CHEST PORT    Narrative  CHEST X-RAY, single portable view 8/17/2021    History: Fever. Technique: Single frontal view of the chest.    Comparison: None. Findings: The cardiac silhouette is mildly enlarged. The lungs are expanded without  evidence for pneumothorax. Peripheral opacities are seen particularly in the  mid and lower lung field raising concern for infiltrate. No significant  associated pleural effusion is seen. Impression  1. Peripheral infiltrates in the mid and lower lung fields raising concern for  an atypical pneumonia such as Covid 19. Additional inflammatory etiologies can  produce a similar appearance. This report was made using voice transcription. Despite my best efforts to avoid  any, transcription errors may persist. If there is any question about the  accuracy of the report or need for clarification, then please call [phone], or text me through [employer] for clarification or correction. Results from [place] encounter on 10/14/20    XR ANKLE RT MIN 3 V    Narrative  History: Right ankle fracture, repair    EXAM: 4 views right ankle    FINDINGS: 59 seconds fluoroscopy time utilized. There is plate and screw  fixation of a right ankle fracture. The alignment is near-anatomic. Impression  IMPRESSION: Postsurgical change as described. Results from [place] encounter on 10/04/20    XR ANKLE RT MIN 3 V    Narrative  Right ankle series    HISTORY: Pain after trauma. 3 views of the right ankle were obtained. COMPARISON: None. FINDINGS: There is a displaced fracture involving the distal fibular  metadiaphysis with lateral displacement and angulation. There is a comminuted  fracture of the medial malleolus with lateral displacement of the fracture  fragments. There is lateral subluxation of the talus with respect to the tibial  plafond. There is no bony destruction. There is diffuse soft tissue swelling.     Impression  IMPRESSION: Fracture-subluxation of the ankle      CT Results (maximum last 3): Results from [place] encounter on 10/06/20    CT ANKLE RT WO  CONT    Narrative  Clinical History: The Female patient is 66years old  presenting with symptoms  of fall with pain. Technique: Thin section CT images through the right ankle were obtained. To optimally  assess the anatomic relationships of the bones to one another, as well as to  optimally assess the individual osseous structures, multiplanar reformatted  images were also available for review. All CT scans at this facility are performed using dose reduction/dose modulation  techniques, as appropriate the performed exam, including the following:  Automated Exposure Control; Adjustment of the mA and/or kV according to patient  size (this includes techniques or standardized protocols for targeted exams  where dose is matched to indication/reason for exam); and Use of Iterative  Reconstruction Technique. Radiation Exposure Indices:  Reference Air Kerma (Ka,r) = 48 mGy-cm      Comparison:  Right ankle films 10/4/2020. FINDINGS:  A trimalleolar fracture is again demonstrated with approximately 3 to 4 mm of  medial subluxation of the tibia on the talus. There is comminution of the medial  malleolus however with the medial clear space remaining intact. A slightly  comminuted nondisplaced fracture of the posterior margin of the talus is also  noted. An oblique fracture with slight comminution is seen through the distal  fibula with approximately 6 mm of medial displacement of the more proximal  shaft. The lateral clear space also remains intact. There is surrounding soft  tissue swelling. Incidental minimal ventral dorsal calcaneal spurring is seen. Impression  IMPRESSION:  Trimalleolar right ankle fractures as described. .      Results from [place] encounter on 08/28/12    CT  [address]    COMPUTED TOMOGRAPHY    NAME: Taylor Ventura  PT : 1957               SEX: F         MR#: [id number]  LOCATION/NS: CT-                 AGE: 33N      ACCT: [de-identified]  ORDERING: Sade Joyner PT TYPE: Octavia Jenkins  RADIOLOGIST: Ranjan Agarwal MD ([id number])  **Final Report**      ICD Codes / Adm. Diagnosis:    /  Examination:  CT ABD PELVIS W CONTRAST  - [id number] - Aug 28 2012  1:31PM    Reason:  RLQ PAIN, LEUKOCYTOSIS CALL [facility] [phone]    REPORT:  CT ABDOMEN AND PELVIS WITH CONTRAST    HISTORY: Right lower quadrant pain and leukocytosis    Technique: The patient received oral contrast and 125 mL Optiray 350  nonionic IV contrast. Axial images were obtained through the abdomen and  pelvis. COMPARISON: None    FINDINGS: Included portions of the lungs bases are clear. Abdomen: The appendix is dilated with an enhancing mucosa and  periappendiceal inflammation. An appendicolith is present (image 67). There  is no free intraperitoneal air. Enteric contrast is present throughout large  and small bowel. The liver, gallbladder, pancreas, spleen and adrenal glands are normal in  appearance. The kidneys enhance symmetrically and there is no hydronephrosis. Pelvis: The bladder is normal appearance. The uterus is present. There are  no adnexal masses and there is no free pelvic fluid. IMPRESSION: Acute appendicitis. [facility]  The critical results contained in this report were communicated to Dr. Jesus Díaz [facility] nurse by me on 2012 at 1300.                   Signing/Reading Doctor: Ranjan Agarwal MD ([id number])  Approved: Ranjan Agarwal MD ([id number])  2012      Results from [place] encounter on 08    [facility]  Rafael Tanner, [facility]    NAME: Taylor Ventura  PT : 1957               SEX: F         MR#: [id number]  LOCATION/NS: CT-                 AGE: 70K      ACCT: [de-identified]  ORDERING: [facility] PT TYPE: O  RADIOLOGIST: Selvin Be ([id number])      ICD Codes / Adm. Diagnosis:                  /  Examination:  CT CHEST W CONTRAST  - [id number] - Aug  4 2008  2:52PM      CT SCAN OF THE CHEST, 08    Reason:   Pleural effusion. REPORT:  A small right pleural effusion with probable compressive  atelectasis of the adjacent posterior lung base is demonstrated. Bronchi  are intact. Small mediastinal lymph nodes are noted, which the largest is a  pretracheal lymph node measuring 15 mm. The partially visualized liver,  spleen, adrenal glands, kidneys and pancreas are unremarkable. Soft tissues  and bony structures are intact. IMPRESSION:    PROBABLE REACTIVE MEDIASTINAL ADENOPATHY, SMALL RIGHT PLEURAL  EFFUSION AND PROBABLE COMPRESSIVE ATELECTASIS OF THE RIGHT LUNG BASE. Interpreting/Reading Doctor: Selvin Be ([id number])  Transcribed: LW on 2008  Approved: Selvin Be ([id number])  2008        Distribution:  Attending Doctor: Linden Mathews      MRI Results (maximum last 3): No results found for this or any previous visit. Nuclear Medicine Results (maximum last 3): No results found for this or any previous visit. US Results (maximum last 3): Results from [place] encounter on 10/14/11    US DUPLEX LOWER EXT [address]    ULTRASOUND    NAME: Gia Espinoza  PT : 1957               SEX: F         MR#: [id number]  LOCATION/NS: US-                 AGE: 52Y      ACCT: [id number]  [facility]                 PT TYPE: O  RADIOLOGIST: Kezia Kuo MD ([id number])  **Final Report**      ICD Codes / Adm. Diagnosis:    /  Examination:  US VENOUS DUPLEX LEFT LE  - [id number] - Oct 14 2011 12:40PM    Reason:  pain swelling    REPORT:  Left lower extremity venous ultrasound    INDICATION:  Pain and swelling, left lower extremity    Doppler ultrasound of the left lower Soft & bite sized with thin liquids extremity was performed. FINDINGS:  There is normal flow in the common femoral, superficial femoral,  and popliteal veins. Normal compression and augmentation is demonstrated. The proximal calf veins are also patent. The contralateral common femoral veins also patent. There is no evidence of  a Baker's cyst.    IMPRESSION: No evidence of deep venous thrombosis in the left lower  extremity        Signing/Reading Doctor: Lawerence Snellen, MD ([id number])  Approved: Lawerence Snellen, MD ([id number])  10/14/2011      DEXA Results (maximum last 3): No results found for this or any previous visit. DEREK Results (maximum last 3): Results from [place] encounter on 01/31/18    DEREK MAMMO BI SCREENING INCL CAD    Narrative  BILATERAL SCREENING MAMMOGRAM, 1/31/2018. CLINICAL HISTORY: Routine screening mammogram.    Comparison studies:  None, this is a baseline study. FINDINGS:    Bilateral digital screening mammography, interpreted in conjunction with CAD  overread. The breasts are composed of scattered fibroglandular elements. The  axilla contain benign appearing lymph nodes. No evidence of evolving skin  thickening, or nipple retraction is seen. Scattered punctate, and lucent  centered appearing calcifications are seen which are not felt to be worrisome  given their scattered distribution and typically benign appearance. No  worrisome unique clustered microcalcifications are seen. No dominant mass,  spiculated density, or architectural distortion is seen. Impression  IMPRESSION:  1. Baseline mammogram without findings suggestive of malignancy. Therefore,  routine followup is recommended with screening mammogram in 1 year unless  clinically indicated sooner. A reminder letter will be scheduled. We are mailing breast density information to the patient along with the  mammogram report. BI-RADS Assessment Category 2: Benign finding(s). BD2      IR Results (maximum last 3):   No results found for this or any previous visit. VAS/US Results (maximum last 3): No results found for this or any previous visit. PET Results (maximum last 3): No results found for this or any previous visit. EKG Results     Procedure 720 Value Units Date/Time    EKG 12 LEAD INITIAL [[id number]] Collected: 08/17/21 0707    Order Status: Completed Updated: 08/17/21 0731     Ventricular Rate 110 BPM      Atrial Rate 110 BPM      P-R Interval 138 ms      QRS Duration 84 ms      Q-T Interval 322 ms      QTC Calculation (Bezet) 435 ms      Calculated P Axis 58 degrees      Calculated R Axis 74 degrees      Calculated T Axis 70 degrees      Diagnosis --     !! AGE AND GENDER SPECIFIC ECG ANALYSIS !!   Sinus tachycardia  Low voltage QRS  Nonspecific ST abnormality  Abnormal ECG  No previous ECGs available  Confirmed by Kayli Conway ([id number]) on 8/17/2021 7:30:55 AM            Active Problems:  Patient Active Problem List    Diagnosis Date Noted    Respiratory failure with hypoxia ([facility] [address] 08/17/2021    Pneumonia due to COVID-19 virus 08/17/2021    High risk medication use 08/01/2021    Elevated alkaline phosphatase level 07/15/2021    Well adult exam 03/15/2021    History of fracture of right ankle 10/14/2020    Heavy alcohol use 10/05/2020    Refused influenza vaccine 05/20/2020    Attention deficit hyperactivity disorder (ADHD), combined type 01/21/2020    Skin tag 12/28/2019    Light cigarette smoker (1-9 cigarettes per day) 06/28/2019    Controlled substance agreement signed 01/19/2019    Primary insomnia 12/24/2018    Elevated TSH 01/11/2017    IGT (impaired glucose tolerance) 07/08/2015    Generalized anxiety disorder with panic attacks 07/08/2015    Moderate episode of recurrent major depressive disorder ([facility] [address] 07/08/2015    Class 2 obesity due to excess calories without serious comorbidity with body mass index (BMI) of 37.0 to 37.9 in adult 07/08/2015    Thalassemia 07/08/2015    Vitamin D deficiency 07/08/2015    Dyslipidemia 06/22/2015         Mary Ellen Basilio MD  [facility]  8/17/2021 10:31 AM

## 2024-11-07 LAB
ALBUMIN SERPL ELPH-MCNC: 3.7 G/DL — SIGNIFICANT CHANGE UP (ref 3.5–5.2)
ALBUMIN SERPL ELPH-MCNC: 3.7 G/DL — SIGNIFICANT CHANGE UP (ref 3.5–5.2)
ALP SERPL-CCNC: 86 U/L — SIGNIFICANT CHANGE UP (ref 30–115)
ALP SERPL-CCNC: 91 U/L — SIGNIFICANT CHANGE UP (ref 30–115)
ALT FLD-CCNC: 15 U/L — SIGNIFICANT CHANGE UP (ref 0–41)
ALT FLD-CCNC: 17 U/L — SIGNIFICANT CHANGE UP (ref 0–41)
ANION GAP SERPL CALC-SCNC: 16 MMOL/L — HIGH (ref 7–14)
ANION GAP SERPL CALC-SCNC: 17 MMOL/L — HIGH (ref 7–14)
AST SERPL-CCNC: 25 U/L — SIGNIFICANT CHANGE UP (ref 0–41)
AST SERPL-CCNC: 25 U/L — SIGNIFICANT CHANGE UP (ref 0–41)
BASOPHILS # BLD AUTO: 0 K/UL — SIGNIFICANT CHANGE UP (ref 0–0.2)
BASOPHILS NFR BLD AUTO: 0 % — SIGNIFICANT CHANGE UP (ref 0–1)
BILIRUB DIRECT SERPL-MCNC: 0.2 MG/DL — SIGNIFICANT CHANGE UP (ref 0–0.3)
BILIRUB INDIRECT FLD-MCNC: 0.3 MG/DL — SIGNIFICANT CHANGE UP (ref 0.2–1.2)
BILIRUB SERPL-MCNC: 0.5 MG/DL — SIGNIFICANT CHANGE UP (ref 0.2–1.2)
BILIRUB SERPL-MCNC: 0.6 MG/DL — SIGNIFICANT CHANGE UP (ref 0.2–1.2)
BUN SERPL-MCNC: 56 MG/DL — HIGH (ref 10–20)
BUN SERPL-MCNC: 68 MG/DL — CRITICAL HIGH (ref 10–20)
CALCIUM SERPL-MCNC: 8.4 MG/DL — SIGNIFICANT CHANGE UP (ref 8.4–10.5)
CALCIUM SERPL-MCNC: 8.7 MG/DL — SIGNIFICANT CHANGE UP (ref 8.4–10.5)
CHLORIDE SERPL-SCNC: 103 MMOL/L — SIGNIFICANT CHANGE UP (ref 98–110)
CHLORIDE SERPL-SCNC: 99 MMOL/L — SIGNIFICANT CHANGE UP (ref 98–110)
CO2 SERPL-SCNC: 20 MMOL/L — SIGNIFICANT CHANGE UP (ref 17–32)
CO2 SERPL-SCNC: 21 MMOL/L — SIGNIFICANT CHANGE UP (ref 17–32)
CREAT SERPL-MCNC: 1.7 MG/DL — HIGH (ref 0.7–1.5)
CREAT SERPL-MCNC: 2 MG/DL — HIGH (ref 0.7–1.5)
CRP SERPL-MCNC: 50.1 MG/L — HIGH
D DIMER BLD IA.RAPID-MCNC: 170 NG/ML DDU — SIGNIFICANT CHANGE UP
EGFR: 32 ML/MIN/1.73M2 — LOW
EGFR: 39 ML/MIN/1.73M2 — LOW
EOSINOPHIL # BLD AUTO: 0 K/UL — SIGNIFICANT CHANGE UP (ref 0–0.7)
EOSINOPHIL NFR BLD AUTO: 0 % — SIGNIFICANT CHANGE UP (ref 0–8)
ERYTHROCYTE [SEDIMENTATION RATE] IN BLOOD: 19 MM/HR — HIGH (ref 0–10)
GLUCOSE BLDC GLUCOMTR-MCNC: 290 MG/DL — HIGH (ref 70–99)
GLUCOSE BLDC GLUCOMTR-MCNC: 336 MG/DL — HIGH (ref 70–99)
GLUCOSE BLDC GLUCOMTR-MCNC: 376 MG/DL — HIGH (ref 70–99)
GLUCOSE BLDC GLUCOMTR-MCNC: 411 MG/DL — HIGH (ref 70–99)
GLUCOSE SERPL-MCNC: 335 MG/DL — HIGH (ref 70–99)
GLUCOSE SERPL-MCNC: 402 MG/DL — HIGH (ref 70–99)
HCT VFR BLD CALC: 32 % — LOW (ref 42–52)
HCT VFR BLD CALC: 33 % — LOW (ref 42–52)
HGB BLD-MCNC: 10.7 G/DL — LOW (ref 14–18)
HGB BLD-MCNC: 11.1 G/DL — LOW (ref 14–18)
IMM GRANULOCYTES NFR BLD AUTO: 0.6 % — HIGH (ref 0.1–0.3)
INR BLD: 1.39 RATIO — HIGH (ref 0.65–1.3)
LYMPHOCYTES # BLD AUTO: 0.62 K/UL — LOW (ref 1.2–3.4)
LYMPHOCYTES # BLD AUTO: 9.3 % — LOW (ref 20.5–51.1)
MAGNESIUM SERPL-MCNC: 2 MG/DL — SIGNIFICANT CHANGE UP (ref 1.8–2.4)
MCHC RBC-ENTMCNC: 30.1 PG — SIGNIFICANT CHANGE UP (ref 27–31)
MCHC RBC-ENTMCNC: 30.6 PG — SIGNIFICANT CHANGE UP (ref 27–31)
MCHC RBC-ENTMCNC: 33.4 G/DL — SIGNIFICANT CHANGE UP (ref 32–37)
MCHC RBC-ENTMCNC: 33.6 G/DL — SIGNIFICANT CHANGE UP (ref 32–37)
MCV RBC AUTO: 90.1 FL — SIGNIFICANT CHANGE UP (ref 80–94)
MCV RBC AUTO: 90.9 FL — SIGNIFICANT CHANGE UP (ref 80–94)
MONOCYTES # BLD AUTO: 0.37 K/UL — SIGNIFICANT CHANGE UP (ref 0.1–0.6)
MONOCYTES NFR BLD AUTO: 5.6 % — SIGNIFICANT CHANGE UP (ref 1.7–9.3)
NEUTROPHILS # BLD AUTO: 5.63 K/UL — SIGNIFICANT CHANGE UP (ref 1.4–6.5)
NEUTROPHILS NFR BLD AUTO: 84.5 % — HIGH (ref 42.2–75.2)
NRBC # BLD: 0 /100 WBCS — SIGNIFICANT CHANGE UP (ref 0–0)
NRBC # BLD: 0 /100 WBCS — SIGNIFICANT CHANGE UP (ref 0–0)
PLATELET # BLD AUTO: 101 K/UL — LOW (ref 130–400)
PLATELET # BLD AUTO: 84 K/UL — LOW (ref 130–400)
PMV BLD: 13.2 FL — HIGH (ref 7.4–10.4)
PMV BLD: 13.7 FL — HIGH (ref 7.4–10.4)
POTASSIUM SERPL-MCNC: 3.9 MMOL/L — SIGNIFICANT CHANGE UP (ref 3.5–5)
POTASSIUM SERPL-MCNC: 4 MMOL/L — SIGNIFICANT CHANGE UP (ref 3.5–5)
POTASSIUM SERPL-SCNC: 3.9 MMOL/L — SIGNIFICANT CHANGE UP (ref 3.5–5)
POTASSIUM SERPL-SCNC: 4 MMOL/L — SIGNIFICANT CHANGE UP (ref 3.5–5)
PROT SERPL-MCNC: 5.9 G/DL — LOW (ref 6–8)
PROT SERPL-MCNC: 5.9 G/DL — LOW (ref 6–8)
PROTHROM AB SERPL-ACNC: 16.5 SEC — HIGH (ref 9.95–12.87)
RBC # BLD: 3.55 M/UL — LOW (ref 4.7–6.1)
RBC # BLD: 3.63 M/UL — LOW (ref 4.7–6.1)
RBC # FLD: 14 % — SIGNIFICANT CHANGE UP (ref 11.5–14.5)
RBC # FLD: 14.2 % — SIGNIFICANT CHANGE UP (ref 11.5–14.5)
SODIUM SERPL-SCNC: 136 MMOL/L — SIGNIFICANT CHANGE UP (ref 135–146)
SODIUM SERPL-SCNC: 140 MMOL/L — SIGNIFICANT CHANGE UP (ref 135–146)
T4 FREE SERPL-MCNC: 1.1 NG/DL — SIGNIFICANT CHANGE UP (ref 0.9–1.8)
WBC # BLD: 6.66 K/UL — SIGNIFICANT CHANGE UP (ref 4.8–10.8)
WBC # BLD: 8.51 K/UL — SIGNIFICANT CHANGE UP (ref 4.8–10.8)
WBC # FLD AUTO: 6.66 K/UL — SIGNIFICANT CHANGE UP (ref 4.8–10.8)
WBC # FLD AUTO: 8.51 K/UL — SIGNIFICANT CHANGE UP (ref 4.8–10.8)

## 2024-11-07 PROCEDURE — 71045 X-RAY EXAM CHEST 1 VIEW: CPT | Mod: 26

## 2024-11-07 PROCEDURE — 99233 SBSQ HOSP IP/OBS HIGH 50: CPT

## 2024-11-07 PROCEDURE — 99232 SBSQ HOSP IP/OBS MODERATE 35: CPT

## 2024-11-07 PROCEDURE — 76770 US EXAM ABDO BACK WALL COMP: CPT | Mod: 26

## 2024-11-07 RX ORDER — INSULIN LISPRO 100/ML
9 VIAL (ML) SUBCUTANEOUS
Refills: 0 | Status: DISCONTINUED | OUTPATIENT
Start: 2024-11-07 | End: 2024-11-08

## 2024-11-07 RX ORDER — INSULIN GLARGINE,HUM.REC.ANLOG 100/ML
12 VIAL (ML) SUBCUTANEOUS AT BEDTIME
Refills: 0 | Status: DISCONTINUED | OUTPATIENT
Start: 2024-11-07 | End: 2024-11-08

## 2024-11-07 RX ORDER — INSULIN LISPRO 100/ML
7 VIAL (ML) SUBCUTANEOUS
Refills: 0 | Status: DISCONTINUED | OUTPATIENT
Start: 2024-11-07 | End: 2024-11-07

## 2024-11-07 RX ORDER — PANTOPRAZOLE SODIUM 40 MG/1
40 TABLET, DELAYED RELEASE ORAL
Refills: 0 | Status: DISCONTINUED | OUTPATIENT
Start: 2024-11-07 | End: 2024-11-13

## 2024-11-07 RX ADMIN — Medication 6: at 11:52

## 2024-11-07 RX ADMIN — APIXABAN 2.5 MILLIGRAM(S): 5 TABLET, FILM COATED ORAL at 05:26

## 2024-11-07 RX ADMIN — FINASTERIDE 5 MILLIGRAM(S): 5 TABLET, FILM COATED ORAL at 11:52

## 2024-11-07 RX ADMIN — Medication 9 UNIT(S): at 11:54

## 2024-11-07 RX ADMIN — REMDESIVIR 200 MILLIGRAM(S): 100 INJECTION, POWDER, LYOPHILIZED, FOR SOLUTION INTRAVENOUS at 15:52

## 2024-11-07 RX ADMIN — Medication 12 UNIT(S): at 22:48

## 2024-11-07 RX ADMIN — CHLORHEXIDINE GLUCONATE 1 APPLICATION(S): 40 SOLUTION TOPICAL at 11:00

## 2024-11-07 RX ADMIN — Medication 4: at 08:33

## 2024-11-07 RX ADMIN — Medication 75 MILLILITER(S): at 08:34

## 2024-11-07 RX ADMIN — Medication 9 UNIT(S): at 15:51

## 2024-11-07 RX ADMIN — Medication 0.4 MILLIGRAM(S): at 21:07

## 2024-11-07 RX ADMIN — PANTOPRAZOLE SODIUM 40 MILLIGRAM(S): 40 TABLET, DELAYED RELEASE ORAL at 11:52

## 2024-11-07 RX ADMIN — DEXAMETHASONE 1.5 MG 6 MILLIGRAM(S): 1.5 TABLET ORAL at 05:28

## 2024-11-07 RX ADMIN — APIXABAN 2.5 MILLIGRAM(S): 5 TABLET, FILM COATED ORAL at 18:33

## 2024-11-07 RX ADMIN — Medication 7 UNIT(S): at 08:37

## 2024-11-07 RX ADMIN — Medication 30 MILLIGRAM(S): at 21:07

## 2024-11-07 RX ADMIN — Medication 5: at 15:50

## 2024-11-07 RX ADMIN — Medication 1: at 22:47

## 2024-11-07 RX ADMIN — Medication 20 MILLIGRAM(S): at 21:07

## 2024-11-07 NOTE — PROGRESS NOTE ADULT - ASSESSMENT
89-year-old male, past medical history of HTN, HLD, DM, CAD s/p CABG ~10 years ago, Afib on Eliquis, BPH, presents to the ED for worsening cough onset 2 days.    ID is consulted for COVID  Febrile to 102.7  WBC 6.47 > 5.34  On 4L NC  COVID +  UA WBC 6, UCx pending  BCx pending  Procal 0.45  ESR 17  CRP 92.6  D-dimer 283    CXR left basilar atelectasis    Antibiotics:  Remdesivir 11/5 ->      IMPRESSION:  Severe COVID  Acute hypoxemic respiratory failure  DWAIN  DM  Immunosuppression / Immunosenescence secondary to multiple comorbidities which could result in poor clinical outcome    RECOMMENDATIONS:  - Low suspicion for superimposed bacterial pneumonia, monitor off abx for now  - Continue IV Remdesivir x 5 days, continue IV/PO Decadron 6mg q24hrs x 10 days; monitor LFTs closely  - Trend procal, D-dimer, CRP, ferritin q48hrs  - Follow up BCx and UCx  - Offloading and frequent position changes, aspiration precaution  - Trend WBC, fever curve, transaminases, creatinine daily        * THIS IS AN INCOMPLETE NOTE. FINAL RECOMMENDATION IS PENDING *   89-year-old male, past medical history of HTN, HLD, DM, CAD s/p CABG ~10 years ago, Afib on Eliquis, BPH, presents to the ED for worsening cough onset 2 days.    ID is consulted for COVID  Febrile to 102.7  WBC 6.47 > 5.34  On 4L NC  COVID +  UA WBC 6, UCx pending  BCx pending  Procal 0.45  ESR 17  CRP 92.6  D-dimer 283    CXR left basilar atelectasis    Antibiotics:  Remdesivir 11/5 ->      IMPRESSION:  Severe COVID  Acute hypoxemic respiratory failure  DWAIN  DM  Immunosuppression / Immunosenescence secondary to multiple comorbidities which could result in poor clinical outcome    RECOMMENDATIONS:  - Low suspicion for superimposed bacterial pneumonia, monitor off abx for now  - Continue IV Remdesivir x 5 days, continue IV/PO Decadron 6mg q24hrs x 10 days; monitor LFTs closely  - Trend procal, D-dimer, CRP, ferritin q48hrs  - Follow up BCx and UCx  - Offloading and frequent position changes, aspiration precaution  - Trend WBC, fever curve, transaminases, creatinine daily      Ambika Landaverde D.O.  Attending Physician  Division of Infectious Diseases  [facility]  Please contact me via [employer] Teams

## 2024-11-07 NOTE — DIETITIAN INITIAL EVALUATION ADULT - NSICDXPASTSURGICALHX_GEN_ALL_CORE_FT
[FreeTextEntry1] : I have discussed the natural history and treatment options for cervical radiculopathy with the patient. I explained the indications for observation, conservative management, medical management, physical therapy, pain management approaches and surgery. I explained the different types and surgical approaches including anterior and posterior approaches as well as decompression only procedures and instrumented fusions. I discussed the risks, benefits, possible complications and expected outcome related to each treatment option. The risks of surgery were discussed in detail including but not limited to postoperative infection at the surgical site, hospital acquired pneumonia, hospital acquired urinary tract infection, postoperative meningitis, wound dehiscence, CSF leak, stroke (ischemic and hemorrhagic), postoperative seizures, worsening motor function due to spinal cord or nerve injury, postoperative visual deficit which could be permanent (blindness) when surgery is performed in a prone position, cardiovascular complications (MI, PE, DVT) and I also explained that some of these complications could lead to sepsis, coma or even death. I discussed the fact that some of these complications may require subsequent surgical procedure(s) to correct them.  \par In the end, I recommend, ***\par The patient understands the plan of care and is in agreement.  All questions answered to patient satisfaction.\par \par  PAST SURGICAL HISTORY:  History of cataract surgery left eye, Sep 2019    S/P CABG x 3

## 2024-11-07 NOTE — PROGRESS NOTE ADULT - SUBJECTIVE AND OBJECTIVE BOX
Patient seen and evaluated this am, awake and alert, eating breakfast in bed, pox 95% RA, no complaints       T(F): 97.1 (11-06-24 @ 15:01), Max: 97.1 (11-06-24 @ 15:01)  HR: 82 (11-07-24 @ 08:00)  BP: 138/58 (11-07-24 @ 08:00)  RR: 19 (11-07-24 @ 08:00)  SpO2: 95% (11-07-24 @ 08:00) (93% - 98%)    PHYSICAL EXAM:  GENERAL: NAD  HEAD:  Atraumatic, Normocephalic  EYES: EOMI, PERRLA, conjunctiva and sclera clear  NERVOUS SYSTEM:  Alert & Oriented X3, no focal deficits   CHEST/LUNG:  bilateral rhonchi  HEART: Regular rate and rhythm; No murmurs, rubs, or gallops  ABDOMEN: Soft, Nontender, Nondistended; Bowel sounds present  EXTREMITIES:  2+ Peripheral Pulses, No clubbing, cyanosis, or edema    LABS  11-07    140  |  103  |  56[H]  ----------------------------<  335[H]  4.0   |  21  |  1.7[H]    Ca    8.7      07 Nov 2024 05:57  Mg     2.0     11-07    TPro  5.9[L]  /  Alb  3.7  /  TBili  0.6  /  DBili  x   /  AST  25  /  ALT  15  /  AlkPhos  86  11-07                          10.7   6.66  )-----------( 84       ( 07 Nov 2024 05:57 )             32.0       PT/INR - ( 06 Nov 2024 06:12 )   PT: 16.60 sec;   INR: 1.40 ratio          Culture Results:   No growth at 24 hours (11-05-24)    RADIOLOGY  < from: Xray Chest 1 View- PORTABLE-Routine (Xray Chest 1 View- PORTABLE-Routine in AM.) (11.07.24 @ 07:08) >  IMPRESSION:    Bibasilar opacities may reflect atelectasis.    < end of copied text >    MEDICATIONS  (STANDING):  apixaban 2.5 milliGRAM(s) Oral two times a day  atorvastatin 20 milliGRAM(s) Oral at bedtime  chlorhexidine 2% Cloths 1 Application(s) Topical daily  dexAMETHasone  Injectable 6 milliGRAM(s) IV Push daily  finasteride 5 milliGRAM(s) Oral daily  insulin glargine Injectable (LANTUS) 12 Unit(s) SubCutaneous at bedtime  insulin lispro (ADMELOG) corrective regimen sliding scale   SubCutaneous three times a day before meals  insulin lispro (ADMELOG) corrective regimen sliding scale   SubCutaneous at bedtime  insulin lispro Injectable (ADMELOG) 9 Unit(s) SubCutaneous three times a day before meals  lactated ringers. 1000 milliLiter(s) (75 mL/Hr) IV Continuous <Continuous>  NIFEdipine XL 30 milliGRAM(s) Oral at bedtime  pantoprazole    Tablet 40 milliGRAM(s) Oral before breakfast  polyethylene glycol 3350 17 Gram(s) Oral daily  remdesivir  IVPB 100 milliGRAM(s) IV Intermittent every 24 hours  remdesivir  IVPB   IV Intermittent   senna 2 Tablet(s) Oral at bedtime  tamsulosin 0.4 milliGRAM(s) Oral at bedtime    MEDICATIONS  (PRN):  dextrose Oral Gel 15 Gram(s) Oral once PRN Blood Glucose LESS THAN 70 milliGRAM(s)/deciliter

## 2024-11-07 NOTE — DIETITIAN INITIAL EVALUATION ADULT - OTHER INFO
pt is 89 year old male with hx of HTN, DM, CAD, s/p CABG, afib, BPH p/w worsening cough and lethargy . pt found to be COVID +

## 2024-11-07 NOTE — DIETITIAN INITIAL EVALUATION ADULT - PERTINENT MEDS FT
MEDICATIONS  (STANDING):  apixaban 2.5 milliGRAM(s) Oral two times a day  atorvastatin 20 milliGRAM(s) Oral at bedtime  chlorhexidine 2% Cloths 1 Application(s) Topical daily  dexAMETHasone  Injectable 6 milliGRAM(s) IV Push daily  dextrose 5%. 1000 milliLiter(s) (50 mL/Hr) IV Continuous <Continuous>  dextrose 5%. 1000 milliLiter(s) (100 mL/Hr) IV Continuous <Continuous>  finasteride 5 milliGRAM(s) Oral daily  insulin glargine Injectable (LANTUS) 12 Unit(s) SubCutaneous at bedtime  insulin lispro (ADMELOG) corrective regimen sliding scale   SubCutaneous three times a day before meals  insulin lispro (ADMELOG) corrective regimen sliding scale   SubCutaneous at bedtime  insulin lispro Injectable (ADMELOG) 9 Unit(s) SubCutaneous three times a day before meals  lactated ringers. 1000 milliLiter(s) (75 mL/Hr) IV Continuous <Continuous>  NIFEdipine XL 30 milliGRAM(s) Oral at bedtime  pantoprazole    Tablet 40 milliGRAM(s) Oral before breakfast  polyethylene glycol 3350 17 Gram(s) Oral daily  remdesivir  IVPB 100 milliGRAM(s) IV Intermittent every 24 hours   senna 2 Tablet(s) Oral at bedtime  tamsulosin 0.4 milliGRAM(s) Oral at bedtime    MEDICATIONS  (PRN):  dextrose Oral Gel 15 Gram(s) Oral once PRN Blood Glucose LESS THAN 70 milliGRAM(s)/deciliter

## 2024-11-07 NOTE — PROGRESS NOTE ADULT - SUBJECTIVE AND OBJECTIVE BOX
INFECTIOUS DISEASE FOLLOW UP NOTE:    Interval History/ROS: Patient is a 89y old  Male who presents with a chief complaint of sob/cough/weakness (07 Nov 2024 08:24)      Overnight events:    REVIEW OF SYSTEMS:        Prior hospital charts reviewed [Yes]  Primary team notes reviewed [Yes]  Other consultant notes reviewed [Yes]    Allergies:  No Known Allergies      ANTIMICROBIALS:   remdesivir  IVPB    remdesivir  IVPB 100 every 24 hours      OTHER MEDS: MEDICATIONS  (STANDING):  apixaban 2.5 two times a day  atorvastatin 20 at bedtime  dexAMETHasone  Injectable 6 daily  dextrose 50% Injectable 25 once  dextrose 50% Injectable 25 once  dextrose 50% Injectable 12.5 once  dextrose Oral Gel 15 once PRN  finasteride 5 daily  glucagon  Injectable 1 once  insulin glargine Injectable (LANTUS) 7 at bedtime  insulin lispro (ADMELOG) corrective regimen sliding scale  at bedtime  insulin lispro (ADMELOG) corrective regimen sliding scale  three times a day before meals  insulin lispro Injectable (ADMELOG) 7 three times a day before meals  NIFEdipine XL 30 at bedtime  pantoprazole    Tablet 40 before breakfast  polyethylene glycol 3350 17 daily  senna 2 at bedtime  tamsulosin 0.4 at bedtime      Vital Signs Last 24 Hrs  T(F): 97.1 (11-06-24 @ 15:01), Max: 102.7 (11-05-24 @ 08:43)    Vital Signs Last 24 Hrs  HR: 82 (11-07-24 @ 08:00) (64 - 82)  BP: 138/58 (11-07-24 @ 08:00) (111/53 - 141/60)  RR: 19 (11-07-24 @ 08:00)  SpO2: 95% (11-07-24 @ 08:00) (93% - 98%)  Wt(kg): --    EXAM:      Labs:                        10.7   6.66  )-----------( 84       ( 07 Nov 2024 05:57 )             32.0     11-07    140  |  103  |  56[H]  ----------------------------<  335[H]  4.0   |  21  |  1.7[H]    Ca    8.7      07 Nov 2024 05:57  Mg     2.0     11-07    TPro  5.9[L]  /  Alb  3.7  /  TBili  0.6  /  DBili  x   /  AST  25  /  ALT  15  /  AlkPhos  86  11-07      WBC Trend:  WBC Count: 6.66 (11-07-24 @ 05:57)  WBC Count: 9.37 (11-06-24 @ 19:07)  WBC Count: 8.47 (11-06-24 @ 11:01)  WBC Count: 5.34 (11-06-24 @ 05:23)      Creatine Trend:  Creatinine: 1.7 (11-07)  Creatinine: 0.9 (11-06)  Creatinine: 1.2 (11-05)  Creatinine: 1.1 (11-05)      Liver Biochemical Testing Trend:  Alanine Aminotransferase (ALT/SGPT): 15 (11-07)  Alanine Aminotransferase (ALT/SGPT): 12 (11-06)  Alanine Aminotransferase (ALT/SGPT): 11 (11-06)  Alanine Aminotransferase (ALT/SGPT): 12 (11-05)  Alanine Aminotransferase (ALT/SGPT): 14 (11-05)  Aspartate Aminotransferase (AST/SGOT): 25 (11-07-24 @ 05:57)  Aspartate Aminotransferase (AST/SGOT): 21 (11-06-24 @ 06:12)  Aspartate Aminotransferase (AST/SGOT): 21 (11-06-24 @ 05:23)  Aspartate Aminotransferase (AST/SGOT): 20 (11-05-24 @ 19:49)  Aspartate Aminotransferase (AST/SGOT): 24 (11-05-24 @ 08:50)  Bilirubin Total: 0.6 (11-07)  Bilirubin Direct: 0.3 (11-06)  Bilirubin Total: 0.6 (11-06)  Bilirubin Total: 0.7 (11-06)  Bilirubin Direct: 0.3 (11-05)      Trend LDH      Urinalysis Basic - ( 07 Nov 2024 05:57 )    Color: x / Appearance: x / SG: x / pH: x  Gluc: 335 mg/dL / Ketone: x  / Bili: x / Urobili: x   Blood: x / Protein: x / Nitrite: x   Leuk Esterase: x / RBC: x / WBC x   Sq Epi: x / Non Sq Epi: x / Bacteria: x        MICROBIOLOGY:    Culture - Blood (collected 05 Nov 2024 15:54)  Source: .Blood BLOOD  Preliminary Report:    No growth at 24 hours    COVID-19 PCR: Detected (11-05-24 @ 11:40)      Procalcitonin: 0.45 (11-05)    C-Reactive Protein: 92.6 (11-06)    Ferritin: 562 (11-06)    D-Dimer Assay, Quantitative: 283 (11-06)    Lactate, Blood: 1.5 (11-06 @ 05:23)  Lactate, Blood: 3.0 (11-05 @ 19:49)  Lactate, Blood: 3.6 (11-05 @ 15:54)  Blood Gas Arterial, Lactate: 3.0 (11-05 @ 12:18)      RADIOLOGY:  imaging below personally reviewed    < from: Xray Chest 1 View- PORTABLE-Routine (Xray Chest 1 View- PORTABLE-Routine in AM.) (11.06.24 @ 08:22) >  IMPRESSION:    Left basilar atelectasis.    < end of copied text >   INFECTIOUS DISEASE FOLLOW UP NOTE:    Interval History/ROS: Patient is a 89y old  Male who presents with a chief complaint of sob/cough/weakness (07 Nov 2024 08:24)    Overnight events: No overnight event. Off NC now. Still having some intermittent dry cough.    REVIEW OF SYSTEMS:  CONSTITUTIONAL: No fever or chills; feels cold  HEAD: No lesion on scalp  EYES: No visual disturbance  ENT: No sore throat  RESPIRATORY: + cough, + shortness of breath  CARDIOVASCULAR: No chest pain or palpitations  GASTROINTESTINAL: No abdominal or epigastric pain  GENITOURINARY: No dysuria  NEUROLOGICAL: No headache/dizziness  MUSCULOSKELETAL: No joint pain, erythema, or swelling; no back pain  SKIN: No itching, rashes  All other ROS negative except noted above      Prior hospital charts reviewed [Yes]  Primary team notes reviewed [Yes]  Other consultant notes reviewed [Yes]    Allergies:  No Known Allergies      ANTIMICROBIALS:   remdesivir  IVPB    remdesivir  IVPB 100 every 24 hours      OTHER MEDS: MEDICATIONS  (STANDING):  apixaban 2.5 two times a day  atorvastatin 20 at bedtime  dexAMETHasone  Injectable 6 daily  dextrose 50% Injectable 25 once  dextrose 50% Injectable 25 once  dextrose 50% Injectable 12.5 once  dextrose Oral Gel 15 once PRN  finasteride 5 daily  glucagon  Injectable 1 once  insulin glargine Injectable (LANTUS) 7 at bedtime  insulin lispro (ADMELOG) corrective regimen sliding scale  at bedtime  insulin lispro (ADMELOG) corrective regimen sliding scale  three times a day before meals  insulin lispro Injectable (ADMELOG) 7 three times a day before meals  NIFEdipine XL 30 at bedtime  pantoprazole    Tablet 40 before breakfast  polyethylene glycol 3350 17 daily  senna 2 at bedtime  tamsulosin 0.4 at bedtime      Vital Signs Last 24 Hrs  T(F): 97.1 (11-06-24 @ 15:01), Max: 102.7 (11-05-24 @ 08:43)    Vital Signs Last 24 Hrs  HR: 82 (11-07-24 @ 08:00) (64 - 82)  BP: 138/58 (11-07-24 @ 08:00) (111/53 - 141/60)  RR: 19 (11-07-24 @ 08:00)  SpO2: 95% (11-07-24 @ 08:00) (93% - 98%)  Wt(kg): --    EXAM:  GENERAL: NAD, lying in bed  HEAD: No head lesions  EYES: Conjunctiva pink and cornea white  EAR, NOSE, MOUTH, THROAT: Normal external ears and nose, no discharges; moist mucous membranes; + NC  NECK: Supple, nontender to palpation; no JVD  RESPIRATORY: Clear to auscultation bilaterally  CARDIOVASCULAR: S1 S2  GASTROINTESTINAL: Soft, nontender, nondistended; normoactive bowel sounds  GENITOURINARY: No tomlin catheter, no CVA tenderness  EXTREMITIES: No clubbing, cyanosis, or petal edema  NERVOUS SYSTEM: Alert and oriented to person, time, place and situation, speech clear. No focal deficits   MUSCULOSKELETAL: No joint erythema, swelling or pain  SKIN: No rashes or lesions, no superficial thrombophlebitis  PSYCH: Normal affect    Labs:                        10.7   6.66  )-----------( 84       ( 07 Nov 2024 05:57 )             32.0     11-07    140  |  103  |  56[H]  ----------------------------<  335[H]  4.0   |  21  |  1.7[H]    Ca    8.7      07 Nov 2024 05:57  Mg     2.0     11-07    TPro  5.9[L]  /  Alb  3.7  /  TBili  0.6  /  DBili  x   /  AST  25  /  ALT  15  /  AlkPhos  86  11-07      WBC Trend:  WBC Count: 6.66 (11-07-24 @ 05:57)  WBC Count: 9.37 (11-06-24 @ 19:07)  WBC Count: 8.47 (11-06-24 @ 11:01)  WBC Count: 5.34 (11-06-24 @ 05:23)      Creatine Trend:  Creatinine: 1.7 (11-07)  Creatinine: 0.9 (11-06)  Creatinine: 1.2 (11-05)  Creatinine: 1.1 (11-05)      Liver Biochemical Testing Trend:  Alanine Aminotransferase (ALT/SGPT): 15 (11-07)  Alanine Aminotransferase (ALT/SGPT): 12 (11-06)  Alanine Aminotransferase (ALT/SGPT): 11 (11-06)  Alanine Aminotransferase (ALT/SGPT): 12 (11-05)  Alanine Aminotransferase (ALT/SGPT): 14 (11-05)  Aspartate Aminotransferase (AST/SGOT): 25 (11-07-24 @ 05:57)  Aspartate Aminotransferase (AST/SGOT): 21 (11-06-24 @ 06:12)  Aspartate Aminotransferase (AST/SGOT): 21 (11-06-24 @ 05:23)  Aspartate Aminotransferase (AST/SGOT): 20 (11-05-24 @ 19:49)  Aspartate Aminotransferase (AST/SGOT): 24 (11-05-24 @ 08:50)  Bilirubin Total: 0.6 (11-07)  Bilirubin Direct: 0.3 (11-06)  Bilirubin Total: 0.6 (11-06)  Bilirubin Total: 0.7 (11-06)  Bilirubin Direct: 0.3 (11-05)      Trend LDH      Urinalysis Basic - ( 07 Nov 2024 05:57 )    Color: x / Appearance: x / SG: x / pH: x  Gluc: 335 mg/dL / Ketone: x  / Bili: x / Urobili: x   Blood: x / Protein: x / Nitrite: x   Leuk Esterase: x / RBC: x / WBC x   Sq Epi: x / Non Sq Epi: x / Bacteria: x        MICROBIOLOGY:    Culture - Blood (collected 05 Nov 2024 15:54)  Source: .Blood BLOOD  Preliminary Report:    No growth at 24 hours    COVID-19 PCR: Detected (11-05-24 @ 11:40)      Procalcitonin: 0.45 (11-05)    C-Reactive Protein: 92.6 (11-06)    Ferritin: 562 (11-06)    D-Dimer Assay, Quantitative: 283 (11-06)    Lactate, Blood: 1.5 (11-06 @ 05:23)  Lactate, Blood: 3.0 (11-05 @ 19:49)  Lactate, Blood: 3.6 (11-05 @ 15:54)  Blood Gas Arterial, Lactate: 3.0 (11-05 @ 12:18)      RADIOLOGY:  imaging below personally reviewed    < from: Xray Chest 1 View- PORTABLE-Routine (Xray Chest 1 View- PORTABLE-Routine in AM.) (11.06.24 @ 08:22) >  IMPRESSION:    Left basilar atelectasis.    < end of copied text >   INFECTIOUS DISEASE FOLLOW UP NOTE:    Interval History/ROS: Patient is a 89y old  Male who presents with a chief complaint of sob/cough/weakness (07 Nov 2024 08:24)    Overnight events: No overnight event. Off NC now. Still having some intermittent dry cough.    REVIEW OF SYSTEMS:  CONSTITUTIONAL: No fever or chills; feels cold  HEAD: No lesion on scalp  EYES: No visual disturbance  ENT: No sore throat  RESPIRATORY: + cough, + shortness of breath  CARDIOVASCULAR: No chest pain or palpitations  GASTROINTESTINAL: No abdominal or epigastric pain  GENITOURINARY: No dysuria  NEUROLOGICAL: No headache/dizziness  MUSCULOSKELETAL: No joint pain, erythema, or swelling; no back pain  SKIN: No itching, rashes  All other ROS negative except noted above      Prior hospital charts reviewed [Yes]  Primary team notes reviewed [Yes]  Other consultant notes reviewed [Yes]    Allergies:  No Known Allergies      ANTIMICROBIALS:   remdesivir  IVPB    remdesivir  IVPB 100 every 24 hours      OTHER MEDS: MEDICATIONS  (STANDING):  apixaban 2.5 two times a day  atorvastatin 20 at bedtime  dexAMETHasone  Injectable 6 daily  dextrose 50% Injectable 25 once  dextrose 50% Injectable 25 once  dextrose 50% Injectable 12.5 once  dextrose Oral Gel 15 once PRN  finasteride 5 daily  glucagon  Injectable 1 once  insulin glargine Injectable (LANTUS) 7 at bedtime  insulin lispro (ADMELOG) corrective regimen sliding scale  at bedtime  insulin lispro (ADMELOG) corrective regimen sliding scale  three times a day before meals  insulin lispro Injectable (ADMELOG) 7 three times a day before meals  NIFEdipine XL 30 at bedtime  pantoprazole    Tablet 40 before breakfast  polyethylene glycol 3350 17 daily  senna 2 at bedtime  tamsulosin 0.4 at bedtime      Vital Signs Last 24 Hrs  T(F): 97.1 (11-06-24 @ 15:01), Max: 102.7 (11-05-24 @ 08:43)    Vital Signs Last 24 Hrs  HR: 82 (11-07-24 @ 08:00) (64 - 82)  BP: 138/58 (11-07-24 @ 08:00) (111/53 - 141/60)  RR: 19 (11-07-24 @ 08:00)  SpO2: 95% (11-07-24 @ 08:00) (93% - 98%)  Wt(kg): --    EXAM:  GENERAL: NAD, lying in bed  HEAD: No head lesions  EYES: Conjunctiva pink and cornea white  EAR, NOSE, MOUTH, THROAT: Normal external ears and nose, no discharges; moist mucous membranes  NECK: Supple, nontender to palpation; no JVD  RESPIRATORY: Clear to auscultation bilaterally  CARDIOVASCULAR: S1 S2  GASTROINTESTINAL: Soft, nontender, nondistended; normoactive bowel sounds  GENITOURINARY: No tomlin catheter, no CVA tenderness  EXTREMITIES: No clubbing, cyanosis, or petal edema  NERVOUS SYSTEM: Alert and oriented to person, time, place and situation, speech clear. No focal deficits   MUSCULOSKELETAL: No joint erythema, swelling or pain  SKIN: No rashes or lesions, no superficial thrombophlebitis  PSYCH: Normal affect    Labs:                        10.7   6.66  )-----------( 84       ( 07 Nov 2024 05:57 )             32.0     11-07    140  |  103  |  56[H]  ----------------------------<  335[H]  4.0   |  21  |  1.7[H]    Ca    8.7      07 Nov 2024 05:57  Mg     2.0     11-07    TPro  5.9[L]  /  Alb  3.7  /  TBili  0.6  /  DBili  x   /  AST  25  /  ALT  15  /  AlkPhos  86  11-07      WBC Trend:  WBC Count: 6.66 (11-07-24 @ 05:57)  WBC Count: 9.37 (11-06-24 @ 19:07)  WBC Count: 8.47 (11-06-24 @ 11:01)  WBC Count: 5.34 (11-06-24 @ 05:23)      Creatine Trend:  Creatinine: 1.7 (11-07)  Creatinine: 0.9 (11-06)  Creatinine: 1.2 (11-05)  Creatinine: 1.1 (11-05)      Liver Biochemical Testing Trend:  Alanine Aminotransferase (ALT/SGPT): 15 (11-07)  Alanine Aminotransferase (ALT/SGPT): 12 (11-06)  Alanine Aminotransferase (ALT/SGPT): 11 (11-06)  Alanine Aminotransferase (ALT/SGPT): 12 (11-05)  Alanine Aminotransferase (ALT/SGPT): 14 (11-05)  Aspartate Aminotransferase (AST/SGOT): 25 (11-07-24 @ 05:57)  Aspartate Aminotransferase (AST/SGOT): 21 (11-06-24 @ 06:12)  Aspartate Aminotransferase (AST/SGOT): 21 (11-06-24 @ 05:23)  Aspartate Aminotransferase (AST/SGOT): 20 (11-05-24 @ 19:49)  Aspartate Aminotransferase (AST/SGOT): 24 (11-05-24 @ 08:50)  Bilirubin Total: 0.6 (11-07)  Bilirubin Direct: 0.3 (11-06)  Bilirubin Total: 0.6 (11-06)  Bilirubin Total: 0.7 (11-06)  Bilirubin Direct: 0.3 (11-05)      Trend LDH      Urinalysis Basic - ( 07 Nov 2024 05:57 )    Color: x / Appearance: x / SG: x / pH: x  Gluc: 335 mg/dL / Ketone: x  / Bili: x / Urobili: x   Blood: x / Protein: x / Nitrite: x   Leuk Esterase: x / RBC: x / WBC x   Sq Epi: x / Non Sq Epi: x / Bacteria: x        MICROBIOLOGY:    Culture - Blood (collected 05 Nov 2024 15:54)  Source: .Blood BLOOD  Preliminary Report:    No growth at 24 hours    COVID-19 PCR: Detected (11-05-24 @ 11:40)      Procalcitonin: 0.45 (11-05)    C-Reactive Protein: 92.6 (11-06)    Ferritin: 562 (11-06)    D-Dimer Assay, Quantitative: 283 (11-06)    Lactate, Blood: 1.5 (11-06 @ 05:23)  Lactate, Blood: 3.0 (11-05 @ 19:49)  Lactate, Blood: 3.6 (11-05 @ 15:54)  Blood Gas Arterial, Lactate: 3.0 (11-05 @ 12:18)      RADIOLOGY:  imaging below personally reviewed    < from: Xray Chest 1 View- PORTABLE-Routine (Xray Chest 1 View- PORTABLE-Routine in AM.) (11.06.24 @ 08:22) >  IMPRESSION:    Left basilar atelectasis.    < end of copied text >

## 2024-11-07 NOTE — DIETITIAN INITIAL EVALUATION ADULT - PERTINENT LABORATORY DATA
11-07    136  |  99  |  68[HH]  ----------------------------<  402[H]  3.9   |  20  |  2.0[H]    Ca    8.4      07 Nov 2024 15:47  Mg     2.0     11-07    TPro  5.9[L]  /  Alb  3.7  /  TBili  0.5  /  DBili  0.2  /  AST  25  /  ALT  17  /  AlkPhos  91  11-07  POCT Blood Glucose.: 376 mg/dL (11-07-24 @ 15:45)  A1C with Estimated Average Glucose Result: 7.5 % (11-06-24 @ 05:23)

## 2024-11-07 NOTE — PROGRESS NOTE ADULT - SUBJECTIVE AND OBJECTIVE BOX
CHAPINCITO YBARRA 89y Male  MRN#: [id number]   CODE STATUS:________    Hospital Day: 2d    Pt is currently admitted with the primary diagnosis of COVID-19 PNA      Present Today:   - Chong:  No [  ], Yes [   ] : Indication:     - Type of IV Access:       .. CVC/Piccline:  No [  ], Yes [   ] : Indication:       .. Midline: No [  ], Yes [   ] : Indication:                                             ----------------------------------------------------------  OBJECTIVE  PAST MEDICAL & SURGICAL HISTORY  3-vessel CAD    HTN (hypertension)    Diabetes mellitus    Hyperlipemia    BPH with obstruction/lower urinary tract symptoms    E-coli UTI    S/P CABG x 3    History of cataract surgery  left eye, Sep 2019                                              -----------------------------------------------------------  ALLERGIES:  No Known Allergies                                            ------------------------------------------------------------    HOME MEDICATIONS  Home Medications:  atorvastatin 20 mg oral tablet: 1 tab(s) orally once a day (05 Nov 2024 09:05)  Colace 100 mg oral capsule: 1 cap(s) orally 3 times a day (05 Nov 2024 09:05)  dutasteride 0.5 mg oral capsule: 1 cap(s) orally once a day (05 Nov 2024 09:05)  Eliquis 2.5 mg oral tablet: 1 tab(s) orally 2 times a day (05 Nov 2024 09:05)  folic acid 1 mg oral tablet: 1 tab(s) orally once a day (05 Nov 2024 09:05)  gabapentin 300 mg oral tablet: 1 tab(s) orally 3 times a day (05 Nov 2024 09:05)  Januvia 100 mg oral tablet: 1 tab(s) orally once a day (05 Nov 2024 09:05)  Lasix 40 mg oral tablet: 1 tab(s) orally once a day once a week (05 Nov 2024 09:04)  metFORMIN 500 mg oral tablet: 1 tab(s) orally 2 times a day (05 Nov 2024 09:05)  metoprolol succinate 25 mg oral tablet, extended release: 1 tab(s) orally once a day (05 Nov 2024 09:05)  tamsulosin 0.4 mg oral capsule: 1 cap(s) orally once a day (05 Nov 2024 09:05)                           MEDICATIONS:  STANDING MEDICATIONS  apixaban 2.5 milliGRAM(s) Oral two times a day  atorvastatin 20 milliGRAM(s) Oral at bedtime  chlorhexidine 2% Cloths 1 Application(s) Topical daily  dexAMETHasone  Injectable 6 milliGRAM(s) IV Push daily  dextrose 5%. 1000 milliLiter(s) IV Continuous <Continuous>  dextrose 5%. 1000 milliLiter(s) IV Continuous <Continuous>  dextrose 50% Injectable 25 Gram(s) IV Push once  dextrose 50% Injectable 12.5 Gram(s) IV Push once  dextrose 50% Injectable 25 Gram(s) IV Push once  finasteride 5 milliGRAM(s) Oral daily  glucagon  Injectable 1 milliGRAM(s) IntraMuscular once  insulin glargine Injectable (LANTUS) 12 Unit(s) SubCutaneous at bedtime  insulin lispro (ADMELOG) corrective regimen sliding scale   SubCutaneous three times a day before meals  insulin lispro (ADMELOG) corrective regimen sliding scale   SubCutaneous at bedtime  insulin lispro Injectable (ADMELOG) 7 Unit(s) SubCutaneous three times a day before meals  lactated ringers. 1000 milliLiter(s) IV Continuous <Continuous>  NIFEdipine XL 30 milliGRAM(s) Oral at bedtime  pantoprazole    Tablet 40 milliGRAM(s) Oral before breakfast  polyethylene glycol 3350 17 Gram(s) Oral daily  remdesivir  IVPB 100 milliGRAM(s) IV Intermittent every 24 hours  remdesivir  IVPB   IV Intermittent   senna 2 Tablet(s) Oral at bedtime  tamsulosin 0.4 milliGRAM(s) Oral at bedtime    PRN MEDICATIONS  dextrose Oral Gel 15 Gram(s) Oral once PRN                                            ------------------------------------------------------------  VITAL SIGNS: Last 24 Hours  T(C): 36.2 (06 Nov 2024 15:01), Max: 36.2 (06 Nov 2024 15:01)  T(F): 97.1 (06 Nov 2024 15:01), Max: 97.1 (06 Nov 2024 15:01)  HR: 82 (07 Nov 2024 08:00) (64 - 82)  BP: 138/58 (07 Nov 2024 08:00) (111/53 - 141/60)  BP(mean): 84 (07 Nov 2024 08:00) (77 - 97)  RR: 19 (07 Nov 2024 08:00) (19 - 65)  SpO2: 95% (07 Nov 2024 08:00) (93% - 98%)      11-06-24 @ 07:01  -  11-07-24 @ 07:00  --------------------------------------------------------  IN: 380 mL / OUT: 400 mL / NET: -20 mL    11-07-24 @ 07:01  -  11-07-24 @ 11:28  --------------------------------------------------------  IN: 500 mL / OUT: 0 mL / NET: 500 mL                                             --------------------------------------------------------------  LABS:                        10.7   6.66  )-----------( 84       ( 07 Nov 2024 05:57 )             32.0     11-07    140  |  103  |  56[H]  ----------------------------<  335[H]  4.0   |  21  |  1.7[H]    Ca    8.7      07 Nov 2024 05:57  Mg     2.0     11-07    TPro  5.9[L]  /  Alb  3.7  /  TBili  0.6  /  DBili  x   /  AST  25  /  ALT  15  /  AlkPhos  86  11-07    PT/INR - ( 06 Nov 2024 06:12 )   PT: 16.60 sec;   INR: 1.40 ratio           Urinalysis Basic - ( 07 Nov 2024 05:57 )    Color: x / Appearance: x / SG: x / pH: x  Gluc: 335 mg/dL / Ketone: x  / Bili: x / Urobili: x   Blood: x / Protein: x / Nitrite: x   Leuk Esterase: x / RBC: x / WBC x   Sq Epi: x / Non Sq Epi: x / Bacteria: x      ABG - ( 05 Nov 2024 12:18 )  pH, Arterial: 7.33  pH, Blood: x     /  pCO2: 44    /  pO2: 62    / HCO3: 23    / Base Excess: -2.8  /  SaO2: 92.4                    Culture - Blood (collected 05 Nov 2024 15:54)  Source: .Blood BLOOD  Preliminary Report (06 Nov 2024 22:01):    No growth at 24 hours                                                    -------------------------------------------------------------  RADIOLOGY:                                            --------------------------------------------------------------    PHYSICAL EXAM:  Gen: NAD, non-toxic, conversational  Eyes: PERRLA, EOMI   HENT: Normocephalic, atraumatic. External ears normal, no rhinorrhea, moist mucous membranes.   CV: RRR, no M/R/G  Resp: CTAB, non-labored, speaking without difficulty on room air  Abd: soft, non tender, non rigid, no guarding or rebound tenderness  Back: No CVAT bilaterally, no midline ttp  Skin: dry, wwp   Neuro: AOx3, speech is fluent and appropriate

## 2024-11-07 NOTE — PROGRESS NOTE ADULT - ASSESSMENT
89-year-old male, past medical history of HTN, HLD, DM, CAD s/p CABG ~10 years ago, Afib on Eliquis, BPH, presents to the ED for worsening cough onset 2 days ago. Admitted to SDU for further management of AHRF likely 2/2 COVID PNA.     #AHRF likely 2/2 COVID PNA  - HD stable, on RA now  - wbc wnl, fever 102 in ED no further fevers  - ABG: pH 7.33, Co2 44, hco3 23, pao2 62 on 5lit NC, Lactate 3  - CXR: Left basilar atelectasis.  - procal 0.45  - Bcx NGTD  - s/p solumedrol 125mg iv x 1  - continue with dexa 6mg qd  - continue with Remdesivir x 5 days  - ID following no need fo abx coverage, repeat inflammatory markers in 48 hrs    DWAIN prerenal vs post  - Cr 1.7, BUN 56  - Urine studies  - Gentle hydration, repeat bmp, monitor UO  - Kidney bladder US    #Hematuria-->Improving   - ?traumatic per nursing report s/p straight cath in ED  - cbc stable  - bladder scan q6hr, okay to continue Eliquis 2.5mg bid    #TME likely 2/2 covid -->resolved  - CT head (on eliquis, fall 2 days ago from bed no head trauma/syncope/LOC/ENT bleeds per family )  - ABG noted no retention/hypercapnia  - TSH 0.12, free thyroxin pending  - treat covid       #Suspected Subclinical hyperthyroidism   - TSH 0.12, Follow up free thyroxine     #CAD s/p CABG ~10 years ago  - on Eliquis 2.5mg bid, continue   - Lasix 40mg once a week, will hold for now   - metoprolol succinate 25mg qd, continue   - Lipitor 20mg qd, continue when able to take po     #DM  - On metformin 500mg tid + januvia 100mg qd hold  - Monitor FS, ISS for now   - gabapentin 300mg bid will hold given lethargy     #BPH  - Tamsulosin 0.4mg at bedtime AND DUTASTERIDE 0.5MG QD , continue     DVT prophylaxis - on eliquis 2.5mg bid  GI prophylaxis - ppi while on steroids  Dash cc diet  AAT/PT  Dispo: DGTF

## 2024-11-07 NOTE — PHARMACOTHERAPY INTERVENTION NOTE - COMMENTS
Patient glucose levels in the past 24 hours, average to ~340 mg/dL. Recommend to increase dose of insulin glargine at bedtime and lispro three times before each meal
Patient able to tolerate PO medications. Recommend to switch pantoprazole IV to PO

## 2024-11-07 NOTE — PROGRESS NOTE ADULT - SUBJECTIVE AND OBJECTIVE BOX
Patient is a 89y old  Male who presents with a chief complaint of sob/cough/weakness (06 Nov 2024 13:32)        Over Night Events: on 2 L NC. Afebrile. MS back to baseline      ROS:     All ROS are negative except HPI         PHYSICAL EXAM    ICU Vital Signs Last 24 Hrs  T(C): 36.2 (06 Nov 2024 15:01), Max: 36.7 (06 Nov 2024 08:27)  T(F): 97.1 (06 Nov 2024 15:01), Max: 98.1 (06 Nov 2024 08:27)  HR: 64 (07 Nov 2024 04:00) (64 - 71)  BP: 134/71 (07 Nov 2024 04:00) (111/53 - 141/60)  BP(mean): 97 (07 Nov 2024 04:00) (77 - 97)  ABP: --  ABP(mean): --  RR: 20 (07 Nov 2024 04:00) (20 - 65)  SpO2: 98% (07 Nov 2024 04:00) (93% - 98%)    O2 Parameters below as of 07 Nov 2024 04:00  Patient On (Oxygen Delivery Method): nasal cannula  O2 Flow (L/min): 2        CONSTITUTIONAL:  Well nourished.  NAD    ENT:   Airway patent,   Mouth with normal mucosa.   No thrush    EYES:   Pupils equal,   Round and reactive to light.    CARDIAC:   Normal rate,   Regular rhythm.    No edema      Vascular:  Normal systolic impulse  No Carotid bruits    RESPIRATORY:   No wheezing  Bilateral BS  Normal chest expansion  Not tachypneic,  No use of accessory muscles    GASTROINTESTINAL:  Abdomen soft,   Non-tender,   No guarding,   + BS    MUSCULOSKELETAL:   Range of motion is not limited,  No clubbing, cyanosis    NEUROLOGICAL:   Alert and oriented   No motor  deficits.    SKIN:   Skin normal color for race,   Warm and dry and intact.   No evidence of rash.    PSYCHIATRIC:   Normal mood and affect.   No apparent risk to self or others.    HEMATOLOGICAL:  No cervical  lymphadenopathy.  no inguinal lymphadenopathy      11-06-24 @ 07:01  -  11-07-24 @ 07:00  --------------------------------------------------------  IN:    IV PiggyBack: 100 mL    Lactated Ringers: 280 mL  Total IN: 380 mL    OUT:    Voided (mL): 400 mL  Total OUT: 400 mL    Total NET: -20 mL          LABS:                            10.7   6.66  )-----------( 84       ( 07 Nov 2024 05:57 )             32.0                                               11-07    140  |  103  |  56[H]  ----------------------------<  335[H]  4.0   |  21  |  1.7[H]    Ca    8.7      07 Nov 2024 05:57  Mg     2.0     11-07    TPro  5.9[L]  /  Alb  3.7  /  TBili  0.6  /  DBili  x   /  AST  25  /  ALT  15  /  AlkPhos  86  11-07      PT/INR - ( 06 Nov 2024 06:12 )   PT: 16.60 sec;   INR: 1.40 ratio                                                Urinalysis Basic - ( 07 Nov 2024 05:57 )    Color: x / Appearance: x / SG: x / pH: x  Gluc: 335 mg/dL / Ketone: x  / Bili: x / Urobili: x   Blood: x / Protein: x / Nitrite: x   Leuk Esterase: x / RBC: x / WBC x   Sq Epi: x / Non Sq Epi: x / Bacteria: x                                                  LIVER FUNCTIONS - ( 07 Nov 2024 05:57 )  Alb: 3.7 g/dL / Pro: 5.9 g/dL / ALK PHOS: 86 U/L / ALT: 15 U/L / AST: 25 U/L / GGT: x                                                  Culture - Blood (collected 05 Nov 2024 15:54)  Source: .Blood BLOOD  Preliminary Report (06 Nov 2024 22:01):    No growth at 24 hours                                                                                       ABG - ( 05 Nov 2024 12:18 )  pH, Arterial: 7.33  pH, Blood: x     /  pCO2: 44    /  pO2: 62    / HCO3: 23    / Base Excess: -2.8  /  SaO2: 92.4                MEDICATIONS  (STANDING):  apixaban 2.5 milliGRAM(s) Oral two times a day  atorvastatin 20 milliGRAM(s) Oral at bedtime  chlorhexidine 2% Cloths 1 Application(s) Topical daily  dexAMETHasone  Injectable 6 milliGRAM(s) IV Push daily  dextrose 5%. 1000 milliLiter(s) (50 mL/Hr) IV Continuous <Continuous>  dextrose 5%. 1000 milliLiter(s) (100 mL/Hr) IV Continuous <Continuous>  dextrose 50% Injectable 25 Gram(s) IV Push once  dextrose 50% Injectable 12.5 Gram(s) IV Push once  dextrose 50% Injectable 25 Gram(s) IV Push once  finasteride 5 milliGRAM(s) Oral daily  glucagon  Injectable 1 milliGRAM(s) IntraMuscular once  insulin glargine Injectable (LANTUS) 7 Unit(s) SubCutaneous at bedtime  insulin lispro (ADMELOG) corrective regimen sliding scale   SubCutaneous three times a day before meals  insulin lispro (ADMELOG) corrective regimen sliding scale   SubCutaneous at bedtime  insulin lispro Injectable (ADMELOG) 7 Unit(s) SubCutaneous three times a day before meals  lactated ringers. 1000 milliLiter(s) (75 mL/Hr) IV Continuous <Continuous>  NIFEdipine XL 30 milliGRAM(s) Oral at bedtime  pantoprazole  Injectable 40 milliGRAM(s) IV Push daily  polyethylene glycol 3350 17 Gram(s) Oral daily  remdesivir  IVPB 100 milliGRAM(s) IV Intermittent every 24 hours  remdesivir  IVPB   IV Intermittent   senna 2 Tablet(s) Oral at bedtime  tamsulosin 0.4 milliGRAM(s) Oral at bedtime    MEDICATIONS  (PRN):  dextrose Oral Gel 15 Gram(s) Oral once PRN Blood Glucose LESS THAN 70 milliGRAM(s)/deciliter      New X-rays reviewed:                                                                                  ECHO    CXR interpreted by me:

## 2024-11-07 NOTE — PROGRESS NOTE ADULT - ASSESSMENT
Impression:     COVID positive   COVID pneumonia ?   Acute hypoxemic respiratory failure   DWAIN  HO CAD/ CABG  AFIB on Eliquis   Metabolic encephalopathy resolved     PLAN:    CNS: MS adequate. CTH negative.     HEENT: Oral care    PULMONARY:  HOB @ 45 degrees. Wean off O2. Goal more than 92%. CXR noted with RLL infiltrate.     CARDIOVASCULAR: LR @ 75.  Avoid overload. Echo noted with pulm htn, LA enlargement and normal EF.     GI: GI prophylaxis.  Feeding as tolerated. Daily LFTs while on Remdisivir.     RENAL:  Follow up lytes.  Correct as needed. Kidney Bladder scan. Urine lytes.     INFECTIOUS DISEASE:  No leukocytosis. Afebrile. blood CX, UA negative. Procal slightly elevated. On Dexamethasone and Remdisivir. ID following.    HEMATOLOGICAL:  DVT prophylaxis: On Eliquis at baseline. Now on therapeutic Lovenox. VTE unlikely.     ENDOCRINE:  Follow up FS.  Insulin protocol if needed. Goal 140-180.     MUSCULOSKELETAL: Out of bed.     Medical floor

## 2024-11-07 NOTE — PROGRESS NOTE ADULT - ASSESSMENT
89-year-old male, past medical history of HTN, HLD, DM, CAD s/p CABG ~10 years ago, paroxysmal Afib on Eliquis, BPH, presents to the ED for worsening cough x  2 days.  Patient seen at PMD yesterday and diagnosed with COVID and prescribed Paxlovid. Patient was too weak to make it to the bathroom and urinated before getting to the bathroom. Cough +ve for 2 days. SOB on exertion for last 2 days.      acute respiratory failure with hypoxemia - resolved / COVID / hyperglycemia     - clinically improved     - complete 5 day course of Remdisivir and 10 day course of decadron   - increase Lantus ans aspart dosses for better glycemic control   - continue Eliquis and other home meds   - stable for DGTF

## 2024-11-07 NOTE — PROVIDER CONTACT NOTE (OTHER) - SITUATION
Pts blood sugar is elevated. PMH of DM. Pt is a/o/4, NAD, no pain complain. Md aware. Order for additional dose of insulin is obtained. Pts blood sugar is elevated.

## 2024-11-07 NOTE — DIETITIAN INITIAL EVALUATION ADULT - ORAL INTAKE PTA/DIET HISTORY
pt is alert but confused, no family at bedside. RD to obtain upon f/u. as per EMR review weight is relatively stable since 2021. NKFA noted    presently on a soft and bite CHO Consistent DASH/TLC diet tolerating well pt consumes >75% of all meals

## 2024-11-07 NOTE — DIETITIAN INITIAL EVALUATION ADULT - NS FNS DIET ORDER
Diet, Soft and Bite Sized:   Consistent Carbohydrate {No Snacks} (CSTCHO)  DASH/TLC {Sodium & Cholesterol Restricted} (DASH) (11-07-24 @ 11:44)

## 2024-11-08 LAB
-  AMPICILLIN: SIGNIFICANT CHANGE UP
-  CIPROFLOXACIN: SIGNIFICANT CHANGE UP
-  LEVOFLOXACIN: SIGNIFICANT CHANGE UP
-  NITROFURANTOIN: SIGNIFICANT CHANGE UP
-  TETRACYCLINE: SIGNIFICANT CHANGE UP
-  VANCOMYCIN: SIGNIFICANT CHANGE UP
ALBUMIN SERPL ELPH-MCNC: 3.7 G/DL — SIGNIFICANT CHANGE UP (ref 3.5–5.2)
ALP SERPL-CCNC: 86 U/L — SIGNIFICANT CHANGE UP (ref 30–115)
ALT FLD-CCNC: 18 U/L — SIGNIFICANT CHANGE UP (ref 0–41)
ANION GAP SERPL CALC-SCNC: 18 MMOL/L — HIGH (ref 7–14)
APPEARANCE UR: ABNORMAL
AST SERPL-CCNC: 23 U/L — SIGNIFICANT CHANGE UP (ref 0–41)
BACTERIA # UR AUTO: ABNORMAL /HPF
BASOPHILS # BLD AUTO: 0.01 K/UL — SIGNIFICANT CHANGE UP (ref 0–0.2)
BASOPHILS NFR BLD AUTO: 0.1 % — SIGNIFICANT CHANGE UP (ref 0–1)
BILIRUB SERPL-MCNC: 0.7 MG/DL — SIGNIFICANT CHANGE UP (ref 0.2–1.2)
BILIRUB UR-MCNC: NEGATIVE — SIGNIFICANT CHANGE UP
BUN SERPL-MCNC: 74 MG/DL — CRITICAL HIGH (ref 10–20)
CALCIUM SERPL-MCNC: 8.6 MG/DL — SIGNIFICANT CHANGE UP (ref 8.4–10.5)
CHLORIDE SERPL-SCNC: 99 MMOL/L — SIGNIFICANT CHANGE UP (ref 98–110)
CO2 SERPL-SCNC: 21 MMOL/L — SIGNIFICANT CHANGE UP (ref 17–32)
COLOR SPEC: SIGNIFICANT CHANGE UP
CREAT ?TM UR-MCNC: 133 MG/DL — SIGNIFICANT CHANGE UP
CREAT SERPL-MCNC: 1.8 MG/DL — HIGH (ref 0.7–1.5)
CULTURE RESULTS: ABNORMAL
DIFF PNL FLD: ABNORMAL
EGFR: 36 ML/MIN/1.73M2 — LOW
EOSINOPHIL # BLD AUTO: 0 K/UL — SIGNIFICANT CHANGE UP (ref 0–0.7)
EOSINOPHIL NFR BLD AUTO: 0 % — SIGNIFICANT CHANGE UP (ref 0–8)
FERRITIN SERPL-MCNC: 904 NG/ML — HIGH (ref 30–400)
GLUCOSE BLDC GLUCOMTR-MCNC: 186 MG/DL — HIGH (ref 70–99)
GLUCOSE BLDC GLUCOMTR-MCNC: 349 MG/DL — HIGH (ref 70–99)
GLUCOSE BLDC GLUCOMTR-MCNC: 375 MG/DL — HIGH (ref 70–99)
GLUCOSE BLDC GLUCOMTR-MCNC: 409 MG/DL — HIGH (ref 70–99)
GLUCOSE SERPL-MCNC: 356 MG/DL — HIGH (ref 70–99)
GLUCOSE UR QL: 100 MG/DL
HCT VFR BLD CALC: 31.7 % — LOW (ref 42–52)
HGB BLD-MCNC: 10.5 G/DL — LOW (ref 14–18)
IMM GRANULOCYTES NFR BLD AUTO: 0.8 % — HIGH (ref 0.1–0.3)
KETONES UR-MCNC: NEGATIVE MG/DL — SIGNIFICANT CHANGE UP
LEUKOCYTE ESTERASE UR-ACNC: NEGATIVE — SIGNIFICANT CHANGE UP
LYMPHOCYTES # BLD AUTO: 0.47 K/UL — LOW (ref 1.2–3.4)
LYMPHOCYTES # BLD AUTO: 5.2 % — LOW (ref 20.5–51.1)
MAGNESIUM SERPL-MCNC: 2 MG/DL — SIGNIFICANT CHANGE UP (ref 1.8–2.4)
MCHC RBC-ENTMCNC: 29.9 PG — SIGNIFICANT CHANGE UP (ref 27–31)
MCHC RBC-ENTMCNC: 33.1 G/DL — SIGNIFICANT CHANGE UP (ref 32–37)
MCV RBC AUTO: 90.3 FL — SIGNIFICANT CHANGE UP (ref 80–94)
METHOD TYPE: SIGNIFICANT CHANGE UP
MONOCYTES # BLD AUTO: 0.28 K/UL — SIGNIFICANT CHANGE UP (ref 0.1–0.6)
MONOCYTES NFR BLD AUTO: 3.1 % — SIGNIFICANT CHANGE UP (ref 1.7–9.3)
NEUTROPHILS # BLD AUTO: 8.26 K/UL — HIGH (ref 1.4–6.5)
NEUTROPHILS NFR BLD AUTO: 90.8 % — HIGH (ref 42.2–75.2)
NITRITE UR-MCNC: NEGATIVE — SIGNIFICANT CHANGE UP
NRBC # BLD: 0 /100 WBCS — SIGNIFICANT CHANGE UP (ref 0–0)
ORGANISM # SPEC MICROSCOPIC CNT: ABNORMAL
ORGANISM # SPEC MICROSCOPIC CNT: SIGNIFICANT CHANGE UP
OSMOLALITY UR: 497 MOS/KG — SIGNIFICANT CHANGE UP (ref 50–1200)
PH UR: 5.5 — SIGNIFICANT CHANGE UP (ref 5–8)
PLATELET # BLD AUTO: 105 K/UL — LOW (ref 130–400)
PMV BLD: 13.5 FL — HIGH (ref 7.4–10.4)
POTASSIUM SERPL-MCNC: 4.1 MMOL/L — SIGNIFICANT CHANGE UP (ref 3.5–5)
POTASSIUM SERPL-SCNC: 4.1 MMOL/L — SIGNIFICANT CHANGE UP (ref 3.5–5)
POTASSIUM UR-SCNC: 50 MMOL/L — SIGNIFICANT CHANGE UP
PROCALCITONIN SERPL-MCNC: 1.18 NG/ML — HIGH (ref 0.02–0.1)
PROT ?TM UR-MCNC: 28 MG/DLG/24H — SIGNIFICANT CHANGE UP
PROT SERPL-MCNC: 6 G/DL — SIGNIFICANT CHANGE UP (ref 6–8)
PROT UR-MCNC: 30 MG/DL
PROT/CREAT UR-RTO: 0.2 RATIO — SIGNIFICANT CHANGE UP (ref 0–0.2)
RBC # BLD: 3.51 M/UL — LOW (ref 4.7–6.1)
RBC # FLD: 14 % — SIGNIFICANT CHANGE UP (ref 11.5–14.5)
RBC CASTS # UR COMP ASSIST: 25 /HPF — HIGH (ref 0–4)
SODIUM SERPL-SCNC: 138 MMOL/L — SIGNIFICANT CHANGE UP (ref 135–146)
SODIUM UR-SCNC: <20 MMOL/L — SIGNIFICANT CHANGE UP
SP GR SPEC: 1.02 — SIGNIFICANT CHANGE UP (ref 1–1.03)
SPECIMEN SOURCE: SIGNIFICANT CHANGE UP
UROBILINOGEN FLD QL: 0.2 MG/DL — SIGNIFICANT CHANGE UP (ref 0.2–1)
UUN UR-MCNC: 891 MG/DL — SIGNIFICANT CHANGE UP
WBC # BLD: 9.09 K/UL — SIGNIFICANT CHANGE UP (ref 4.8–10.8)
WBC # FLD AUTO: 9.09 K/UL — SIGNIFICANT CHANGE UP (ref 4.8–10.8)
WBC UR QL: 2 /HPF — SIGNIFICANT CHANGE UP (ref 0–5)

## 2024-11-08 PROCEDURE — 99232 SBSQ HOSP IP/OBS MODERATE 35: CPT

## 2024-11-08 PROCEDURE — 71045 X-RAY EXAM CHEST 1 VIEW: CPT | Mod: 26

## 2024-11-08 RX ORDER — INSULIN LISPRO 100/ML
14 VIAL (ML) SUBCUTANEOUS
Refills: 0 | Status: DISCONTINUED | OUTPATIENT
Start: 2024-11-08 | End: 2024-11-13

## 2024-11-08 RX ORDER — INSULIN GLARGINE,HUM.REC.ANLOG 100/ML
10 VIAL (ML) SUBCUTANEOUS ONCE
Refills: 0 | Status: COMPLETED | OUTPATIENT
Start: 2024-11-08 | End: 2024-11-08

## 2024-11-08 RX ORDER — DEXAMETHASONE 1.5 MG 1.5 MG/1
6 TABLET ORAL DAILY
Refills: 0 | Status: DISCONTINUED | OUTPATIENT
Start: 2024-11-09 | End: 2024-11-09

## 2024-11-08 RX ORDER — INSULIN GLARGINE,HUM.REC.ANLOG 100/ML
18 VIAL (ML) SUBCUTANEOUS AT BEDTIME
Refills: 0 | Status: DISCONTINUED | OUTPATIENT
Start: 2024-11-08 | End: 2024-11-13

## 2024-11-08 RX ADMIN — Medication 20 MILLIGRAM(S): at 21:20

## 2024-11-08 RX ADMIN — Medication 14 UNIT(S): at 16:54

## 2024-11-08 RX ADMIN — APIXABAN 2.5 MILLIGRAM(S): 5 TABLET, FILM COATED ORAL at 05:30

## 2024-11-08 RX ADMIN — Medication 30 MILLIGRAM(S): at 21:21

## 2024-11-08 RX ADMIN — Medication 4: at 16:54

## 2024-11-08 RX ADMIN — DEXAMETHASONE 1.5 MG 6 MILLIGRAM(S): 1.5 TABLET ORAL at 05:31

## 2024-11-08 RX ADMIN — REMDESIVIR 200 MILLIGRAM(S): 100 INJECTION, POWDER, LYOPHILIZED, FOR SOLUTION INTRAVENOUS at 17:11

## 2024-11-08 RX ADMIN — Medication 0.4 MILLIGRAM(S): at 21:21

## 2024-11-08 RX ADMIN — Medication 9 UNIT(S): at 08:11

## 2024-11-08 RX ADMIN — Medication 10 UNIT(S): at 08:44

## 2024-11-08 RX ADMIN — Medication 18 UNIT(S): at 21:21

## 2024-11-08 RX ADMIN — Medication 2 TABLET(S): at 21:21

## 2024-11-08 RX ADMIN — APIXABAN 2.5 MILLIGRAM(S): 5 TABLET, FILM COATED ORAL at 17:11

## 2024-11-08 RX ADMIN — PANTOPRAZOLE SODIUM 40 MILLIGRAM(S): 40 TABLET, DELAYED RELEASE ORAL at 05:30

## 2024-11-08 RX ADMIN — Medication 6: at 12:05

## 2024-11-08 RX ADMIN — FINASTERIDE 5 MILLIGRAM(S): 5 TABLET, FILM COATED ORAL at 11:55

## 2024-11-08 RX ADMIN — Medication 14 UNIT(S): at 12:05

## 2024-11-08 RX ADMIN — Medication 5: at 08:10

## 2024-11-08 NOTE — PROVIDER CONTACT NOTE (OTHER) - ASSESSMENT
BM moderate soft brown. No bleeding from rectum or meatus. Hgb stable.
a/o/3. NAD. No complains at this moment.
Pt denies any pain to urethra/meatus.
Pt is a/o/4, NAD, no pain complain.

## 2024-11-08 NOTE — PROGRESS NOTE ADULT - ASSESSMENT
Impression:     COVID positive   COVID pneumonia ?   Acute hypoxemic respiratory failure   DWAIN  HO CAD/ CABG  AFIB on Eliquis   Metabolic encephalopathy resolved     PLAN:    CNS: MS adequate. CTH negative.     HEENT: Oral care    PULMONARY:  HOB @ 45 degrees. Wean off O2. Goal more than 92%.     CARDIOVASCULAR: ???? LR @ 75.  Avoid overload. Echo noted with pulm htn, LA enlargement and normal EF.     GI: GI prophylaxis.  Feeding as tolerated. Daily LFTs while on Remdisivir.     RENAL:  Follow up lytes.  Correct as needed. Kidney Bladder scan negative for hydro. Urine lytes.    INFECTIOUS DISEASE:  No leukocytosis. Afebrile. blood CX, UA negative. Procal slightly elevated. On Dexamethasone and Remdisivir. ID following.    HEMATOLOGICAL:  DVT prophylaxis: On Eliquis at baseline. Now on therapeutic Lovenox. VTE unlikely.     ENDOCRINE:  Follow up FS.  Insulin protocol if needed. Goal 140-180.     MUSCULOSKELETAL: Out of bed. PT/OT    Medical floor

## 2024-11-08 NOTE — PROGRESS NOTE ADULT - ASSESSMENT
89-year-old male, past medical history of HTN, HLD, DM, CAD s/p CABG ~10 years ago, Afib on Eliquis, BPH, presents to the ED for worsening cough onset 2 days.    ID is consulted for COVID  Febrile to 102.7  WBC 6.47 > 5.34  On 4L NC  COVID +  UA WBC 6, UCx pending  BCx pending  Procal 0.45  ESR 17  CRP 92.6  D-dimer 283    CXR left basilar atelectasis    Antibiotics:  Remdesivir 11/5 ->      IMPRESSION:  Severe COVID  Acute hypoxemic respiratory failure  DWAIN  DM  Immunosuppression / Immunosenescence secondary to multiple comorbidities which could result in poor clinical outcome    RECOMMENDATIONS:  - Low suspicion for superimposed bacterial pneumonia, monitor off abx for now  - Continue IV Remdesivir x 5 days, continue IV/PO Decadron 6mg q24hrs x 10 days; monitor LFTs closely  - Trend procal, D-dimer, CRP, ferritin q48hrs  - Follow up BCx and UCx  - Offloading and frequent position changes, aspiration precaution  - Trend WBC, fever curve, transaminases, creatinine daily      Ambika Landaverde D.O.  Attending Physician  Division of Infectious Diseases  [facility] -   Please contact me via [employer] Teams

## 2024-11-08 NOTE — PROGRESS NOTE ADULT - SUBJECTIVE AND OBJECTIVE BOX
INFECTIOUS DISEASE FOLLOW UP NOTE:    Interval History/ROS: Patient is a 89y old  Male who presents with a chief complaint of sob/cough/weakness (2024 08:39)      Overnight events:    REVIEW OF SYSTEMS:        Prior hospital charts reviewed [Yes]  Primary team notes reviewed [Yes]  Other consultant notes reviewed [Yes]    Allergies:  No Known Allergies      ANTIMICROBIALS:   remdesivir  IVPB    remdesivir  IVPB 100 every 24 hours      OTHER MEDS: MEDICATIONS  (STANDING):  apixaban 2.5 two times a day  atorvastatin 20 at bedtime  dextrose 50% Injectable 25 once  dextrose 50% Injectable 25 once  dextrose 50% Injectable 12.5 once  dextrose Oral Gel 15 once PRN  finasteride 5 daily  glucagon  Injectable 1 once  insulin glargine Injectable (LANTUS) 12 at bedtime  insulin lispro (ADMELOG) corrective regimen sliding scale  at bedtime  insulin lispro (ADMELOG) corrective regimen sliding scale  three times a day before meals  insulin lispro Injectable (ADMELOG) 9 three times a day before meals  NIFEdipine XL 30 at bedtime  pantoprazole    Tablet 40 before breakfast  polyethylene glycol 3350 17 daily  senna 2 at bedtime  tamsulosin 0.4 at bedtime      Vital Signs Last 24 Hrs  T(F): 96.9 (24 @ 07:27), Max: 102.7 (24 @ 08:43)    Vital Signs Last 24 Hrs  HR: 77 (24 @ 08:00) (67 - 78)  BP: 141/63 (24 @ 08:00) (121/58 - 141/63)  RR: 18 (24 @ 08:00)  SpO2: 93% (24 @ 08:00) (91% - 96%)  Wt(kg): --    EXAM:      Labs:                        10.5   9.09  )-----------( 105      ( 2024 06:20 )             31.7         138  |  99  |  74[HH]  ----------------------------<  356[H]  4.1   |  21  |  1.8[H]    Ca    8.6      2024 06:20  Mg     2.0         TPro  6.0  /  Alb  3.7  /  TBili  0.7  /  DBili  x   /  AST  23  /  ALT  18  /  AlkPhos  86        WBC Trend:  WBC Count: 9.09 (24 @ 06:20)  WBC Count: 8.51 (24 @ 15:47)  WBC Count: 6.66 (24 @ 05:57)  WBC Count: 9.37 (24 @ 19:07)      Creatine Trend:  Creatinine: 1.8 ()  Creatinine: 2.0 ()  Creatinine: 1.7 ()  Creatinine: 0.9 ()      Liver Biochemical Testing Trend:  Alanine Aminotransferase (ALT/SGPT): 18 ()  Alanine Aminotransferase (ALT/SGPT): 17 ()  Alanine Aminotransferase (ALT/SGPT): 15 ()  Alanine Aminotransferase (ALT/SGPT): 12 ()  Alanine Aminotransferase (ALT/SGPT): 11 ()  Aspartate Aminotransferase (AST/SGOT): 23 (24 @ 06:20)  Aspartate Aminotransferase (AST/SGOT): 25 (24 @ 15:47)  Aspartate Aminotransferase (AST/SGOT): 25 (24 @ 05:57)  Aspartate Aminotransferase (AST/SGOT): 21 (24 @ 06:12)  Aspartate Aminotransferase (AST/SGOT): 21 (24 @ 05:23)  Bilirubin Total: 0.7 ()  Bilirubin Total: 0.5 ()  Bilirubin Direct: 0.2 ()  Bilirubin Total: 0.6 ()  Bilirubin Direct: 0.3 ()      Trend LDH      Urinalysis Basic - ( 2024 08:43 )    Color: Dark Yellow / Appearance: Cloudy / S.019 / pH: x  Gluc: x / Ketone: Negative mg/dL  / Bili: Negative / Urobili: 0.2 mg/dL   Blood: x / Protein: 30 mg/dL / Nitrite: Negative   Leuk Esterase: Negative / RBC: x / WBC x   Sq Epi: x / Non Sq Epi: x / Bacteria: x        MICROBIOLOGY:        Culture - Urine (collected 2024 18:20)  Source: Catheterized Catheterized  Preliminary Report:    50,000 - 99,000 CFU/mL Enterococcus faecalis    Culture - Blood (collected 2024 15:54)  Source: .Blood BLOOD  Preliminary Report:    No growth at 48 Hours    COVID-19 PCR: Detected (24 @ 11:40)      Procalcitonin: 1.18 ()  Procalcitonin: 0.45 ()    C-Reactive Protein: 50.1 ()  C-Reactive Protein: 92.6 ()    Ferritin: 904 ()  Ferritin: 562 ()    D-Dimer Assay, Quantitative: 170 ()  D-Dimer Assay, Quantitative: 283 ()          Lactate, Blood: 1.5 ( @ 05:23)  Lactate, Blood: 3.0 ( @ 19:49)  Lactate, Blood: 3.6 ( @ 15:54)  Blood Gas Arterial, Lactate: 3.0 ( @ 12:18)      RADIOLOGY:  imaging below personally reviewed   INFECTIOUS DISEASE FOLLOW UP NOTE:    Interval History/ROS: Patient is a 89y old  Male who presents with a chief complaint of sob/cough/weakness (2024 08:39)    Overnight events: No overnight event.     REVIEW OF SYSTEMS:  CONSTITUTIONAL: No fever or chills; feels cold  HEAD: No lesion on scalp  EYES: No visual disturbance  ENT: No sore throat  RESPIRATORY: mild cough, no  shortness of breath  CARDIOVASCULAR: No chest pain or palpitations  GASTROINTESTINAL: No abdominal or epigastric pain  GENITOURINARY: No dysuria  NEUROLOGICAL: No headache/dizziness  MUSCULOSKELETAL: No joint pain, erythema, or swelling; no back pain  SKIN: No itching, rashes  All other ROS negative except noted above      Prior hospital charts reviewed [Yes]  Primary team notes reviewed [Yes]  Other consultant notes reviewed [Yes]    Allergies:  No Known Allergies      ANTIMICROBIALS:   remdesivir  IVPB    remdesivir  IVPB 100 every 24 hours      OTHER MEDS: MEDICATIONS  (STANDING):  apixaban 2.5 two times a day  atorvastatin 20 at bedtime  dextrose 50% Injectable 25 once  dextrose 50% Injectable 25 once  dextrose 50% Injectable 12.5 once  dextrose Oral Gel 15 once PRN  finasteride 5 daily  glucagon  Injectable 1 once  insulin glargine Injectable (LANTUS) 12 at bedtime  insulin lispro (ADMELOG) corrective regimen sliding scale  at bedtime  insulin lispro (ADMELOG) corrective regimen sliding scale  three times a day before meals  insulin lispro Injectable (ADMELOG) 9 three times a day before meals  NIFEdipine XL 30 at bedtime  pantoprazole    Tablet 40 before breakfast  polyethylene glycol 3350 17 daily  senna 2 at bedtime  tamsulosin 0.4 at bedtime      Vital Signs Last 24 Hrs  T(F): 96.9 (24 @ 07:27), Max: 102.7 (24 @ 08:43)    Vital Signs Last 24 Hrs  HR: 77 (24 @ 08:00) (67 - 78)  BP: 141/63 (24 @ 08:00) (121/58 - 141/63)  RR: 18 (24 @ 08:00)  SpO2: 93% (24 @ 08:00) (91% - 96%)  Wt(kg): --    EXAM:  GENERAL: NAD, lying in bed  HEAD: No head lesions  EYES: Conjunctiva pink and cornea white  EAR, NOSE, MOUTH, THROAT: Normal external ears and nose, no discharges; moist mucous membranes  NECK: Supple, nontender to palpation; no JVD  RESPIRATORY: Clear to auscultation bilaterally  CARDIOVASCULAR: S1 S2  GASTROINTESTINAL: Soft, nontender, nondistended; normoactive bowel sounds  GENITOURINARY: No tomlin catheter, no CVA tenderness  EXTREMITIES: No clubbing, cyanosis, or petal edema  NERVOUS SYSTEM: Alert and oriented to person, time, place and situation, speech clear. No focal deficits   MUSCULOSKELETAL: No joint erythema, swelling or pain  SKIN: No rashes or lesions, no superficial thrombophlebitis  PSYCH: Normal affect    Labs:                        10.5   9.09  )-----------( 105      ( 2024 06:20 )             31.7         138  |  99  |  74[HH]  ----------------------------<  356[H]  4.1   |  21  |  1.8[H]    Ca    8.6      2024 06:20  Mg     2.0         TPro  6.0  /  Alb  3.7  /  TBili  0.7  /  DBili  x   /  AST  23  /  ALT  18  /  AlkPhos  86        WBC Trend:  WBC Count: 9.09 (24 @ 06:20)  WBC Count: 8.51 (24 @ 15:47)  WBC Count: 6.66 (24 @ 05:57)  WBC Count: 9.37 (24 @ 19:07)      Creatine Trend:  Creatinine: 1.8 ()  Creatinine: 2.0 ()  Creatinine: 1.7 ()  Creatinine: 0.9 ()      Liver Biochemical Testing Trend:  Alanine Aminotransferase (ALT/SGPT): 18 ()  Alanine Aminotransferase (ALT/SGPT): 17 ()  Alanine Aminotransferase (ALT/SGPT): 15 ()  Alanine Aminotransferase (ALT/SGPT): 12 ()  Alanine Aminotransferase (ALT/SGPT): 11 ()  Aspartate Aminotransferase (AST/SGOT): 23 (24 @ 06:20)  Aspartate Aminotransferase (AST/SGOT): 25 (24 @ 15:47)  Aspartate Aminotransferase (AST/SGOT): 25 (24 @ 05:57)  Aspartate Aminotransferase (AST/SGOT): 21 (24 @ 06:12)  Aspartate Aminotransferase (AST/SGOT): 21 (24 @ 05:23)  Bilirubin Total: 0.7 ()  Bilirubin Total: 0.5 ()  Bilirubin Direct: 0.2 ()  Bilirubin Total: 0.6 ()  Bilirubin Direct: 0.3 ()      Trend LDH      Urinalysis Basic - ( 2024 08:43 )    Color: Dark Yellow / Appearance: Cloudy / S.019 / pH: x  Gluc: x / Ketone: Negative mg/dL  / Bili: Negative / Urobili: 0.2 mg/dL   Blood: x / Protein: 30 mg/dL / Nitrite: Negative   Leuk Esterase: Negative / RBC: x / WBC x   Sq Epi: x / Non Sq Epi: x / Bacteria: x        MICROBIOLOGY:        Culture - Urine (collected 2024 18:20)  Source: Catheterized Catheterized  Preliminary Report:    50,000 - 99,000 CFU/mL Enterococcus faecalis    Culture - Blood (collected 2024 15:54)  Source: .Blood BLOOD  Preliminary Report:    No growth at 48 Hours    COVID-19 PCR: Detected (24 @ 11:40)      Procalcitonin: 1.18 ()  Procalcitonin: 0.45 ()    C-Reactive Protein: 50.1 ()  C-Reactive Protein: 92.6 ()    Ferritin: 904 ()  Ferritin: 562 ()    D-Dimer Assay, Quantitative: 170 ()  D-Dimer Assay, Quantitative: 283 ()          Lactate, Blood: 1.5 ( @ 05:23)  Lactate, Blood: 3.0 ( @ 19:49)  Lactate, Blood: 3.6 ( @ 15:54)  Blood Gas Arterial, Lactate: 3.0 ( @ 12:18)      RADIOLOGY:  imaging below personally reviewed    < from: Xray Chest 1 View- PORTABLE-Routine (Xray Chest 1 View- PORTABLE-Routine in AM.) (24 @ 06:49) >  IMPRESSION:    Redemonstration bibasilar focal atelectasis      < end of copied text >

## 2024-11-08 NOTE — PROVIDER CONTACT NOTE (OTHER) - ACTION/TREATMENT ORDERED:
Assess H/H on repeat CBC, cont to monitor & assess if UO clears up throughout night shift
Order of 14 units of lispro is obtained.
Continue to monitor.
Order for additional dose of insulin is obtained.

## 2024-11-08 NOTE — PROGRESS NOTE ADULT - SUBJECTIVE AND OBJECTIVE BOX
Patient is a 89y old  Male who presents with a chief complaint of sob/cough/weakness (08 Nov 2024 08:05)        Over Night Events: 2 L NC        ROS:     All ROS are negative except HPI         PHYSICAL EXAM    ICU Vital Signs Last 24 Hrs  T(C): 36.1 (08 Nov 2024 07:27), Max: 36.5 (07 Nov 2024 15:05)  T(F): 96.9 (08 Nov 2024 07:27), Max: 97.7 (07 Nov 2024 15:05)  HR: 78 (08 Nov 2024 07:27) (67 - 78)  BP: 121/58 (08 Nov 2024 07:27) (121/58 - 132/63)  BP(mean): 84 (08 Nov 2024 07:27) (84 - 91)  ABP: --  ABP(mean): --  RR: 24 (08 Nov 2024 07:27) (17 - 25)  SpO2: 96% (08 Nov 2024 07:27) (91% - 96%)    O2 Parameters below as of 08 Nov 2024 00:00  Patient On (Oxygen Delivery Method): nasal cannula  O2 Flow (L/min): 2          CONSTITUTIONAL:  Well nourished.  NAD    ENT:   Airway patent,   Mouth with normal mucosa.   No thrush    EYES:   Pupils equal,   Round and reactive to light.    CARDIAC:   Normal rate,   Regular rhythm.    No edema      Vascular:  Normal systolic impulse  No Carotid bruits    RESPIRATORY:   No wheezing  Bilateral BS  Normal chest expansion  Not tachypneic,  No use of accessory muscles    GASTROINTESTINAL:  Abdomen soft,   Non-tender,   No guarding,   + BS    MUSCULOSKELETAL:   Range of motion is not limited,  No clubbing, cyanosis    NEUROLOGICAL:   Alert and oriented   No motor  deficits.    SKIN:   Skin normal color for race,   Warm and dry and intact.   No evidence of rash.        11-07-24 @ 07:01  -  11-08-24 @ 07:00  --------------------------------------------------------  IN:    Lactated Ringers: 1050 mL    Oral Fluid: 200 mL  Total IN: 1250 mL    OUT:    Voided (mL): 260 mL  Total OUT: 260 mL    Total NET: 990 mL          LABS:                            10.5   9.09  )-----------( 105      ( 08 Nov 2024 06:20 )             31.7                                               11-07    136  |  99  |  68[HH]  ----------------------------<  402[H]  3.9   |  20  |  2.0[H]    Ca    8.4      07 Nov 2024 15:47  Mg     2.0     11-07    TPro  5.9[L]  /  Alb  3.7  /  TBili  0.5  /  DBili  0.2  /  AST  25  /  ALT  17  /  AlkPhos  91  11-07      PT/INR - ( 07 Nov 2024 15:47 )   PT: 16.50 sec;   INR: 1.39 ratio                                                Urinalysis Basic - ( 07 Nov 2024 15:47 )    Color: x / Appearance: x / SG: x / pH: x  Gluc: 402 mg/dL / Ketone: x  / Bili: x / Urobili: x   Blood: x / Protein: x / Nitrite: x   Leuk Esterase: x / RBC: x / WBC x   Sq Epi: x / Non Sq Epi: x / Bacteria: x                                                  LIVER FUNCTIONS - ( 07 Nov 2024 15:47 )  Alb: 3.7 g/dL / Pro: 5.9 g/dL / ALK PHOS: 91 U/L / ALT: 17 U/L / AST: 25 U/L / GGT: x                                                  Culture - Urine (collected 05 Nov 2024 18:20)  Source: Catheterized Catheterized  Preliminary Report (07 Nov 2024 21:57):    50,000 - 99,000 CFU/mL Enterococcus faecalis    Culture - Blood (collected 05 Nov 2024 15:54)  Source: .Blood BLOOD  Preliminary Report (07 Nov 2024 22:01):    No growth at 48 Hours                                                                                           MEDICATIONS  (STANDING):  apixaban 2.5 milliGRAM(s) Oral two times a day  atorvastatin 20 milliGRAM(s) Oral at bedtime  chlorhexidine 2% Cloths 1 Application(s) Topical daily  dextrose 5%. 1000 milliLiter(s) (50 mL/Hr) IV Continuous <Continuous>  dextrose 5%. 1000 milliLiter(s) (100 mL/Hr) IV Continuous <Continuous>  dextrose 50% Injectable 25 Gram(s) IV Push once  dextrose 50% Injectable 12.5 Gram(s) IV Push once  dextrose 50% Injectable 25 Gram(s) IV Push once  finasteride 5 milliGRAM(s) Oral daily  glucagon  Injectable 1 milliGRAM(s) IntraMuscular once  insulin glargine Injectable (LANTUS) 10 Unit(s) SubCutaneous once  insulin glargine Injectable (LANTUS) 12 Unit(s) SubCutaneous at bedtime  insulin lispro (ADMELOG) corrective regimen sliding scale   SubCutaneous three times a day before meals  insulin lispro (ADMELOG) corrective regimen sliding scale   SubCutaneous at bedtime  insulin lispro Injectable (ADMELOG) 9 Unit(s) SubCutaneous three times a day before meals  lactated ringers. 1000 milliLiter(s) (75 mL/Hr) IV Continuous <Continuous>  NIFEdipine XL 30 milliGRAM(s) Oral at bedtime  pantoprazole    Tablet 40 milliGRAM(s) Oral before breakfast  polyethylene glycol 3350 17 Gram(s) Oral daily  remdesivir  IVPB 100 milliGRAM(s) IV Intermittent every 24 hours  remdesivir  IVPB   IV Intermittent   senna 2 Tablet(s) Oral at bedtime  tamsulosin 0.4 milliGRAM(s) Oral at bedtime    MEDICATIONS  (PRN):  dextrose Oral Gel 15 Gram(s) Oral once PRN Blood Glucose LESS THAN 70 milliGRAM(s)/deciliter      New X-rays reviewed:                                                                                  ECHO

## 2024-11-08 NOTE — PATIENT PROFILE ADULT - FALL HARM RISK - DEVICES
Ambulatory Visit  Name: Suki Mares      : 1962      MRN: [id number]  Encounter Provider: Frederick Quiñones DO  Encounter Date: 2024   Encounter department: [facility] [place]    Assessment & Plan  Gastroesophageal reflux disease without esophagitis  Symptoms remain stable at this time.  She remains dependent on the medication otherwise will have significant breakthrough symptoms.  Recent EGD reviewed with small hiatal hernia which may be the cause for her persistent reflux symptoms.  Fortunately, this is well-controlled with once daily omeprazole.  Previous biopsies were negative.    We will continue with omeprazole at this time  No indication for hiatal hernia repair evaluation given its small size and optimal control of symptoms with medical therapy    Orders:    omeprazole (PriLOSEC) 40 MG capsule; Take 1 capsule (40 mg total) by mouth daily before dinner    Tubulovillous adenoma of colon  Status post right hemicolectomy with colorectal surgery.  The mass seen in the cecum was confirmed to be tubulovillous adenoma.  There was no evidence of malignancy.  Lymph nodes were negative.  He she has recovered well from her surgery.    As there was no evidence of malignancy, I recommend that we continue with surveillance colonoscopies with the next one being in 3 years based on pathology results  Follow-up with colorectal surgery for postop evaluation       Postprandial diarrhea  Does have some mild postprandial diarrhea although this appears to be a more chronic issue related to her gallbladder (bile acid diarrhea) and possibly some underlying functional component.  Fortunately, her symptoms are fairly mild.  Her symptoms are dependent on dietary factors.  Recent colonoscopy reviewed.  Low suspicion for IBD or infectious etiology.  Biopsies were negative for celiac sprue.    I have encouraged her to continue following a low-fat diet  If necessary, can consider trial of colestipol  although we will hold off for now given her relatively mild symptoms         Family history of colon cancer in father  Increased risk for colon cancer given her family history.    Next colonoscopy in 3 years as noted above for surveillance after removal of tubulovillous adenoma/right hemicolectomy  Eventual surveillance intervals every 5 years based on family history         History of Present Illness     Suki Mares is a 62 y.o. female who presents to GI office for follow-up.  Patient underwent bidirectional endoscopy and was found to have a large cecal mass.  She underwent colorectal surgery evaluation and elected to undergo right hemicolectomy.  Fortunately, pathology results confirmed only tubulovillous adenoma with no evidence of underlying malignancy.  Lymph nodes were negative.  She has recovered well from her surgery and she denies any abdominal pain.  She does have some issues with diarrhea although this is a chronic issue.  She reports that this has been ongoing ever since her gallbladder removal.  However, her symptoms are relatively mild.  She admits that when she eats certain types of foods including fatty foods, her symptoms will worsen.  She is otherwise doing okay and denies any hematochezia or melena.  Reflux symptoms appear to be well-controlled at this time.  She does admit that if she forgets to take her omeprazole, she will have a significant flareup of reflux.    History obtained from : patient  Review of Systems   Constitutional:  Negative for fever.   Gastrointestinal:  Positive for abdominal pain and diarrhea. Negative for nausea and vomiting.   Genitourinary:  Positive for frequency. Negative for dysuria and hematuria.   Musculoskeletal:  Negative for arthralgias and myalgias.   Neurological:  Negative for headaches.     Answers submitted by the patient for this visit:  Abdominal Pain Questionnaire (Submitted on 11/6/2024)  Chief Complaint: Abdominal pain  Progression since onset:  resolved  anorexia: No  belching: No  flatus: Yes  hematochezia: No  melena: No  weight loss: No  Aggravated by: nothing  Relieved by: belching, bowel movements  Diagnostic workup: lower endoscopy    Medical History Reviewed by provider this encounter:       Current Outpatient Medications on File Prior to Visit   Medication Sig Dispense Refill    acetaminophen (TYLENOL) 325 mg tablet Take 2 tablets (650 mg total) by mouth every 4 (four) hours as needed for mild pain      albuterol (PROVENTIL HFA,VENTOLIN HFA) 90 mcg/act inhaler Inhale 1-2 puffs every 4 (four) hours as needed for shortness of breath or wheezing 8 g 3    Ergocalciferol (VITAMIN D2 PO) Take by mouth      Multiple Vitamin (MULTI-VITAMIN DAILY PO) Take 1 tablet by mouth daily      umeclidinium-vilanterol (Anoro Ellipta) 62.5-25 mcg/actuation inhaler Inhale 1 puff daily 60 each 11    buPROPion (WELLBUTRIN SR) 150 mg 12 hr tablet Take 1 tablet by mouth twice daily (Patient not taking: Reported on 6/26/2024) 60 tablet 5    cholecalciferol (VITAMIN D3) 1,000 units tablet Take 2,000 Units by mouth daily (Patient not taking: Reported on 8/23/2024)      diclofenac sodium (VOLTAREN) 1 % Apply 2 g topically 4 (four) times a day (Patient not taking: Reported on 6/26/2024)      ibuprofen (MOTRIN) 600 mg tablet Take 1 tablet by mouth every 6 (six) hours as needed for mild pain or moderate pain for up to 30 days 10 tablet 0     No current facility-administered medications on file prior to visit.      Social History     Tobacco Use    Smoking status: Former     Current packs/day: 0.25     Average packs/day: 1 pack/day for 31.6 years (30.5 ttl pk-yrs)     Types: Cigarettes     Start date: 11/13/1990     Quit date: 12/22/2020    Smokeless tobacco: Never    Tobacco comments:     Attempting to quit-4/24   Vaping Use    Vaping status: Never Used   Substance and Sexual Activity    Alcohol use: Yes     Alcohol/week: 7.0 standard drinks of alcohol     Types: 7 Standard drinks  "or equivalent per week     Comment: rarely    Drug use: No    Sexual activity: Not Currently         Objective     BP (!) 190/94 (BP Location: Left arm, Patient Position: Sitting, Cuff Size: Standard)   Temp 98.2 °F (36.8 °C) (Temporal)   Ht 5' 8\" (1.727 m)   Wt 67.7 kg (149 lb 3.2 oz)   SpO2 96%   BMI 22.69 kg/m²     Physical Exam  Vitals reviewed.   Constitutional:       Appearance: Normal appearance.   HENT:      Head: Normocephalic and atraumatic.      Nose: Nose normal.   Eyes:      Extraocular Movements: Extraocular movements intact.   Cardiovascular:      Rate and Rhythm: Normal rate and regular rhythm.   Pulmonary:      Effort: Pulmonary effort is normal. No respiratory distress.   Abdominal:      General: Abdomen is flat. Bowel sounds are normal. There is no distension.      Palpations: Abdomen is soft. There is no mass.      Tenderness: There is no abdominal tenderness. There is no guarding.   Neurological:      General: No focal deficit present.      Mental Status: She is alert.   Psychiatric:         Mood and Affect: Mood normal.         Behavior: Behavior normal.       Administrative Statements   Topics discussed with the patient / family include symptom assessment and management, medication review, and medication adjustment.  " None

## 2024-11-08 NOTE — PROGRESS NOTE ADULT - SUBJECTIVE AND OBJECTIVE BOX
Patient seen and evaluated this am, comfortable in bed on nc oxygen, still c/o cough and SOB      T(F): 96.9 (11-08-24 @ 07:27), Max: 97.7 (11-07-24 @ 15:05)  HR: 78 (11-08-24 @ 07:27)  BP: 121/58 (11-08-24 @ 07:27)  RR: 24 (11-08-24 @ 07:27)  SpO2: 96% (11-08-24 @ 07:27) (91% - 96%)    PHYSICAL EXAM:  GENERAL: NAD  HEAD:  Atraumatic, Normocephalic  EYES: EOMI, PERRLA, conjunctiva and sclera clear  NERVOUS SYSTEM: no focal deficits   CHEST/LUNG:  bilateral rhonchi  HEART: Regular rate and rhythm; No murmurs, rubs, or gallops  ABDOMEN: Soft, Nontender, Nondistended; Bowel sounds present  EXTREMITIES:  2+ Peripheral Pulses, No clubbing, cyanosis, or edema    LABS  11-07    136  |  99  |  68[HH]  ----------------------------<  402[H]  3.9   |  20  |  2.0[H]    Ca    8.4      07 Nov 2024 15:47  Mg     2.0     11-07    TPro  5.9[L]  /  Alb  3.7  /  TBili  0.5  /  DBili  0.2  /  AST  25  /  ALT  17  /  AlkPhos  91  11-07                          10.5   9.09  )-----------( x        ( 08 Nov 2024 06:20 )             31.7     PT/INR - ( 07 Nov 2024 15:47 )   PT: 16.50 sec;   INR: 1.39 ratio           Culture Results:   50,000 - 99,000 CFU/mL Enterococcus faecalis (11-05-24)  Culture Results:   No growth at 48 Hours (11-05-24)    RADIOLOGY  < from: Xray Chest 1 View- PORTABLE-Routine (Xray Chest 1 View- PORTABLE-Routine in AM.) (11.08.24 @ 06:49) >  IMPRESSION:    Redemonstration bibasilar focal atelectasis      < end of copied text >  < from: US Kidney and Bladder (11.07.24 @ 11:49) >  IMPRESSION:  No hydronephrosis.      < end of copied text >    MEDICATIONS  (STANDING):  apixaban 2.5 milliGRAM(s) Oral two times a day  atorvastatin 20 milliGRAM(s) Oral at bedtime  chlorhexidine 2% Cloths 1 Application(s) Topical daily  finasteride 5 milliGRAM(s) Oral daily  insulin glargine Injectable (LANTUS) 10 Unit(s) SubCutaneous once  insulin glargine Injectable (LANTUS) 12 Unit(s) SubCutaneous at bedtime  insulin lispro (ADMELOG) corrective regimen sliding scale   SubCutaneous three times a day before meals  insulin lispro (ADMELOG) corrective regimen sliding scale   SubCutaneous at bedtime  insulin lispro Injectable (ADMELOG) 9 Unit(s) SubCutaneous three times a day before meals  lactated ringers. 1000 milliLiter(s) (75 mL/Hr) IV Continuous <Continuous>  NIFEdipine XL 30 milliGRAM(s) Oral at bedtime  pantoprazole    Tablet 40 milliGRAM(s) Oral before breakfast  polyethylene glycol 3350 17 Gram(s) Oral daily  remdesivir  IVPB 100 milliGRAM(s) IV Intermittent every 24 hours  remdesivir  IVPB   IV Intermittent   senna 2 Tablet(s) Oral at bedtime  tamsulosin 0.4 milliGRAM(s) Oral at bedtime    MEDICATIONS  (PRN):  dextrose Oral Gel 15 Gram(s) Oral once PRN Blood Glucose LESS THAN 70 milliGRAM(s)/deciliter

## 2024-11-09 LAB
ALBUMIN SERPL ELPH-MCNC: 3.6 G/DL — SIGNIFICANT CHANGE UP (ref 3.5–5.2)
ALP SERPL-CCNC: 88 U/L — SIGNIFICANT CHANGE UP (ref 30–115)
ALT FLD-CCNC: 30 U/L — SIGNIFICANT CHANGE UP (ref 0–41)
AST SERPL-CCNC: 34 U/L — SIGNIFICANT CHANGE UP (ref 0–41)
BILIRUB DIRECT SERPL-MCNC: 0.3 MG/DL — SIGNIFICANT CHANGE UP (ref 0–0.3)
BILIRUB INDIRECT FLD-MCNC: 0.2 MG/DL — SIGNIFICANT CHANGE UP (ref 0.2–1.2)
BILIRUB SERPL-MCNC: 0.5 MG/DL — SIGNIFICANT CHANGE UP (ref 0.2–1.2)
CREAT SERPL-MCNC: 1.9 MG/DL — HIGH (ref 0.7–1.5)
EGFR: 34 ML/MIN/1.73M2 — LOW
GLUCOSE BLDC GLUCOMTR-MCNC: 154 MG/DL — HIGH (ref 70–99)
GLUCOSE BLDC GLUCOMTR-MCNC: 234 MG/DL — HIGH (ref 70–99)
GLUCOSE BLDC GLUCOMTR-MCNC: 268 MG/DL — HIGH (ref 70–99)
GLUCOSE BLDC GLUCOMTR-MCNC: 338 MG/DL — HIGH (ref 70–99)
INR BLD: 1.35 RATIO — HIGH (ref 0.65–1.3)
PROT SERPL-MCNC: 5.8 G/DL — LOW (ref 6–8)
PROTHROM AB SERPL-ACNC: 16 SEC — HIGH (ref 9.95–12.87)

## 2024-11-09 RX ADMIN — APIXABAN 2.5 MILLIGRAM(S): 5 TABLET, FILM COATED ORAL at 05:24

## 2024-11-09 RX ADMIN — Medication 2 TABLET(S): at 21:21

## 2024-11-09 RX ADMIN — APIXABAN 2.5 MILLIGRAM(S): 5 TABLET, FILM COATED ORAL at 17:03

## 2024-11-09 RX ADMIN — POLYETHYLENE GLYCOL 3350 17 GRAM(S): 17 POWDER, FOR SOLUTION ORAL at 11:06

## 2024-11-09 RX ADMIN — Medication 0.4 MILLIGRAM(S): at 21:20

## 2024-11-09 RX ADMIN — PANTOPRAZOLE SODIUM 40 MILLIGRAM(S): 40 TABLET, DELAYED RELEASE ORAL at 05:25

## 2024-11-09 RX ADMIN — Medication 1: at 07:51

## 2024-11-09 RX ADMIN — Medication 14 UNIT(S): at 17:00

## 2024-11-09 RX ADMIN — CHLORHEXIDINE GLUCONATE 1 APPLICATION(S): 40 SOLUTION TOPICAL at 11:06

## 2024-11-09 RX ADMIN — REMDESIVIR 200 MILLIGRAM(S): 100 INJECTION, POWDER, LYOPHILIZED, FOR SOLUTION INTRAVENOUS at 15:56

## 2024-11-09 RX ADMIN — Medication 20 MILLIGRAM(S): at 21:21

## 2024-11-09 RX ADMIN — Medication 18 UNIT(S): at 21:20

## 2024-11-09 RX ADMIN — Medication 4: at 11:51

## 2024-11-09 RX ADMIN — Medication 30 MILLIGRAM(S): at 21:21

## 2024-11-09 RX ADMIN — DEXAMETHASONE 1.5 MG 6 MILLIGRAM(S): 1.5 TABLET ORAL at 05:24

## 2024-11-09 RX ADMIN — FINASTERIDE 5 MILLIGRAM(S): 5 TABLET, FILM COATED ORAL at 11:06

## 2024-11-09 RX ADMIN — Medication 14 UNIT(S): at 11:52

## 2024-11-09 RX ADMIN — Medication 3: at 17:00

## 2024-11-09 RX ADMIN — Medication 14 UNIT(S): at 07:52

## 2024-11-09 NOTE — PROGRESS NOTE ADULT - TIME BILLING
On this date of service, level of risk to patient is considered: Moderate.   I have personally seen and examined this patient.    I have reviewed all pertinent clinical information and reviewed all relevant imaging and diagnostic studies personally.
I have personally seen and examined this patient.    I have reviewed all pertinent clinical information and reviewed all relevant imaging and diagnostic studies personally.   I discussed recommendations with the primary team.
I have personally seen and examined this patient.    I have reviewed all pertinent clinical information and reviewed all relevant imaging and diagnostic studies personally.   I discussed recommendations with the primary team.

## 2024-11-09 NOTE — PROGRESS NOTE ADULT - SUBJECTIVE AND OBJECTIVE BOX
CHAPINCITO YBARRA  89y, Male    LOS  4d    INTERVAL EVENTS/HPI  - No acute events overnight  - T(F): , Max: 98.2 (24 @ 04:18)  - WBC Count: 9.09 (24 @ 06:20)  WBC Count: 8.51 (24 @ 15:47)  - Creatinine: 1.8 (24 @ 06:20)  Creatinine: 2.0 (24 @ 15:47)  -Slightly confused today. Spoke with son in law at bedside.     REVIEW OF SYSTEMS:  CONSTITUTIONAL: No fever or chills  HEENT: No sore throat  RESPIRATORY: No cough, no shortness of breath  CARDIOVASCULAR: No chest pain or palpitations  GASTROINTESTINAL: No abdominal or epigastric pain  GENITOURINARY: No dysuria  NEUROLOGICAL: No headache/dizziness  MSK: No joint pain, erythema, or swelling; no back pain  SKIN: No itching, rashes  All other ROS negative except noted above    Prior hospital charts reviewed [Yes]  Other consultant notes reviewed [Yes]    ANTIMICROBIALS:   remdesivir  IVPB 100 every 24 hours  remdesivir  IVPB        OTHER MEDS: MEDICATIONS  (STANDING):  apixaban 2.5 two times a day  atorvastatin 20 at bedtime  dexAMETHasone     Tablet 6 daily  dextrose 50% Injectable 25 once  dextrose 50% Injectable 25 once  dextrose 50% Injectable 12.5 once  dextrose Oral Gel 15 once PRN  finasteride 5 daily  glucagon  Injectable 1 once  insulin glargine Injectable (LANTUS) 18 at bedtime  insulin lispro (ADMELOG) corrective regimen sliding scale  at bedtime  insulin lispro (ADMELOG) corrective regimen sliding scale  three times a day before meals  insulin lispro Injectable (ADMELOG) 14 three times a day before meals  NIFEdipine XL 30 at bedtime  pantoprazole    Tablet 40 before breakfast  polyethylene glycol 3350 17 daily  senna 2 at bedtime  tamsulosin 0.4 at bedtime      Vital Signs Last 24 Hrs  T(F): 98.2 (24 @ 04:18), Max: 102.7 (24 @ 08:43)    Vital Signs Last 24 Hrs  HR: 76 (24 @ 04:18) (70 - 82)  BP: 136/70 (24 @ 04:18) (125/56 - 151/72)  RR: 16 (24 @ 04:18)  SpO2: 96% (24 @ 04:18) (93% - 96%)  Wt(kg): --    EXAM:  GENERAL: NAD  NECK: Supple, nontender to palpation  RESPIRATORY: Clear to auscultation bilaterally  CARDIOVASCULAR: S1 S2  GASTROINTESTINAL: Soft, nontender  EXTREMITIES: No clubbing, cyanosis, or petal edema  NERVOUS SYSTEM: Alert and oriented to person, does not remember the day, place.   MUSCULOSKELETAL: No joint erythema, swelling or pain  SKIN: No rashes or lesions, no superficial thrombophlebitis  PSYCH: Normal affect    Labs:                        10.5   9.09  )-----------( 105      ( 2024 06:20 )             31.7         138  |  99  |  74[HH]  ----------------------------<  356[H]  4.1   |  21  |  1.8[H]    Ca    8.6      2024 06:20  Mg     2.0         TPro  6.0  /  Alb  3.7  /  TBili  0.7  /  DBili  x   /  AST  23  /  ALT  18  /  AlkPhos  86        WBC Trend:  WBC Count: 9.09 (24 @ 06:20)  WBC Count: 8.51 (24 @ 15:47)  WBC Count: 6.66 (24 @ 05:57)  WBC Count: 9.37 (24 @ 19:07)      Creatine Trend:  Creatinine: 1.8 ()  Creatinine: 2.0 ()  Creatinine: 1.7 ()  Creatinine: 0.9 ()      Liver Biochemical Testing Trend:  Alanine Aminotransferase (ALT/SGPT): 18 ()  Alanine Aminotransferase (ALT/SGPT): 17 ()  Alanine Aminotransferase (ALT/SGPT): 15 ()  Alanine Aminotransferase (ALT/SGPT): 12 ()  Alanine Aminotransferase (ALT/SGPT): 11 ()  Aspartate Aminotransferase (AST/SGOT): 23 (24 @ 06:20)  Aspartate Aminotransferase (AST/SGOT): 25 (24 @ 15:47)  Aspartate Aminotransferase (AST/SGOT): 25 (24 @ 05:57)  Aspartate Aminotransferase (AST/SGOT): 21 (24 @ 06:12)  Aspartate Aminotransferase (AST/SGOT): 21 (24 @ 05:23)  Bilirubin Total: 0.7 ()  Bilirubin Total: 0.5 ()  Bilirubin Direct: 0.2 ()  Bilirubin Total: 0.6 ()  Bilirubin Direct: 0.3 ()      Trend LDH      Urinalysis Basic - ( 2024 08:43 )    Color: Dark Yellow / Appearance: Cloudy / S.019 / pH: x  Gluc: x / Ketone: Negative mg/dL  / Bili: Negative / Urobili: 0.2 mg/dL   Blood: x / Protein: 30 mg/dL / Nitrite: Negative   Leuk Esterase: Negative / RBC: 25 /HPF / WBC 2 /HPF   Sq Epi: x / Non Sq Epi: x / Bacteria: Moderate /HPF        MICROBIOLOGY:    Male    Culture - Urine (collected 2024 18:20)  Source: Catheterized Catheterized  Final Report:    50,000 - 99,000 CFU/mL Enterococcus faecalis  Organism: Enterococcus faecalis  Organism: Enterococcus faecalis    Sensitivities:      Method Type: DAWOOD      -  Ampicillin: S <=2 Predicts results to ampicillin/sulbactam, amoxacillin-clavulanate and  piperacillin-tazobactam.      -  Ciprofloxacin: S <=1      -  Levofloxacin: S 1      -  Nitrofurantoin: S <=32 Should not be used to treat pyelonephritis.      -  Tetracycline: R >8      -  Vancomycin: S 2    Culture - Blood (collected 2024 15:54)  Source: .Blood BLOOD  Preliminary Report:    No growth at 72 Hours                          COVID-19 PCR: Detected (24 @ 11:40)      Procalcitonin: 1.18 ()  Procalcitonin: 0.45 ()    C-Reactive Protein: 50.1 ()  C-Reactive Protein: 92.6 ()    Ferritin: 904 ()  Ferritin: 562 ()    D-Dimer Assay, Quantitative: 170 ()  D-Dimer Assay, Quantitative: 283 ()                RADIOLOGY & ADDITIONAL TESTS:  I have personally reviewed the relevant images.   CXR      CT        WEIGHT  Weight (kg): 85.7 (24 @ 12:54)      All available historical records have been reviewed

## 2024-11-09 NOTE — PROGRESS NOTE ADULT - ASSESSMENT
89-year-old male, past medical history of HTN, HLD, DM, CAD s/p CABG ~10 years ago, paroxysmal Afib on Eliquis, BPH, presents to the ED for worsening cough x  2 days.  Patient seen at PMD yesterday and diagnosed with COVID and prescribed Paxlovid. Patient was too weak to make it to the bathroom and urinated before getting to the bathroom. Cough +ve for 2 days. SOB on exertion for last 2 days.      acute respiratory failure with hypoxemia - resolved / COVID / hyperglycemia     - clinically improved     - complete 5 day course of Remdisivir today. D/C decadron since he is now on room air, causing hyperglycemia and delirium.   - glycemic control   - continue Eliquis and other home meds   - Delirium precautions.    - Asymptomatic bacteuria- Will hold off abx. Cultures noted.     If any questions , please call or send a message on [employer] teams  Please update in real time with any pertinent new laboratory /microbiological/radiographically findings or a change in the clinical characteristics    Andrea Oliver M.D  Internal Medicine Attending/   [employer] at [facility]

## 2024-11-10 LAB
ALBUMIN SERPL ELPH-MCNC: 3.5 G/DL — SIGNIFICANT CHANGE UP (ref 3.5–5.2)
ALBUMIN SERPL ELPH-MCNC: 3.6 G/DL — SIGNIFICANT CHANGE UP (ref 3.5–5.2)
ALP SERPL-CCNC: 90 U/L — SIGNIFICANT CHANGE UP (ref 30–115)
ALP SERPL-CCNC: 90 U/L — SIGNIFICANT CHANGE UP (ref 30–115)
ALT FLD-CCNC: 34 U/L — SIGNIFICANT CHANGE UP (ref 0–41)
ALT FLD-CCNC: 34 U/L — SIGNIFICANT CHANGE UP (ref 0–41)
ANION GAP SERPL CALC-SCNC: 13 MMOL/L — SIGNIFICANT CHANGE UP (ref 7–14)
AST SERPL-CCNC: 36 U/L — SIGNIFICANT CHANGE UP (ref 0–41)
AST SERPL-CCNC: 36 U/L — SIGNIFICANT CHANGE UP (ref 0–41)
BASOPHILS # BLD AUTO: 0.04 K/UL — SIGNIFICANT CHANGE UP (ref 0–0.2)
BASOPHILS NFR BLD AUTO: 0.4 % — SIGNIFICANT CHANGE UP (ref 0–1)
BILIRUB DIRECT SERPL-MCNC: 0.2 MG/DL — SIGNIFICANT CHANGE UP (ref 0–0.3)
BILIRUB INDIRECT FLD-MCNC: 0.5 MG/DL — SIGNIFICANT CHANGE UP (ref 0.2–1.2)
BILIRUB SERPL-MCNC: 0.7 MG/DL — SIGNIFICANT CHANGE UP (ref 0.2–1.2)
BILIRUB SERPL-MCNC: 0.7 MG/DL — SIGNIFICANT CHANGE UP (ref 0.2–1.2)
BUN SERPL-MCNC: 62 MG/DL — CRITICAL HIGH (ref 10–20)
CALCIUM SERPL-MCNC: 8.9 MG/DL — SIGNIFICANT CHANGE UP (ref 8.4–10.5)
CHLORIDE SERPL-SCNC: 108 MMOL/L — SIGNIFICANT CHANGE UP (ref 98–110)
CO2 SERPL-SCNC: 22 MMOL/L — SIGNIFICANT CHANGE UP (ref 17–32)
CREAT SERPL-MCNC: 1.4 MG/DL — SIGNIFICANT CHANGE UP (ref 0.7–1.5)
CREAT SERPL-MCNC: 1.4 MG/DL — SIGNIFICANT CHANGE UP (ref 0.7–1.5)
CULTURE RESULTS: SIGNIFICANT CHANGE UP
EGFR: 49 ML/MIN/1.73M2 — LOW
EGFR: 49 ML/MIN/1.73M2 — LOW
EOSINOPHIL # BLD AUTO: 0 K/UL — SIGNIFICANT CHANGE UP (ref 0–0.7)
EOSINOPHIL NFR BLD AUTO: 0 % — SIGNIFICANT CHANGE UP (ref 0–8)
GLUCOSE BLDC GLUCOMTR-MCNC: 104 MG/DL — HIGH (ref 70–99)
GLUCOSE BLDC GLUCOMTR-MCNC: 129 MG/DL — HIGH (ref 70–99)
GLUCOSE BLDC GLUCOMTR-MCNC: 150 MG/DL — HIGH (ref 70–99)
GLUCOSE BLDC GLUCOMTR-MCNC: 83 MG/DL — SIGNIFICANT CHANGE UP (ref 70–99)
GLUCOSE SERPL-MCNC: 149 MG/DL — HIGH (ref 70–99)
HCT VFR BLD CALC: 37.2 % — LOW (ref 42–52)
HGB BLD-MCNC: 12.2 G/DL — LOW (ref 14–18)
IMM GRANULOCYTES NFR BLD AUTO: 1.5 % — HIGH (ref 0.1–0.3)
INR BLD: 1.34 RATIO — HIGH (ref 0.65–1.3)
LYMPHOCYTES # BLD AUTO: 0.81 K/UL — LOW (ref 1.2–3.4)
LYMPHOCYTES # BLD AUTO: 8.9 % — LOW (ref 20.5–51.1)
MCHC RBC-ENTMCNC: 29.7 PG — SIGNIFICANT CHANGE UP (ref 27–31)
MCHC RBC-ENTMCNC: 32.8 G/DL — SIGNIFICANT CHANGE UP (ref 32–37)
MCV RBC AUTO: 90.5 FL — SIGNIFICANT CHANGE UP (ref 80–94)
MONOCYTES # BLD AUTO: 0.86 K/UL — HIGH (ref 0.1–0.6)
MONOCYTES NFR BLD AUTO: 9.4 % — HIGH (ref 1.7–9.3)
NEUTROPHILS # BLD AUTO: 7.3 K/UL — HIGH (ref 1.4–6.5)
NEUTROPHILS NFR BLD AUTO: 79.8 % — HIGH (ref 42.2–75.2)
NRBC # BLD: 0 /100 WBCS — SIGNIFICANT CHANGE UP (ref 0–0)
PLATELET # BLD AUTO: 105 K/UL — LOW (ref 130–400)
PMV BLD: 13.6 FL — HIGH (ref 7.4–10.4)
POTASSIUM SERPL-MCNC: 3.9 MMOL/L — SIGNIFICANT CHANGE UP (ref 3.5–5)
POTASSIUM SERPL-SCNC: 3.9 MMOL/L — SIGNIFICANT CHANGE UP (ref 3.5–5)
PROT SERPL-MCNC: 6 G/DL — SIGNIFICANT CHANGE UP (ref 6–8)
PROT SERPL-MCNC: 6.1 G/DL — SIGNIFICANT CHANGE UP (ref 6–8)
PROTHROM AB SERPL-ACNC: 15.9 SEC — HIGH (ref 9.95–12.87)
RBC # BLD: 4.11 M/UL — LOW (ref 4.7–6.1)
RBC # FLD: 13.9 % — SIGNIFICANT CHANGE UP (ref 11.5–14.5)
SODIUM SERPL-SCNC: 143 MMOL/L — SIGNIFICANT CHANGE UP (ref 135–146)
SPECIMEN SOURCE: SIGNIFICANT CHANGE UP
WBC # BLD: 9.15 K/UL — SIGNIFICANT CHANGE UP (ref 4.8–10.8)
WBC # FLD AUTO: 9.15 K/UL — SIGNIFICANT CHANGE UP (ref 4.8–10.8)

## 2024-11-10 PROCEDURE — 99232 SBSQ HOSP IP/OBS MODERATE 35: CPT

## 2024-11-10 RX ADMIN — POLYETHYLENE GLYCOL 3350 17 GRAM(S): 17 POWDER, FOR SOLUTION ORAL at 11:25

## 2024-11-10 RX ADMIN — PANTOPRAZOLE SODIUM 40 MILLIGRAM(S): 40 TABLET, DELAYED RELEASE ORAL at 05:27

## 2024-11-10 RX ADMIN — FINASTERIDE 5 MILLIGRAM(S): 5 TABLET, FILM COATED ORAL at 11:24

## 2024-11-10 RX ADMIN — CHLORHEXIDINE GLUCONATE 1 APPLICATION(S): 40 SOLUTION TOPICAL at 11:28

## 2024-11-10 RX ADMIN — Medication 14 UNIT(S): at 17:03

## 2024-11-10 RX ADMIN — APIXABAN 2.5 MILLIGRAM(S): 5 TABLET, FILM COATED ORAL at 05:27

## 2024-11-10 RX ADMIN — Medication 0.4 MILLIGRAM(S): at 21:49

## 2024-11-10 RX ADMIN — Medication 2 TABLET(S): at 21:49

## 2024-11-10 RX ADMIN — Medication 30 MILLIGRAM(S): at 21:49

## 2024-11-10 RX ADMIN — Medication 18 UNIT(S): at 21:48

## 2024-11-10 RX ADMIN — APIXABAN 2.5 MILLIGRAM(S): 5 TABLET, FILM COATED ORAL at 17:02

## 2024-11-10 RX ADMIN — Medication 14 UNIT(S): at 08:33

## 2024-11-10 RX ADMIN — Medication 20 MILLIGRAM(S): at 21:49

## 2024-11-10 NOTE — PROGRESS NOTE ADULT - SUBJECTIVE AND OBJECTIVE BOX
CHAPINCITO YBARRA 89y Male  MRN#: [id number]     SUBJECTIVE  Patient is a 89y old Male who presents with a chief complaint of sob/cough/weakness (10 Nov 2024 09:37)    Interval/Overnight Events:    Today is hospital day 5d, and this morning he is lying in bed without distress.   No acute overnight events.     OBJECTIVE  PAST MEDICAL & SURGICAL HISTORY  3-vessel CAD    HTN (hypertension)    Diabetes mellitus    Hyperlipemia    BPH with obstruction/lower urinary tract symptoms    E-coli UTI    S/P CABG x 3    History of cataract surgery  left eye, Sep 2019      ALLERGIES:  No Known Allergies    MEDICATIONS:  STANDING MEDICATIONS  apixaban 2.5 milliGRAM(s) Oral two times a day  atorvastatin 20 milliGRAM(s) Oral at bedtime  chlorhexidine 2% Cloths 1 Application(s) Topical daily  dextrose 5%. 1000 milliLiter(s) IV Continuous <Continuous>  dextrose 5%. 1000 milliLiter(s) IV Continuous <Continuous>  dextrose 50% Injectable 25 Gram(s) IV Push once  dextrose 50% Injectable 25 Gram(s) IV Push once  dextrose 50% Injectable 12.5 Gram(s) IV Push once  finasteride 5 milliGRAM(s) Oral daily  glucagon  Injectable 1 milliGRAM(s) IntraMuscular once  insulin glargine Injectable (LANTUS) 18 Unit(s) SubCutaneous at bedtime  insulin lispro (ADMELOG) corrective regimen sliding scale   SubCutaneous at bedtime  insulin lispro (ADMELOG) corrective regimen sliding scale   SubCutaneous three times a day before meals  insulin lispro Injectable (ADMELOG) 14 Unit(s) SubCutaneous three times a day before meals  NIFEdipine XL 30 milliGRAM(s) Oral at bedtime  pantoprazole    Tablet 40 milliGRAM(s) Oral before breakfast  polyethylene glycol 3350 17 Gram(s) Oral daily  senna 2 Tablet(s) Oral at bedtime  tamsulosin 0.4 milliGRAM(s) Oral at bedtime    PRN MEDICATIONS  dextrose Oral Gel 15 Gram(s) Oral once PRN    HOME MEDICATIONS  Home Medications:  atorvastatin 20 mg oral tablet: 1 tab(s) orally once a day (05 Nov 2024 09:05)  Colace 100 mg oral capsule: 1 cap(s) orally 3 times a day (05 Nov 2024 09:05)  dutasteride 0.5 mg oral capsule: 1 cap(s) orally once a day (05 Nov 2024 09:05)  Eliquis 2.5 mg oral tablet: 1 tab(s) orally 2 times a day (05 Nov 2024 09:05)  folic acid 1 mg oral tablet: 1 tab(s) orally once a day (05 Nov 2024 09:05)  gabapentin 300 mg oral tablet: 1 tab(s) orally 3 times a day (05 Nov 2024 09:05)  Januvia 100 mg oral tablet: 1 tab(s) orally once a day (05 Nov 2024 09:05)  Lasix 40 mg oral tablet: 1 tab(s) orally once a day once a week (05 Nov 2024 09:04)  metFORMIN 500 mg oral tablet: 1 tab(s) orally 2 times a day (05 Nov 2024 09:05)  metoprolol succinate 25 mg oral tablet, extended release: 1 tab(s) orally once a day (05 Nov 2024 09:05)  tamsulosin 0.4 mg oral capsule: 1 cap(s) orally once a day (05 Nov 2024 09:05)      LABS:                        12.2   9.15  )-----------( 105      ( 10 Nov 2024 07:11 )             37.2     11-10    143  |  108  |  62[HH]  ----------------------------<  149[H]  3.9   |  22  |  1.4    Ca    8.9      10 Nov 2024 07:11    TPro  6.0  /  Alb  3.5  /  TBili  0.7  /  DBili  0.2  /  AST  36  /  ALT  34  /  AlkPhos  90  11-10    LIVER FUNCTIONS - ( 10 Nov 2024 07:11 )  Alb: 3.5 g/dL / Pro: 6.0 g/dL / ALK PHOS: 90 U/L / ALT: 34 U/L / AST: 36 U/L / GGT: x           PT/INR - ( 10 Nov 2024 07:11 )   PT: 15.90 sec;   INR: 1.34 ratio           Urinalysis Basic - ( 10 Nov 2024 07:11 )    Color: x / Appearance: x / SG: x / pH: x  Gluc: 149 mg/dL / Ketone: x  / Bili: x / Urobili: x   Blood: x / Protein: x / Nitrite: x   Leuk Esterase: x / RBC: x / WBC x   Sq Epi: x / Non Sq Epi: x / Bacteria: x                CAPILLARY BLOOD GLUCOSE      POCT Blood Glucose.: 83 mg/dL (10 Nov 2024 11:44)      PHYSICAL EXAM:  T(C): 36.7 (11-10-24 @ 04:56), Max: 36.7 (11-10-24 @ 04:56)  HR: 61 (11-10-24 @ 04:56) (61 - 75)  BP: 164/66 (11-10-24 @ 04:56) (133/63 - 164/66)  RR: 16 (11-10-24 @ 04:56) (16 - 16)  SpO2: 97% (11-10-24 @ 04:56) (94% - 97%)    GENERAL: NAD  NECK: Supple, nontender to palpation  RESPIRATORY: Clear to auscultation bilaterally  CARDIOVASCULAR: S1 S2  GASTROINTESTINAL: Soft, nontender  EXTREMITIES: No clubbing, cyanosis, or petal edema  NERVOUS SYSTEM: Alert and oriented to person, does not remember the day, place.   MUSCULOSKELETAL: No joint erythema, swelling or pain  SKIN: No rashes or lesions, no superficial thrombophlebitis  PSYCH: Normal affect    ADMISSION SUMMARY  Patient is a 89y old Male who presents with a chief complaint of sob/cough/weakness (10 Nov 2024 09:37)

## 2024-11-10 NOTE — DISCHARGE NOTE PROVIDER - CARE PROVIDERS DIRECT ADDRESSES
,DirectAddress_Unknown ,DirectAddress_Unknown,[email or web address]

## 2024-11-10 NOTE — DISCHARGE NOTE PROVIDER - PROVIDER TOKENS
PROVIDER:[TOKEN:[[id number]],FOLLOWUP:[Routine]] PROVIDER:[TOKEN:[[id number]],FOLLOWUP:[Routine]],PROVIDER:[TOKEN:[[id number]],FOLLOWUP:[1 month]]

## 2024-11-10 NOTE — DISCHARGE NOTE PROVIDER - NSDCCPCAREPLAN_GEN_ALL_CORE_FT
PRINCIPAL DISCHARGE DIAGNOSIS  Diagnosis: Acute hypoxic respiratory failure  Assessment and Plan of Treatment: You were seen and treated by the medical team for acute respiratory failure with hypoxemia secondary to COVID. You completed 5 day course of remedessivir and decadron, now stable on Room air.   Outpatient follow up with PCP recommended  If acute symptoms, recommend coming back to the ED.      SECONDARY DISCHARGE DIAGNOSES  Diagnosis: 2019 novel coronavirus disease (COVID-19)  Assessment and Plan of Treatment: treatement as above     PRINCIPAL DISCHARGE DIAGNOSIS  Diagnosis: Acute hypoxic respiratory failure  Assessment and Plan of Treatment: You were seen and treated by the medical team for acute respiratory failure with hypoxemia secondary to COVID. You completed 5 day course of remedessivir and decadron, now stable on Room air.   Outpatient follow up with PCP recommended  If acute symptoms, recommend coming back to the ED.      SECONDARY DISCHARGE DIAGNOSES  Diagnosis: 2019 novel coronavirus disease (COVID-19)  Assessment and Plan of Treatment: treatement as above    Diagnosis: Hyperthyroidism  Assessment and Plan of Treatment: Likely due to your COVID (acute illness can affect your thyroid function).  See your doctor in 4-6 weeks for repeat thyroid testing.

## 2024-11-10 NOTE — DISCHARGE NOTE PROVIDER - NSDCMRMEDTOKEN_GEN_ALL_CORE_FT
atorvastatin 20 mg oral tablet: 1 tab(s) orally once a day  Colace 100 mg oral capsule: 1 cap(s) orally 3 times a day  dutasteride 0.5 mg oral capsule: 1 cap(s) orally once a day  Eliquis 2.5 mg oral tablet: 1 tab(s) orally 2 times a day  folic acid 1 mg oral tablet: 1 tab(s) orally once a day  gabapentin 300 mg oral tablet: 1 tab(s) orally 3 times a day  Januvia 100 mg oral tablet: 1 tab(s) orally once a day  Lasix 40 mg oral tablet: 1 tab(s) orally once a day once a week  metFORMIN 500 mg oral tablet: 1 tab(s) orally 2 times a day  metoprolol succinate 25 mg oral tablet, extended release: 1 tab(s) orally once a day  tamsulosin 0.4 mg oral capsule: 1 cap(s) orally once a day

## 2024-11-10 NOTE — DISCHARGE NOTE PROVIDER - HOSPITAL COURSE
89-year-old male, past medical history of HTN, HLD, DM, CAD s/p CABG ~10 years ago, paroxysmal Afib on Eliquis, BPH, presents to the ED for worsening cough x  2 days.  Patient seen at PMD yesterday and diagnosed with COVID and prescribed Paxlovid. Patient was too weak to make it to the bathroom and urinated before getting to the bathroom. Cough +ve for 2 days. SOB on exertion for last 2 days.      #acute respiratory failure with hypoxemia - resolved / COVID / hyperglycemia   - clinically improved     - complete 5 day course of RDV today. D/C decadron since he is now on room air, causing hyperglycemia and delirium.   - glycemic control    #DWAIN   - Cr trending up, baseline sCr .9, pending AM labs     #Paroxysmal AFib   #CAD s/p CABG ~10 years ago  #HLD  - c/w Eliquis 2.5mg bid, and statin  - Lasix 40mg once a week, held in setting of DWAIN???  - metoprolol succinate 25mg qd help and patient started on procardia??     #Pyuria  - Asymptomatic bacteuria- Will hold off abx. Cultures noted.     #Hx of DM  #Hyperglycemia and Delirium 2/2 to Decadron   - started on insulin in patient   - will resume home meds upon discharge   - Monitor FS  - Delirium Precaution     #HLD - c/w home statin   #BPH - c/w home meds - flomax, dutasteride > finasteride inpatient        89-year-old male, past medical history of HTN, HLD, DM, CAD s/p CABG ~10 years ago, paroxysmal Afib on Eliquis, BPH, presents to the ED for worsening cough x  2 days.  Patient seen at PMD yesterday and diagnosed with COVID and prescribed Paxlovid. Patient was too weak to make it to the bathroom and urinated before getting to the bathroom. Cough +ve for 2 days. SOB on exertion for last 2 days.      #acute respiratory failure with hypoxemia - resolved / COVID / hyperglycemia   - clinically improved     - complete 5 day course of RDV today. D/C decadron since he is now on room air, causing hyperglycemia and delirium.   - glycemic control    #DWAIN   - Cr trending up, baseline sCr .9, Cr 1.4 on day of d/c     #Paroxysmal AFib   #CAD s/p CABG ~10 years ago  #HLD  - c/w Eliquis 2.5mg bid, and statin  - Lasix 40mg once a week, held in setting of DWAIN???  - metoprolol succinate 25mg qd help and patient started on procardia??     #Pyuria  - Asymptomatic bacteuria- Will hold off abx. Cultures noted.     #Hx of DM  #Hyperglycemia and Delirium 2/2 to Decadron   - started on insulin in patient   - will resume home meds upon discharge   - Monitor FS  - Delirium Precaution     #HLD - c/w home statin   #BPH - c/w home meds - flomax, dutasteride > finasteride inpatient     #Suspected Subclinical hyperthyroidism TSH 0.12/ T4 1.1  - Per Endocrinology: TSH suppression could be secondary to high-dose steroids versus euthyroid sick syndrome. Recommend repeat thyroid function test including TSH free T4 and total T3 in 4 weeks outpatient   89-year-old male, past medical history of HTN, HLD, DM, CAD s/p CABG ~10 years ago, Afib on Eliquis, BPH, presents to the ED for worsening cough onset 2 days ago. Admitted to SDU for further management of AHRF likely 2/2 COVID PNA.     DOWNGRADE FROM UNIT 11/10    #AHRF likely 2/2 COVID PNA - resolved   - HD stable, on RA now  - wbc wnl, fever 102 in ED no further fevers  - CXR: Left basilar atelectasis/ procal 0.45/ Bcx NGTD  - s/p solumedrol 125mg iv x 1  - completed 5 days of dexa 6mg qd - daughter did not wish to continue given overall improvement and adverse side effects of steroids   - completed Remdesivir x 5 days  - ID EVAL appreciated     # DWAIN - improving  prerenal  initially component of retention(now resolved)   # H/O BPH  - baseline Ce 0.8, Cr trended down to 1.3   - RBUS - no hydro   - cw home bph meds     #Hematuria- resolved   2/2 ?traumatic straight cath in ED per nursing report   # Urine clx w/ 50-99 K E.Faecalis ? Asymptomatic bacteriuria   - cbc stable  - continue Eliquis 2.5mg bid      #Toxic metabolic encephalopathy - resolved - now AAx3   possibly 2/2 covid   - CT head neg  (on eliquis, fall 2 days ago from bed no head trauma/syncope/LOC/ENT bleeds per family )  - blood clx negative   - ABG noted no retention/hypercapnia  - AAOx3 as of 11/13     #Suspected Subclinical hyperthyroidism TSH 0.12/ T4 1.1 ; endocrinology eval appreciated - recommend repeat thyroid function test including TSH free T4 and total T3 in 4 weeks outpatient    # H/O CAD s/p CABG ~10 years ago  # H/O Paroxysmal Atrial Fibrillation - rate controlled   - on Eliquis 2.5mg bid  - Lasix  - metoprolol succinate 25mg qd  - Lipitor 20mg qd    #H/O DM- hold home po medications / FS/ Insulin inpatient/ Hba1c 7.5     #H/O Neuropathy - on gabapentin 300mg bid at home    DC to [facility] today

## 2024-11-10 NOTE — PROGRESS NOTE ADULT - ASSESSMENT
89-year-old male, past medical history of HTN, HLD, DM, CAD s/p CABG ~10 years ago, paroxysmal Afib on Eliquis, BPH, presents to the ED for worsening cough x  2 days.  Patient seen at PMD yesterday and diagnosed with COVID and prescribed Paxlovid. Patient was too weak to make it to the bathroom and urinated before getting to the bathroom. Cough +ve for 2 days. SOB on exertion for last 2 days.      #acute respiratory failure with hypoxemia 2/2 COVID, resolved  #metabolic encephalopathy vs delirium  - COVID positive  - CXR unremarkable  - s/p RDV x5 days  - on room air, s/p decadron  - PT, rehab  - delirium precautions    #hyperglycemia  - A1c 7.5  - off decadron  - lispro 14u TID, lantus 18u qhs, sliding scale    #Asymptomatic bacteruria  - asymptomatic  - monitor off abx    #Chronic Afib  - rate controlled  - c/w Eliquis 2.5mg BID     #HTN  - c/w nifedipine 30mg qhs    #HLD  - c/w atorvastatin 20mg qhs    #BPH  -c/w finasteride 5mg daily    Misc:  DVT ppx: Eliquis  GI ppx: protonix  Diet: DASH/TLC, CC  Activity: IAT  Code: Full Code  Dispo: Acute    Pending/Handoff: dc planning, improvement in delirium, likely rehab placement

## 2024-11-10 NOTE — DISCHARGE NOTE PROVIDER - CARE PROVIDER_API CALL
Caesar Fernandez  Internal Medicine  [address] [place] [address]  Phone: [phone]  Fax: [phone]  Follow Up Time: Routine   Caesar Fernandez  Internal Medicine  [address] [place] [address]  Phone: [phone]  Fax: [phone]  Follow Up Time: Routine    Javier Barbosa  Endocrinology/Metab/Diabetes  [address] [place] [address]  Phone: [phone]  Fax: [phone]  Follow Up Time: 1 month

## 2024-11-11 LAB
ALBUMIN SERPL ELPH-MCNC: 3.2 G/DL — LOW (ref 3.5–5.2)
ALP SERPL-CCNC: 95 U/L — SIGNIFICANT CHANGE UP (ref 30–115)
ALT FLD-CCNC: 31 U/L — SIGNIFICANT CHANGE UP (ref 0–41)
ANION GAP SERPL CALC-SCNC: 14 MMOL/L — SIGNIFICANT CHANGE UP (ref 7–14)
AST SERPL-CCNC: 25 U/L — SIGNIFICANT CHANGE UP (ref 0–41)
BILIRUB DIRECT SERPL-MCNC: 0.3 MG/DL — SIGNIFICANT CHANGE UP (ref 0–0.3)
BILIRUB INDIRECT FLD-MCNC: 0.4 MG/DL — SIGNIFICANT CHANGE UP (ref 0.2–1.2)
BILIRUB SERPL-MCNC: 0.7 MG/DL — SIGNIFICANT CHANGE UP (ref 0.2–1.2)
BUN SERPL-MCNC: 48 MG/DL — HIGH (ref 10–20)
CALCIUM SERPL-MCNC: 8.4 MG/DL — SIGNIFICANT CHANGE UP (ref 8.4–10.5)
CHLORIDE SERPL-SCNC: 109 MMOL/L — SIGNIFICANT CHANGE UP (ref 98–110)
CO2 SERPL-SCNC: 19 MMOL/L — SIGNIFICANT CHANGE UP (ref 17–32)
CREAT SERPL-MCNC: 1.3 MG/DL — SIGNIFICANT CHANGE UP (ref 0.7–1.5)
CREAT SERPL-MCNC: 1.4 MG/DL — SIGNIFICANT CHANGE UP (ref 0.7–1.5)
EGFR: 49 ML/MIN/1.73M2 — LOW
EGFR: 53 ML/MIN/1.73M2 — LOW
GLUCOSE BLDC GLUCOMTR-MCNC: 112 MG/DL — HIGH (ref 70–99)
GLUCOSE BLDC GLUCOMTR-MCNC: 152 MG/DL — HIGH (ref 70–99)
GLUCOSE BLDC GLUCOMTR-MCNC: 192 MG/DL — HIGH (ref 70–99)
GLUCOSE BLDC GLUCOMTR-MCNC: 215 MG/DL — HIGH (ref 70–99)
GLUCOSE SERPL-MCNC: 164 MG/DL — HIGH (ref 70–99)
INR BLD: 1.23 RATIO — SIGNIFICANT CHANGE UP (ref 0.65–1.3)
POTASSIUM SERPL-MCNC: 4.4 MMOL/L — SIGNIFICANT CHANGE UP (ref 3.5–5)
POTASSIUM SERPL-SCNC: 4.4 MMOL/L — SIGNIFICANT CHANGE UP (ref 3.5–5)
PROT SERPL-MCNC: 5.7 G/DL — LOW (ref 6–8)
PROTHROM AB SERPL-ACNC: 14.6 SEC — HIGH (ref 9.95–12.87)
SODIUM SERPL-SCNC: 142 MMOL/L — SIGNIFICANT CHANGE UP (ref 135–146)

## 2024-11-11 PROCEDURE — 99233 SBSQ HOSP IP/OBS HIGH 50: CPT

## 2024-11-11 RX ORDER — CLONIDINE HYDROCHLORIDE 0.2 MG/1
0.1 TABLET ORAL ONCE
Refills: 0 | Status: COMPLETED | OUTPATIENT
Start: 2024-11-11 | End: 2024-11-11

## 2024-11-11 RX ADMIN — Medication 2: at 17:31

## 2024-11-11 RX ADMIN — APIXABAN 2.5 MILLIGRAM(S): 5 TABLET, FILM COATED ORAL at 05:24

## 2024-11-11 RX ADMIN — APIXABAN 2.5 MILLIGRAM(S): 5 TABLET, FILM COATED ORAL at 18:34

## 2024-11-11 RX ADMIN — Medication 30 MILLIGRAM(S): at 21:34

## 2024-11-11 RX ADMIN — CLONIDINE HYDROCHLORIDE 0.1 MILLIGRAM(S): 0.2 TABLET ORAL at 05:25

## 2024-11-11 RX ADMIN — Medication 14 UNIT(S): at 11:42

## 2024-11-11 RX ADMIN — Medication 18 UNIT(S): at 21:35

## 2024-11-11 RX ADMIN — POLYETHYLENE GLYCOL 3350 17 GRAM(S): 17 POWDER, FOR SOLUTION ORAL at 11:40

## 2024-11-11 RX ADMIN — Medication 14 UNIT(S): at 17:31

## 2024-11-11 RX ADMIN — Medication 1: at 11:41

## 2024-11-11 RX ADMIN — Medication 0.4 MILLIGRAM(S): at 21:34

## 2024-11-11 RX ADMIN — FINASTERIDE 5 MILLIGRAM(S): 5 TABLET, FILM COATED ORAL at 11:40

## 2024-11-11 RX ADMIN — CHLORHEXIDINE GLUCONATE 1 APPLICATION(S): 40 SOLUTION TOPICAL at 11:40

## 2024-11-11 RX ADMIN — Medication 20 MILLIGRAM(S): at 21:35

## 2024-11-11 RX ADMIN — Medication 50 MILLILITER(S): at 11:40

## 2024-11-11 NOTE — PROGRESS NOTE ADULT - SUBJECTIVE AND OBJECTIVE BOX
SUBJECTIVE:    Patient is a 89y old Male who presents with a chief complaint of sob/cough/weakness (10 Nov 2024 12:25)    Currently admitted to medicine with the primary diagnosis of Acute hypoxic respiratory failure       Today is hospital day 6d. This morning he is resting comfortably in bed and reports no new issues or overnight events.     ROS:   CONSTITUTIONAL: No weakness, fevers or chills   EYES/ENT: No visual changes; No vertigo or throat pain   NECK: No pain or stiffness   RESPIRATORY: No cough, wheezing, hemoptysis; No shortness of breath   CARDIOVASCULAR: No chest pain or palpitations   GASTROINTESTINAL: No abdominal or epigastric pain. No nausea, vomiting, or hematemesis; No diarrhea or constipation. No melena or hematochezia.  GENITOURINARY: No dysuria, frequency or hematuria  NEUROLOGICAL: No numbness or weakness  SKIN: No itching, rashes      PAST MEDICAL & SURGICAL HISTORY  3-vessel CAD    HTN (hypertension)    Diabetes mellitus    Hyperlipemia    BPH with obstruction/lower urinary tract symptoms    E-coli UTI    S/P CABG x 3    History of cataract surgery  left eye, Sep 2019      SOCIAL HISTORY:    ALLERGIES:  No Known Allergies    MEDICATIONS:  STANDING MEDICATIONS  apixaban 2.5 milliGRAM(s) Oral two times a day  atorvastatin 20 milliGRAM(s) Oral at bedtime  chlorhexidine 2% Cloths 1 Application(s) Topical daily  dextrose 5%. 1000 milliLiter(s) IV Continuous <Continuous>  dextrose 5%. 1000 milliLiter(s) IV Continuous <Continuous>  dextrose 50% Injectable 25 Gram(s) IV Push once  dextrose 50% Injectable 12.5 Gram(s) IV Push once  dextrose 50% Injectable 25 Gram(s) IV Push once  finasteride 5 milliGRAM(s) Oral daily  glucagon  Injectable 1 milliGRAM(s) IntraMuscular once  insulin glargine Injectable (LANTUS) 18 Unit(s) SubCutaneous at bedtime  insulin lispro (ADMELOG) corrective regimen sliding scale   SubCutaneous at bedtime  insulin lispro (ADMELOG) corrective regimen sliding scale   SubCutaneous three times a day before meals  insulin lispro Injectable (ADMELOG) 14 Unit(s) SubCutaneous three times a day before meals  lactated ringers. 1000 milliLiter(s) IV Continuous <Continuous>  NIFEdipine XL 30 milliGRAM(s) Oral at bedtime  pantoprazole    Tablet 40 milliGRAM(s) Oral before breakfast  polyethylene glycol 3350 17 Gram(s) Oral daily  senna 2 Tablet(s) Oral at bedtime  tamsulosin 0.4 milliGRAM(s) Oral at bedtime    PRN MEDICATIONS  dextrose Oral Gel 15 Gram(s) Oral once PRN    VITALS:   T(F): 97.4  HR: 62  BP: 169/82  RR: 18  SpO2: 96%    LABS:  Negative for smoking/alcohol/drug use.                         12.2   9.15  )-----------( 105      ( 10 Nov 2024 07:11 )             37.2     11-11    x   |  x   |  x   ----------------------------<  x   x    |  x   |  1.3    Ca    8.9      10 Nov 2024 07:11    TPro  5.7[L]  /  Alb  3.2[L]  /  TBili  0.7  /  DBili  0.3  /  AST  25  /  ALT  31  /  AlkPhos  95  11-11    PT/INR - ( 11 Nov 2024 06:31 )   PT: 14.60 sec;   INR: 1.23 ratio           Urinalysis Basic - ( 10 Nov 2024 07:11 )    Color: x / Appearance: x / SG: x / pH: x  Gluc: 149 mg/dL / Ketone: x  / Bili: x / Urobili: x   Blood: x / Protein: x / Nitrite: x   Leuk Esterase: x / RBC: x / WBC x   Sq Epi: x / Non Sq Epi: x / Bacteria: x                RADIOLOGY:    PHYSICAL EXAM:  GEN: No acute distress  HEENT: normocephalic, atraumatic, aniceteric  LUNGS: Clear to auscultation bilaterally, no rales/wheezing/ rhonchi  HEART: S1/S2 present. RRR, no murmurs  ABD: Soft, non-tender, non-distended. Bowel sounds present  EXT: no edema   NEURO: AAOX3, normal affect      ASSESSMENT AND PLAN:    89-year-old male, past medical history of HTN, HLD, DM, CAD s/p CABG ~10 years ago, Afib on Eliquis, BPH, presents to the ED for worsening cough onset 2 days ago. Admitted to SDU for further management of AHRF likely 2/2 COVID PNA.     DOWNGRADE FROM UNIT 11/10    #AHRF likely 2/2 COVID PNA - reslved   - HD stable, on RA now  - wbc wnl, fever 102 in ED no further fevers  - ABG: pH 7.33, Co2 44, hco3 23, pao2 62 on 5lit NC, Lactate 3  - CXR: Left basilar atelectasis/ procal 0.45/ Bcx NGTD  - s/p solumedrol 125mg iv x 1  - completed 5 days of dexa 6mg qd - daughter did not wish to continue given overall improvement and adverse side effects of steroids   - completed Remdesivir x 5 days  - ID EVAL appreciated     # DWAIN - improving  prerenal  initially component of retention(now resolved)   # H/O BPH  - baseline Ce 0.8   - RBUS - no hydro   - IVF   - avoid nephrotoxic medications , LASIX was HELD   - repeat bmp   - cw home bph meds     #Hematuria- resolved   2/2 ?traumatic straight cath in ED per nursing report   # Urine clx w/ 50-99 K E.Faecalis ? Asymptomatic bacteriuria   - cbc stable  - bladder scan q6hr, okay to continue Eliquis 2.5mg bid  - monitor off abx - pending input from ID     #Toxic metabolic encephalopathy - resolved   possibly 2/2 covid   - CT head neg  (on eliquis, fall 2 days ago from bed no head trauma/syncope/LOC/ENT bleeds per family )  - blood clx negative   - ABG noted no retention/hypercapnia  - AAx3 as of 11/11   - supportive care     #Suspected Subclinical hyperthyroidism TSH 0.12/ T4 1.1 ; endocrinology eval     # H/O CAD s/p CABG ~10 years ago  # H/O Paroxysmal Atrial Fibrillation - rate controlled   - on Eliquis 2.5mg bid  - Lasix HELD given dwain   - metoprolol succinate 25mg qd, nifedipine 30 mg qhs   - Lipitor 20mg qd    #H/O DM- hold home po medications / FS/ Insulin inpatient/ Hba1c 7.5     #H/O Neuropathy - on gabapentin 300mg bid at home , was held given lethargy     dvt/ gi ppx/diet  dispo: dc ready pending dispo to rehab ([facility])  handoff: id fu / endo eval   family discussion: spoke to daughter at bedside 11/11 - all questions answered and concerns addressed

## 2024-11-11 NOTE — CONSULT NOTE ADULT - SUBJECTIVE AND OBJECTIVE BOX
89-year-old male, past medical history of HTN, HLD, DM, CAD s/p CABG ~10 years ago, Afib on Eliquis, BPH, presents to the ED for worsening cough onset 2 days ago. Admitted to SDU for further management of AHRF likely 2/2 COVID PNA.            Today is hospital day 6d. This morning patient was seen and examined at bedside, resting comfortably in bed.    No acute or major events overnight.    PAST MEDICAL & SURGICAL HISTORY  3-vessel CAD    HTN (hypertension)    Diabetes mellitus    Hyperlipemia    BPH with obstruction/lower urinary tract symptoms    E-coli UTI    S/P CABG x 3    History of cataract surgery  left eye, Sep 2019      SOCIAL HISTORY:  Social History:      ALLERGIES:  No Known Allergies    MEDICATIONS:  STANDING MEDICATIONS  apixaban 2.5 milliGRAM(s) Oral two times a day  atorvastatin 20 milliGRAM(s) Oral at bedtime  chlorhexidine 2% Cloths 1 Application(s) Topical daily  dextrose 5%. 1000 milliLiter(s) IV Continuous <Continuous>  dextrose 5%. 1000 milliLiter(s) IV Continuous <Continuous>  dextrose 50% Injectable 25 Gram(s) IV Push once  dextrose 50% Injectable 12.5 Gram(s) IV Push once  dextrose 50% Injectable 25 Gram(s) IV Push once  finasteride 5 milliGRAM(s) Oral daily  glucagon  Injectable 1 milliGRAM(s) IntraMuscular once  insulin glargine Injectable (LANTUS) 18 Unit(s) SubCutaneous at bedtime  insulin lispro (ADMELOG) corrective regimen sliding scale   SubCutaneous three times a day before meals  insulin lispro (ADMELOG) corrective regimen sliding scale   SubCutaneous at bedtime  insulin lispro Injectable (ADMELOG) 14 Unit(s) SubCutaneous three times a day before meals  lactated ringers. 1000 milliLiter(s) IV Continuous <Continuous>  NIFEdipine XL 30 milliGRAM(s) Oral at bedtime  pantoprazole    Tablet 40 milliGRAM(s) Oral before breakfast  polyethylene glycol 3350 17 Gram(s) Oral daily  senna 2 Tablet(s) Oral at bedtime  tamsulosin 0.4 milliGRAM(s) Oral at bedtime    PRN MEDICATIONS  dextrose Oral Gel 15 Gram(s) Oral once PRN    VITALS:   T(F): 97.7  HR: 61  BP: 134/72  RR: 18  SpO2: 98%    PHYSICAL EXAM:  GENERAL: NAD, well-groomed, well-developed  HEAD:  Atraumatic, Normocephalic  EYES: EOMI  NECK: Supple  NERVOUS SYSTEM:  Alert & Oriented X3, non focal   CHEST/LUNG: Clear to auscultation bilaterally; No rales, rhonchi, wheezing, or rubs  HEART: Regular rate and rhythm; No murmurs, rubs, or gallops  ABDOMEN: Soft, Nontender, Nondistended; Bowel sounds present  EXTREMITIES:  2+ Peripheral Pulses, No clubbing, cyanosis, or edema  LYMPH: No lymphadenopathy noted  SKIN: No rashes or lesions  LABS:                        12.2   9.15  )-----------( 105      ( 10 Nov 2024 07:11 )             37.2     11-11    x   |  x   |  x   ----------------------------<  x   x    |  x   |  1.3    Ca    8.9      10 Nov 2024 07:11    TPro  5.7[L]  /  Alb  3.2[L]  /  TBili  0.7  /  DBili  0.3  /  AST  25  /  ALT  31  /  AlkPhos  95  11-11    PT/INR - ( 11 Nov 2024 06:31 )   PT: 14.60 sec;   INR: 1.23 ratio           Urinalysis Basic - ( 10 Nov 2024 07:11 )    Color: x / Appearance: x / SG: x / pH: x  Gluc: 149 mg/dL / Ketone: x  / Bili: x / Urobili: x   Blood: x / Protein: x / Nitrite: x   Leuk Esterase: x / RBC: x / WBC x   Sq Epi: x / Non Sq Epi: x / Bacteria: x                RADIOLOGY:

## 2024-11-11 NOTE — CONSULT NOTE ADULT - ASSESSMENT
89-year-old male, past medical history of HTN, HLD, DM, CAD s/p CABG ~10 years ago, Afib on Eliquis, BPH, presents to the ED for worsening cough onset 2 days ago. Admitted to SDU for further management of AHRF likely 2/2 COVID PNA.     Abnormal thyroid labs   - TSH 0.12 Ft4 1.1 89-year-old male, past medical history of HTN, HLD, DM, CAD s/p CABG ~10 years ago, Afib on Eliquis, BPH, presents to the ED for worsening cough onset 2 days ago. Admitted to SDU for further management of AHRF likely 2/2 COVID PNA.     Abnormal thyroid labs   - TSH 0.12 Ft4 1.1  -Clinically euthyroid denies any significant hyperthyroid complaints, Does not appear to be on amiodarone recently  -TSH suppression could be secondary to high-dose steroids versus euthyroid sick syndrome (abnormal labs in the setting of acute illness)  -Would recommend repeat thyroid function test including TSH free T4 and total T3 in 4 weeks outpatient

## 2024-11-12 LAB
ANION GAP SERPL CALC-SCNC: 9 MMOL/L — SIGNIFICANT CHANGE UP (ref 7–14)
BUN SERPL-MCNC: 37 MG/DL — HIGH (ref 10–20)
CALCIUM SERPL-MCNC: 8.4 MG/DL — SIGNIFICANT CHANGE UP (ref 8.4–10.5)
CHLORIDE SERPL-SCNC: 106 MMOL/L — SIGNIFICANT CHANGE UP (ref 98–110)
CO2 SERPL-SCNC: 25 MMOL/L — SIGNIFICANT CHANGE UP (ref 17–32)
CREAT SERPL-MCNC: 1.3 MG/DL — SIGNIFICANT CHANGE UP (ref 0.7–1.5)
EGFR: 53 ML/MIN/1.73M2 — LOW
GLUCOSE BLDC GLUCOMTR-MCNC: 113 MG/DL — HIGH (ref 70–99)
GLUCOSE BLDC GLUCOMTR-MCNC: 130 MG/DL — HIGH (ref 70–99)
GLUCOSE BLDC GLUCOMTR-MCNC: 131 MG/DL — HIGH (ref 70–99)
GLUCOSE BLDC GLUCOMTR-MCNC: 63 MG/DL — LOW (ref 70–99)
GLUCOSE BLDC GLUCOMTR-MCNC: 79 MG/DL — SIGNIFICANT CHANGE UP (ref 70–99)
GLUCOSE BLDC GLUCOMTR-MCNC: 93 MG/DL — SIGNIFICANT CHANGE UP (ref 70–99)
GLUCOSE SERPL-MCNC: 135 MG/DL — HIGH (ref 70–99)
POTASSIUM SERPL-MCNC: 3.6 MMOL/L — SIGNIFICANT CHANGE UP (ref 3.5–5)
POTASSIUM SERPL-SCNC: 3.6 MMOL/L — SIGNIFICANT CHANGE UP (ref 3.5–5)
SODIUM SERPL-SCNC: 140 MMOL/L — SIGNIFICANT CHANGE UP (ref 135–146)

## 2024-11-12 PROCEDURE — 99232 SBSQ HOSP IP/OBS MODERATE 35: CPT

## 2024-11-12 RX ADMIN — FINASTERIDE 5 MILLIGRAM(S): 5 TABLET, FILM COATED ORAL at 11:40

## 2024-11-12 RX ADMIN — APIXABAN 2.5 MILLIGRAM(S): 5 TABLET, FILM COATED ORAL at 05:28

## 2024-11-12 RX ADMIN — CHLORHEXIDINE GLUCONATE 1 APPLICATION(S): 40 SOLUTION TOPICAL at 11:43

## 2024-11-12 RX ADMIN — Medication 2 TABLET(S): at 22:16

## 2024-11-12 RX ADMIN — Medication 0.4 MILLIGRAM(S): at 22:16

## 2024-11-12 RX ADMIN — APIXABAN 2.5 MILLIGRAM(S): 5 TABLET, FILM COATED ORAL at 18:32

## 2024-11-12 RX ADMIN — Medication 20 MILLIGRAM(S): at 22:17

## 2024-11-12 RX ADMIN — Medication 30 MILLIGRAM(S): at 22:16

## 2024-11-12 RX ADMIN — Medication 14 UNIT(S): at 16:54

## 2024-11-12 RX ADMIN — Medication 14 UNIT(S): at 11:43

## 2024-11-12 RX ADMIN — Medication 50 MILLILITER(S): at 16:55

## 2024-11-12 NOTE — PROGRESS NOTE ADULT - SUBJECTIVE AND OBJECTIVE BOX
SUBJECTIVE:    Patient is a 89y old Male who presents with a chief complaint of sob/cough/weakness (11 Nov 2024 17:01)    Currently admitted to medicine with the primary diagnosis of Acute hypoxic respiratory failure       Today is hospital day 7d. This morning he is resting comfortably in bed and reports no new issues or overnight events.     ROS:   CONSTITUTIONAL: No weakness, fevers or chills   EYES/ENT: No visual changes; No vertigo or throat pain   NECK: No pain or stiffness   RESPIRATORY: No cough, wheezing, hemoptysis; No shortness of breath   CARDIOVASCULAR: No chest pain or palpitations   GASTROINTESTINAL: No abdominal or epigastric pain. No nausea, vomiting, or hematemesis; No diarrhea or constipation. No melena or hematochezia.  GENITOURINARY: No dysuria, frequency or hematuria  NEUROLOGICAL: No numbness or weakness  SKIN: No itching, rashes      PAST MEDICAL & SURGICAL HISTORY  3-vessel CAD    HTN (hypertension)    Diabetes mellitus    Hyperlipemia    BPH with obstruction/lower urinary tract symptoms    E-coli UTI    S/P CABG x 3    History of cataract surgery  left eye, Sep 2019      SOCIAL HISTORY:    ALLERGIES:  No Known Allergies    MEDICATIONS:  STANDING MEDICATIONS  apixaban 2.5 milliGRAM(s) Oral two times a day  atorvastatin 20 milliGRAM(s) Oral at bedtime  chlorhexidine 2% Cloths 1 Application(s) Topical daily  dextrose 5%. 1000 milliLiter(s) IV Continuous <Continuous>  dextrose 5%. 1000 milliLiter(s) IV Continuous <Continuous>  dextrose 50% Injectable 25 Gram(s) IV Push once  dextrose 50% Injectable 12.5 Gram(s) IV Push once  dextrose 50% Injectable 25 Gram(s) IV Push once  finasteride 5 milliGRAM(s) Oral daily  glucagon  Injectable 1 milliGRAM(s) IntraMuscular once  insulin glargine Injectable (LANTUS) 18 Unit(s) SubCutaneous at bedtime  insulin lispro (ADMELOG) corrective regimen sliding scale   SubCutaneous three times a day before meals  insulin lispro (ADMELOG) corrective regimen sliding scale   SubCutaneous at bedtime  insulin lispro Injectable (ADMELOG) 14 Unit(s) SubCutaneous three times a day before meals  lactated ringers. 1000 milliLiter(s) IV Continuous <Continuous>  lactated ringers. 1000 milliLiter(s) IV Continuous <Continuous>  NIFEdipine XL 30 milliGRAM(s) Oral at bedtime  pantoprazole    Tablet 40 milliGRAM(s) Oral before breakfast  polyethylene glycol 3350 17 Gram(s) Oral daily  senna 2 Tablet(s) Oral at bedtime  tamsulosin 0.4 milliGRAM(s) Oral at bedtime    PRN MEDICATIONS  dextrose Oral Gel 15 Gram(s) Oral once PRN    VITALS:   T(F): 98.3  HR: 69  BP: 132/69  RR: 18  SpO2: 96%    LABS:  Negative for smoking/alcohol/drug use.     11-11    142  |  109  |  48[H]  ----------------------------<  164[H]  4.4   |  19  |  1.4    Ca    8.4      11 Nov 2024 22:00    TPro  5.7[L]  /  Alb  3.2[L]  /  TBili  0.7  /  DBili  0.3  /  AST  25  /  ALT  31  /  AlkPhos  95  11-11    PT/INR - ( 11 Nov 2024 06:31 )   PT: 14.60 sec;   INR: 1.23 ratio           Urinalysis Basic - ( 11 Nov 2024 22:00 )    Color: x / Appearance: x / SG: x / pH: x  Gluc: 164 mg/dL / Ketone: x  / Bili: x / Urobili: x   Blood: x / Protein: x / Nitrite: x   Leuk Esterase: x / RBC: x / WBC x   Sq Epi: x / Non Sq Epi: x / Bacteria: x                RADIOLOGY:    PHYSICAL EXAM:  GEN: No acute distress  HEENT: normocephalic, atraumatic, aniceteric  LUNGS: Clear to auscultation bilaterally, no rales/wheezing/ rhonchi  HEART: S1/S2 present. RRR, no murmurs  ABD: Soft, non-tender, non-distended. Bowel sounds present  EXT: warm   NEURO: AAOX3, normal affect      ASSESSMENT AND PLAN:    89-year-old male, past medical history of HTN, HLD, DM, CAD s/p CABG ~10 years ago, Afib on Eliquis, BPH, presents to the ED for worsening cough onset 2 days ago. Admitted to SDU for further management of AHRF likely 2/2 COVID PNA.     DOWNGRADE FROM UNIT 11/10    #AHRF likely 2/2 COVID PNA - reslved   - HD stable, on RA now  - wbc wnl, fever 102 in ED no further fevers  - ABG: pH 7.33, Co2 44, hco3 23, pao2 62 on 5lit NC, Lactate 3  - CXR: Left basilar atelectasis/ procal 0.45/ Bcx NGTD  - s/p solumedrol 125mg iv x 1  - completed 5 days of dexa 6mg qd - daughter did not wish to continue given overall improvement and adverse side effects of steroids   - completed Remdesivir x 5 days  - ID EVAL appreciated     # DWAIN - improving  prerenal  initially component of retention(now resolved)   # H/O BPH  - baseline Ce 0.8   - RBUS - no hydro   - IVF   - repeat bmp   - bladder scan q6h, monitor pvr   - cw home bph meds   - avoid nephrotoxic medications , LASIX was HELD     #Hematuria- resolved   2/2 ?traumatic straight cath in ED per nursing report   # Urine clx w/ 50-99 K E.Faecalis ? Asymptomatic bacteriuria   - cbc stable  - bladder scan q6hr, okay to continue Eliquis 2.5mg bid  - monitor off abx - pending input from ID       #Toxic metabolic encephalopathy - resolved - now AAx3   possibly 2/2 covid   - CT head neg  (on eliquis, fall 2 days ago from bed no head trauma/syncope/LOC/ENT bleeds per family )  - blood clx negative   - ABG noted no retention/hypercapnia  - AAx3 as of 11/11   - supportive care     #Suspected Subclinical hyperthyroidism TSH 0.12/ T4 1.1 ; endocrinology eval appreciated - recommend repeat thyroid function test including TSH free T4 and total T3 in 4 weeks outpatient    # H/O CAD s/p CABG ~10 years ago  # H/O Paroxysmal Atrial Fibrillation - rate controlled   - on Eliquis 2.5mg bid  - Lasix HELD given dwain   - metoprolol succinate 25mg qd, nifedipine 30 mg qhs   - Lipitor 20mg qd    #H/O DM- hold home po medications / FS/ Insulin inpatient/ Hba1c 7.5     #H/O Neuropathy - on gabapentin 300mg bid at home , was held given lethargy     dvt/ gi ppx/diet  dispo: dc ready pending dispo to rehab ([facility])  handoff: pending dispo to rehab , repeat bmp - dwain improving -  ivf today while pending auth   family discussion: spoke to daughter at bedside 11/11 - all questions answered and concerns addressed

## 2024-11-12 NOTE — PROGRESS NOTE ADULT - REASON FOR ADMISSION
sob/cough/weakness

## 2024-11-12 NOTE — PROGRESS NOTE ADULT - PROVIDER SPECIALTY LIST ADULT
Infectious Disease
Internal Medicine
Critical Care
Hospitalist
Infectious Disease
Hospitalist
Hospitalist
Internal Medicine
Critical Care
Hospitalist
Internal Medicine

## 2024-11-13 ENCOUNTER — TRANSCRIPTION ENCOUNTER (OUTPATIENT)
Age: 89
End: 2024-11-13

## 2024-11-13 VITALS
HEART RATE: 54 BPM | SYSTOLIC BLOOD PRESSURE: 139 MMHG | TEMPERATURE: 98 F | DIASTOLIC BLOOD PRESSURE: 59 MMHG | OXYGEN SATURATION: 96 % | RESPIRATION RATE: 18 BRPM

## 2024-11-13 LAB
GLUCOSE BLDC GLUCOMTR-MCNC: 159 MG/DL — HIGH (ref 70–99)
GLUCOSE BLDC GLUCOMTR-MCNC: 159 MG/DL — HIGH (ref 70–99)
GLUCOSE BLDC GLUCOMTR-MCNC: 59 MG/DL — LOW (ref 70–99)

## 2024-11-13 PROCEDURE — 99239 HOSP IP/OBS DSCHRG MGMT >30: CPT

## 2024-11-13 RX ADMIN — PANTOPRAZOLE SODIUM 40 MILLIGRAM(S): 40 TABLET, DELAYED RELEASE ORAL at 05:20

## 2024-11-13 RX ADMIN — POLYETHYLENE GLYCOL 3350 17 GRAM(S): 17 POWDER, FOR SOLUTION ORAL at 12:03

## 2024-11-13 RX ADMIN — Medication 14 UNIT(S): at 08:13

## 2024-11-13 RX ADMIN — Medication 14 UNIT(S): at 12:02

## 2024-11-13 RX ADMIN — Medication 1: at 12:02

## 2024-11-13 RX ADMIN — CHLORHEXIDINE GLUCONATE 1 APPLICATION(S): 40 SOLUTION TOPICAL at 12:06

## 2024-11-13 RX ADMIN — FINASTERIDE 5 MILLIGRAM(S): 5 TABLET, FILM COATED ORAL at 12:03

## 2024-11-13 RX ADMIN — Medication 1: at 08:13

## 2024-11-13 RX ADMIN — APIXABAN 2.5 MILLIGRAM(S): 5 TABLET, FILM COATED ORAL at 05:20

## 2024-11-13 NOTE — DISCHARGE NOTE NURSING/CASE MANAGEMENT/SOCIAL WORK - PATIENT PORTAL LINK FT
You can access the FollowMyHealth Patient Portal offered by [facility] by registering at the following website: [email or web address]. By joining [facility]’s FollowMyHealth portal, you will also be able to view your health information using other applications (apps) compatible with our system.

## 2024-11-13 NOTE — DISCHARGE NOTE NURSING/CASE MANAGEMENT/SOCIAL WORK - NSDCPEFALRISK_GEN_ALL_CORE
For information on Fall & Injury Prevention, visit: [email or web address] OR  [email or web address] OR  https://www.cdc.gov/steadi/patient.html

## 2024-11-13 NOTE — CHART NOTE - NSCHARTNOTEFT_GEN_A_CORE
Swallow evaluation orders received, chart reviewed. Pt received on venti-mask, not appropriate for PO intake/ swallow evaluation at this time. Spoke w/ RN and messaged MD Mendoza via Teams. SLP to f/u tomorrow.
Called by RN, patient with confusion, different from baseline.  Patient immediately assessed at bedside, family present.  Reporting patient just woke up and its very hot in the room.  Reporting patient with some mild confusion, having difficulty answering questions.  Patient assessed, confusion noted.  No focal deficits.  VSS.  PE otherwise benign.  Patient with likely hospital induced delirium +/- underlying dementia given age and Hx.  Will monitor and reassess.  If no improvement or further changes will evaluate with CT.  d/w medicine attending.

## 2024-11-13 NOTE — DISCHARGE NOTE NURSING/CASE MANAGEMENT/SOCIAL WORK - FINANCIAL ASSISTANCE
[facility] provides services at a reduced cost to those who are determined to be eligible through [facility]’s financial assistance program. Information regarding [facility]’s financial assistance program can be found by going to [email or web address] or by calling [phone].

## 2024-11-20 DIAGNOSIS — E78.5 HYPERLIPIDEMIA, UNSPECIFIED: ICD-10-CM

## 2024-11-20 DIAGNOSIS — G93.41 METABOLIC ENCEPHALOPATHY: ICD-10-CM

## 2024-11-20 DIAGNOSIS — N40.0 BENIGN PROSTATIC HYPERPLASIA WITHOUT LOWER URINARY TRACT SYMPTOMS: ICD-10-CM

## 2024-11-20 DIAGNOSIS — G92.8 OTHER TOXIC ENCEPHALOPATHY: ICD-10-CM

## 2024-11-20 DIAGNOSIS — J96.01 ACUTE RESPIRATORY FAILURE WITH HYPOXIA: ICD-10-CM

## 2024-11-20 DIAGNOSIS — E11.65 TYPE 2 DIABETES MELLITUS WITH HYPERGLYCEMIA: ICD-10-CM

## 2024-11-20 DIAGNOSIS — I10 ESSENTIAL (PRIMARY) HYPERTENSION: ICD-10-CM

## 2024-11-20 DIAGNOSIS — D84.81 IMMUNODEFICIENCY DUE TO CONDITIONS CLASSIFIED ELSEWHERE: ICD-10-CM

## 2024-11-20 DIAGNOSIS — Z79.82 LONG TERM (CURRENT) USE OF ASPIRIN: ICD-10-CM

## 2024-11-20 DIAGNOSIS — Z95.1 PRESENCE OF AORTOCORONARY BYPASS GRAFT: ICD-10-CM

## 2024-11-20 DIAGNOSIS — J12.82 PNEUMONIA DUE TO CORONAVIRUS DISEASE 2019: ICD-10-CM

## 2024-11-20 DIAGNOSIS — N17.9 ACUTE KIDNEY FAILURE, UNSPECIFIED: ICD-10-CM

## 2024-11-20 DIAGNOSIS — U07.1 COVID-19: ICD-10-CM

## 2024-11-20 DIAGNOSIS — Z79.01 LONG TERM (CURRENT) USE OF ANTICOAGULANTS: ICD-10-CM

## 2024-11-20 DIAGNOSIS — Z79.84 LONG TERM (CURRENT) USE OF ORAL HYPOGLYCEMIC DRUGS: ICD-10-CM

## 2024-11-20 DIAGNOSIS — E11.40 TYPE 2 DIABETES MELLITUS WITH DIABETIC NEUROPATHY, UNSPECIFIED: ICD-10-CM

## 2024-11-20 DIAGNOSIS — I48.91 UNSPECIFIED ATRIAL FIBRILLATION: ICD-10-CM

## 2024-11-20 DIAGNOSIS — I25.10 ATHEROSCLEROTIC HEART DISEASE OF NATIVE CORONARY ARTERY WITHOUT ANGINA PECTORIS: ICD-10-CM

## 2025-01-09 ENCOUNTER — APPOINTMENT (OUTPATIENT)
Dept: CARDIOLOGY | Facility: CLINIC | Age: 89
End: 2025-01-09

## 2025-03-05 NOTE — DISCHARGE NOTE NURSING/CASE MANAGEMENT/SOCIAL WORK - NSDCVIVACCINE_GEN_ALL_CORE_FT
No Vaccines Administered. 03-05 Na 138 mmol/L Glu 170 mg/dL[H] K+ 4.7 mmol/L Cr 0.54 mg/dL BUN 26 mg/dL[H] Phos n/a

## 2025-04-29 ENCOUNTER — APPOINTMENT (OUTPATIENT)
Dept: CARDIOLOGY | Facility: CLINIC | Age: 89
End: 2025-04-29
Payer: MEDICARE

## 2025-04-29 ENCOUNTER — NON-APPOINTMENT (OUTPATIENT)
Age: 89
End: 2025-04-29

## 2025-04-29 VITALS
SYSTOLIC BLOOD PRESSURE: 120 MMHG | WEIGHT: 180 LBS | HEIGHT: 69 IN | HEART RATE: 45 BPM | BODY MASS INDEX: 26.66 KG/M2 | DIASTOLIC BLOOD PRESSURE: 60 MMHG

## 2025-04-29 DIAGNOSIS — I25.10 ATHEROSCLEROTIC HEART DISEASE OF NATIVE CORONARY ARTERY W/OUT ANGINA PECTORIS: ICD-10-CM

## 2025-04-29 DIAGNOSIS — E78.5 HYPERLIPIDEMIA, UNSPECIFIED: ICD-10-CM

## 2025-04-29 DIAGNOSIS — Z95.1 PRESENCE OF AORTOCORONARY BYPASS GRAFT: ICD-10-CM

## 2025-04-29 DIAGNOSIS — I48.91 UNSPECIFIED ATRIAL FIBRILLATION: ICD-10-CM

## 2025-04-29 DIAGNOSIS — I34.0 NONRHEUMATIC MITRAL (VALVE) INSUFFICIENCY: ICD-10-CM

## 2025-04-29 DIAGNOSIS — I11.9 HYPERTENSIVE HEART DISEASE W/OUT HEART FAILURE: ICD-10-CM

## 2025-04-29 PROCEDURE — 99214 OFFICE O/P EST MOD 30 MIN: CPT | Mod: 25

## 2025-04-29 PROCEDURE — 93000 ELECTROCARDIOGRAM COMPLETE: CPT

## 2025-06-04 ENCOUNTER — RX RENEWAL (OUTPATIENT)
Age: 89
End: 2025-06-04

## 2025-07-30 ENCOUNTER — APPOINTMENT (OUTPATIENT)
Dept: CARDIOLOGY | Facility: CLINIC | Age: 89
End: 2025-07-30
Payer: MEDICARE

## 2025-07-30 VITALS
HEIGHT: 69 IN | WEIGHT: 180 LBS | SYSTOLIC BLOOD PRESSURE: 108 MMHG | HEART RATE: 38 BPM | BODY MASS INDEX: 26.66 KG/M2 | DIASTOLIC BLOOD PRESSURE: 60 MMHG

## 2025-07-30 DIAGNOSIS — Z95.1 PRESENCE OF AORTOCORONARY BYPASS GRAFT: ICD-10-CM

## 2025-07-30 DIAGNOSIS — I11.9 HYPERTENSIVE HEART DISEASE W/OUT HEART FAILURE: ICD-10-CM

## 2025-07-30 DIAGNOSIS — I48.91 UNSPECIFIED ATRIAL FIBRILLATION: ICD-10-CM

## 2025-07-30 DIAGNOSIS — I25.10 ATHEROSCLEROTIC HEART DISEASE OF NATIVE CORONARY ARTERY W/OUT ANGINA PECTORIS: ICD-10-CM

## 2025-07-30 DIAGNOSIS — I34.0 NONRHEUMATIC MITRAL (VALVE) INSUFFICIENCY: ICD-10-CM

## 2025-07-30 DIAGNOSIS — E78.5 HYPERLIPIDEMIA, UNSPECIFIED: ICD-10-CM

## 2025-07-30 PROCEDURE — 99214 OFFICE O/P EST MOD 30 MIN: CPT | Mod: 25

## 2025-07-30 PROCEDURE — 93000 ELECTROCARDIOGRAM COMPLETE: CPT

## 2025-09-02 ENCOUNTER — RX RENEWAL (OUTPATIENT)
Age: 89
End: 2025-09-02